# Patient Record
Sex: FEMALE | Race: WHITE | NOT HISPANIC OR LATINO | Employment: OTHER | URBAN - METROPOLITAN AREA
[De-identification: names, ages, dates, MRNs, and addresses within clinical notes are randomized per-mention and may not be internally consistent; named-entity substitution may affect disease eponyms.]

---

## 2023-05-25 ENCOUNTER — APPOINTMENT (OUTPATIENT)
Dept: GENERAL RADIOLOGY | Facility: CLINIC | Age: 78
DRG: 246 | End: 2023-05-25
Attending: EMERGENCY MEDICINE
Payer: COMMERCIAL

## 2023-05-25 ENCOUNTER — HOSPITAL ENCOUNTER (INPATIENT)
Facility: CLINIC | Age: 78
LOS: 7 days | Discharge: NURSING, REHAB OR LONG-TERM CARE | DRG: 246 | End: 2023-06-01
Attending: EMERGENCY MEDICINE | Admitting: STUDENT IN AN ORGANIZED HEALTH CARE EDUCATION/TRAINING PROGRAM
Payer: COMMERCIAL

## 2023-05-25 DIAGNOSIS — Z95.5 STATUS POST INSERTION OF DRUG ELUTING CORONARY ARTERY STENT: ICD-10-CM

## 2023-05-25 DIAGNOSIS — I48.91 ATRIAL FIBRILLATION, UNSPECIFIED TYPE (H): ICD-10-CM

## 2023-05-25 DIAGNOSIS — I10 PRIMARY HYPERTENSION: ICD-10-CM

## 2023-05-25 DIAGNOSIS — R79.89 ELEVATED TROPONIN: ICD-10-CM

## 2023-05-25 DIAGNOSIS — I50.9 ACUTE CONGESTIVE HEART FAILURE, UNSPECIFIED HEART FAILURE TYPE (H): ICD-10-CM

## 2023-05-25 DIAGNOSIS — D64.9 ANEMIA, UNSPECIFIED TYPE: ICD-10-CM

## 2023-05-25 DIAGNOSIS — M25.561 ACUTE PAIN OF BOTH KNEES: ICD-10-CM

## 2023-05-25 DIAGNOSIS — I50.21 ACUTE SYSTOLIC HEART FAILURE (H): Primary | ICD-10-CM

## 2023-05-25 DIAGNOSIS — M25.562 ACUTE PAIN OF BOTH KNEES: ICD-10-CM

## 2023-05-25 DIAGNOSIS — I50.23 ACUTE ON CHRONIC SYSTOLIC HEART FAILURE (H): ICD-10-CM

## 2023-05-25 DIAGNOSIS — I48.0 PAROXYSMAL ATRIAL FIBRILLATION (H): ICD-10-CM

## 2023-05-25 DIAGNOSIS — R10.13 DYSPEPSIA: ICD-10-CM

## 2023-05-25 LAB
ALBUMIN SERPL BCG-MCNC: 4.1 G/DL (ref 3.5–5.2)
ALP SERPL-CCNC: 83 U/L (ref 35–104)
ALT SERPL W P-5'-P-CCNC: 26 U/L (ref 10–35)
ANION GAP SERPL CALCULATED.3IONS-SCNC: 13 MMOL/L (ref 7–15)
AST SERPL W P-5'-P-CCNC: 33 U/L (ref 10–35)
BASOPHILS # BLD AUTO: 0.1 10E3/UL (ref 0–0.2)
BASOPHILS NFR BLD AUTO: 0 %
BILIRUB DIRECT SERPL-MCNC: 0.35 MG/DL (ref 0–0.3)
BILIRUB SERPL-MCNC: 1 MG/DL
BUN SERPL-MCNC: 21.1 MG/DL (ref 8–23)
CALCIUM SERPL-MCNC: 9.3 MG/DL (ref 8.8–10.2)
CHLORIDE SERPL-SCNC: 95 MMOL/L (ref 98–107)
CREAT SERPL-MCNC: 0.79 MG/DL (ref 0.51–0.95)
DEPRECATED HCO3 PLAS-SCNC: 21 MMOL/L (ref 22–29)
EOSINOPHIL # BLD AUTO: 0.1 10E3/UL (ref 0–0.7)
EOSINOPHIL NFR BLD AUTO: 0 %
ERYTHROCYTE [DISTWIDTH] IN BLOOD BY AUTOMATED COUNT: 16.5 % (ref 10–15)
GFR SERPL CREATININE-BSD FRML MDRD: 76 ML/MIN/1.73M2
GLUCOSE SERPL-MCNC: 138 MG/DL (ref 70–99)
HCT VFR BLD AUTO: 37.6 % (ref 35–47)
HGB BLD-MCNC: 11.6 G/DL (ref 11.7–15.7)
HOLD SPECIMEN: NORMAL
HOLD SPECIMEN: NORMAL
IMM GRANULOCYTES # BLD: 0.1 10E3/UL
IMM GRANULOCYTES NFR BLD: 1 %
LYMPHOCYTES # BLD AUTO: 1.2 10E3/UL (ref 0.8–5.3)
LYMPHOCYTES NFR BLD AUTO: 8 %
MAGNESIUM SERPL-MCNC: 2 MG/DL (ref 1.7–2.3)
MCH RBC QN AUTO: 27 PG (ref 26.5–33)
MCHC RBC AUTO-ENTMCNC: 30.9 G/DL (ref 31.5–36.5)
MCV RBC AUTO: 87 FL (ref 78–100)
MONOCYTES # BLD AUTO: 0.9 10E3/UL (ref 0–1.3)
MONOCYTES NFR BLD AUTO: 6 %
NEUTROPHILS # BLD AUTO: 12.7 10E3/UL (ref 1.6–8.3)
NEUTROPHILS NFR BLD AUTO: 85 %
NRBC # BLD AUTO: 0 10E3/UL
NRBC BLD AUTO-RTO: 0 /100
NT-PROBNP SERPL-MCNC: 5524 PG/ML (ref 0–1800)
PLATELET # BLD AUTO: 352 10E3/UL (ref 150–450)
POTASSIUM SERPL-SCNC: 4.7 MMOL/L (ref 3.4–5.3)
PROT SERPL-MCNC: 6.9 G/DL (ref 6.4–8.3)
RBC # BLD AUTO: 4.3 10E6/UL (ref 3.8–5.2)
SODIUM SERPL-SCNC: 129 MMOL/L (ref 136–145)
T4 FREE SERPL-MCNC: 1.24 NG/DL (ref 0.9–1.7)
TROPONIN T SERPL HS-MCNC: 21 NG/L
TSH SERPL DL<=0.005 MIU/L-ACNC: 7.02 UIU/ML (ref 0.3–4.2)
WBC # BLD AUTO: 15 10E3/UL (ref 4–11)

## 2023-05-25 PROCEDURE — 85018 HEMOGLOBIN: CPT | Performed by: EMERGENCY MEDICINE

## 2023-05-25 PROCEDURE — 84484 ASSAY OF TROPONIN QUANT: CPT | Performed by: EMERGENCY MEDICINE

## 2023-05-25 PROCEDURE — 82248 BILIRUBIN DIRECT: CPT | Performed by: EMERGENCY MEDICINE

## 2023-05-25 PROCEDURE — 250N000013 HC RX MED GY IP 250 OP 250 PS 637: Performed by: PHYSICIAN ASSISTANT

## 2023-05-25 PROCEDURE — 83880 ASSAY OF NATRIURETIC PEPTIDE: CPT | Performed by: EMERGENCY MEDICINE

## 2023-05-25 PROCEDURE — 93005 ELECTROCARDIOGRAM TRACING: CPT

## 2023-05-25 PROCEDURE — 99223 1ST HOSP IP/OBS HIGH 75: CPT | Mod: AI | Performed by: PHYSICIAN ASSISTANT

## 2023-05-25 PROCEDURE — 83735 ASSAY OF MAGNESIUM: CPT | Performed by: PHYSICIAN ASSISTANT

## 2023-05-25 PROCEDURE — 84439 ASSAY OF FREE THYROXINE: CPT | Performed by: PHYSICIAN ASSISTANT

## 2023-05-25 PROCEDURE — 99285 EMERGENCY DEPT VISIT HI MDM: CPT | Mod: 25

## 2023-05-25 PROCEDURE — 71046 X-RAY EXAM CHEST 2 VIEWS: CPT

## 2023-05-25 PROCEDURE — 84443 ASSAY THYROID STIM HORMONE: CPT | Performed by: PHYSICIAN ASSISTANT

## 2023-05-25 PROCEDURE — 250N000011 HC RX IP 250 OP 636: Performed by: EMERGENCY MEDICINE

## 2023-05-25 PROCEDURE — 120N000001 HC R&B MED SURG/OB

## 2023-05-25 PROCEDURE — 250N000013 HC RX MED GY IP 250 OP 250 PS 637: Performed by: EMERGENCY MEDICINE

## 2023-05-25 PROCEDURE — 36415 COLL VENOUS BLD VENIPUNCTURE: CPT | Performed by: EMERGENCY MEDICINE

## 2023-05-25 RX ORDER — ACETAMINOPHEN 325 MG/1
650 TABLET ORAL EVERY 6 HOURS PRN
Status: DISCONTINUED | OUTPATIENT
Start: 2023-05-25 | End: 2023-05-30

## 2023-05-25 RX ORDER — FUROSEMIDE 10 MG/ML
40 INJECTION INTRAMUSCULAR; INTRAVENOUS EVERY 12 HOURS
Status: DISCONTINUED | OUTPATIENT
Start: 2023-05-26 | End: 2023-05-27

## 2023-05-25 RX ORDER — LOSARTAN POTASSIUM 25 MG/1
25 TABLET ORAL 2 TIMES DAILY
Status: DISCONTINUED | OUTPATIENT
Start: 2023-05-25 | End: 2023-05-30

## 2023-05-25 RX ORDER — AMOXICILLIN 250 MG
1 CAPSULE ORAL 2 TIMES DAILY PRN
Status: DISCONTINUED | OUTPATIENT
Start: 2023-05-25 | End: 2023-06-01 | Stop reason: HOSPADM

## 2023-05-25 RX ORDER — METOPROLOL TARTRATE 25 MG/1
25 TABLET, FILM COATED ORAL 2 TIMES DAILY
Status: DISCONTINUED | OUTPATIENT
Start: 2023-05-26 | End: 2023-05-28

## 2023-05-25 RX ORDER — ACETAMINOPHEN 650 MG/1
650 SUPPOSITORY RECTAL EVERY 6 HOURS PRN
Status: DISCONTINUED | OUTPATIENT
Start: 2023-05-25 | End: 2023-06-01 | Stop reason: HOSPADM

## 2023-05-25 RX ORDER — LEVOTHYROXINE SODIUM 112 UG/1
112 TABLET ORAL
COMMUNITY
Start: 2022-09-06

## 2023-05-25 RX ORDER — LEVOTHYROXINE SODIUM 112 UG/1
112 TABLET ORAL
Status: DISCONTINUED | OUTPATIENT
Start: 2023-05-26 | End: 2023-06-01 | Stop reason: HOSPADM

## 2023-05-25 RX ORDER — POLYETHYLENE GLYCOL 3350 17 G/17G
17 POWDER, FOR SOLUTION ORAL DAILY PRN
Status: DISCONTINUED | OUTPATIENT
Start: 2023-05-25 | End: 2023-06-01 | Stop reason: HOSPADM

## 2023-05-25 RX ORDER — METOPROLOL TARTRATE 1 MG/ML
5 INJECTION, SOLUTION INTRAVENOUS
Status: ACTIVE | OUTPATIENT
Start: 2023-05-25 | End: 2023-05-25

## 2023-05-25 RX ORDER — FUROSEMIDE 10 MG/ML
40 INJECTION INTRAMUSCULAR; INTRAVENOUS ONCE
Status: COMPLETED | OUTPATIENT
Start: 2023-05-25 | End: 2023-05-25

## 2023-05-25 RX ORDER — ENOXAPARIN SODIUM 150 MG/ML
1 INJECTION SUBCUTANEOUS EVERY 12 HOURS
Status: DISCONTINUED | OUTPATIENT
Start: 2023-05-25 | End: 2023-05-26

## 2023-05-25 RX ORDER — LIDOCAINE 40 MG/G
CREAM TOPICAL
Status: DISCONTINUED | OUTPATIENT
Start: 2023-05-25 | End: 2023-06-01 | Stop reason: HOSPADM

## 2023-05-25 RX ORDER — ENOXAPARIN SODIUM 150 MG/ML
0.75 INJECTION SUBCUTANEOUS ONCE
Status: DISCONTINUED | OUTPATIENT
Start: 2023-05-25 | End: 2023-05-25

## 2023-05-25 RX ORDER — AMOXICILLIN 250 MG
2 CAPSULE ORAL 2 TIMES DAILY PRN
Status: DISCONTINUED | OUTPATIENT
Start: 2023-05-25 | End: 2023-06-01 | Stop reason: HOSPADM

## 2023-05-25 RX ORDER — ONDANSETRON 2 MG/ML
4 INJECTION INTRAMUSCULAR; INTRAVENOUS EVERY 6 HOURS PRN
Status: DISCONTINUED | OUTPATIENT
Start: 2023-05-25 | End: 2023-06-01 | Stop reason: HOSPADM

## 2023-05-25 RX ORDER — METOPROLOL TARTRATE 25 MG/1
25 TABLET, FILM COATED ORAL ONCE
Status: COMPLETED | OUTPATIENT
Start: 2023-05-25 | End: 2023-05-25

## 2023-05-25 RX ORDER — ONDANSETRON 4 MG/1
4 TABLET, ORALLY DISINTEGRATING ORAL EVERY 6 HOURS PRN
Status: DISCONTINUED | OUTPATIENT
Start: 2023-05-25 | End: 2023-06-01 | Stop reason: HOSPADM

## 2023-05-25 RX ORDER — LOSARTAN POTASSIUM 25 MG/1
25 TABLET ORAL 2 TIMES DAILY
Status: ON HOLD | COMMUNITY
Start: 2022-09-06 | End: 2023-06-01

## 2023-05-25 RX ADMIN — FUROSEMIDE 40 MG: 10 INJECTION, SOLUTION INTRAVENOUS at 19:15

## 2023-05-25 RX ADMIN — LOSARTAN POTASSIUM 25 MG: 25 TABLET, FILM COATED ORAL at 23:51

## 2023-05-25 RX ADMIN — METOPROLOL TARTRATE 25 MG: 25 TABLET, FILM COATED ORAL at 19:16

## 2023-05-25 ASSESSMENT — ACTIVITIES OF DAILY LIVING (ADL)
WALKING_OR_CLIMBING_STAIRS_DIFFICULTY: YES
DIFFICULTY_EATING/SWALLOWING: NO
DRESS: 0-->INDEPENDENT
FALL_HISTORY_WITHIN_LAST_SIX_MONTHS: NO
VISION_MANAGEMENT: GLASSES
ADLS_ACUITY_SCORE: 35
HEARING_DIFFICULTY_OR_DEAF: NO
DRESS: 0-->INDEPENDENT
TOILETING_ISSUES: NO
TRANSFERRING: 0-->INDEPENDENT
DRESSING/BATHING: DRESSING DIFFICULTY, DEPENDENT
BATHING: 0-->INDEPENDENT
WALKING_OR_CLIMBING_STAIRS: STAIR CLIMBING DIFFICULTY, REQUIRES EQUIPMENT
ADLS_ACUITY_SCORE: 29
DIFFICULTY_COMMUNICATING: NO
ADLS_ACUITY_SCORE: 35
WEAR_GLASSES_OR_BLIND: YES
CHANGE_IN_FUNCTIONAL_STATUS_SINCE_ONSET_OF_CURRENT_ILLNESS/INJURY: NO
TRANSFERRING: 0-->INDEPENDENT
DOING_ERRANDS_INDEPENDENTLY_DIFFICULTY: YES
CONCENTRATING,_REMEMBERING_OR_MAKING_DECISIONS_DIFFICULTY: NO
DRESSING/BATHING_DIFFICULTY: YES
ADLS_ACUITY_SCORE: 31
EQUIPMENT_CURRENTLY_USED_AT_HOME: WALKER, STANDARD

## 2023-05-25 NOTE — Clinical Note
The first balloon was inserted into the circumflex.Max pressure = 6 ray. Total duration = 15 seconds.

## 2023-05-25 NOTE — Clinical Note
The first balloon was inserted into the left anterior descending and left anterior descending diagonal.Max pressure = 8 ray. Total duration = 15 seconds.

## 2023-05-25 NOTE — ED PROVIDER NOTES
History     Chief Complaint:  Fall and Fatigue     HPI   Fanny Myers is a 78 year old female with history of hypertension and hypothyroidism who presents via EMS for evaluation of fatigue and fall. The patient reports that she was in her chair today when she slid out of her chair onto the ground. She did not hit her head and does not have pain anywhere, did not sustain any injuries, but due to diffuse weakness she was unable to get up. She lives in an independent living facility and she could not reach her phone so she was on the ground for a few hours. She reports over the last few months she has had increased shortness of breath, dry cough and feeling overall weaker. She has also noticed some bilateral leg swelling over the same period of time. She is not having more difficulty breathing when laying flat. She does not smoke or use alcohol. She denies any history of CHF or Afib.     Independent Historian:    EMS, who reports that the patient's blood sugar was checked during transport and was normal.    Review of External Notes:  I personally performed electronic chart review, I see that she has a history of appendectomy.  In 2022 she was admitted for E. coli bacteremia at [facility].    Medications:    levothyroxine   Losartan     Past Medical History:    Chronic pain of knees  Hypertension   Hypothyroidism     Past Surgical History:    Appendectomy   ENT surgery     T&A    Physical Exam     Patient Vitals for the past 24 hrs:   BP Temp Temp src Pulse Resp SpO2 Weight   23 (!) 145/111 98  F (36.7  C) Oral 96 18 99 % --   23 -- -- -- -- -- -- (!) 155.5 kg (342 lb 13 oz)   23 1900 (!) 159/101 -- -- 108 -- 96 % --   23 1816 (!) 145/102 -- -- 104 -- 95 % --   23 1755 (!) 156/117 -- -- 101 -- -- (!) 155.5 kg (342 lb 13 oz)   23 1740 (!) 166/109 -- -- 101 -- 99 % --   23 1725 (!) 151/139 -- -- 106 -- -- --   23 1710 (!) 150/134 --  -- 111 -- -- --   05/25/23 1655 (!) 163/144 -- -- 106 -- 97 % --   05/25/23 1643 (!) 135/102 -- -- 101 -- 95 % --   05/25/23 1613 (!) 153/110 -- -- 106 -- 95 % --   05/25/23 1556 (!) 168/123 -- -- 107 -- 97 % --   05/25/23 1553 (!) 168/119 97.8  F (36.6  C) -- 110 20 100 % --      Physical Exam  General: Woman sitting upright in room 12  HENT: mucous membranes moist, thyroid nonpalpable  CV: rate as above, irregular rhythm, significant symmetric lower extremity edema, no JVD, palpable symmetric radial pulses, no murmur audible  Resp: normal effort, speaks in full phrases, no stridor, no cough observed  GI: abdomen soft and nontender  MSK: no bony tenderness   Skin: appropriately warm and dry  Neuro: alert, clear speech, oriented   Psych: cooperative, pleasant    Emergency Department Course   ECG  ECG taken at 1557, ECG read at 1601  Atrial fibrillation with RVR  Nonspecific T wave abnormality    Rate 105 bpm. ND interval * ms. QRS duration 74 ms. QT/QTc 338/446 ms. P-R-T axes * 18 -30.    Imaging:  XR Chest 2 Views   Final Result   IMPRESSION: Cardiomegaly with pulmonary vascular congestion. Probable small pleural effusions. Atelectasis right lower lobe. Lungs otherwise clear.      Echocardiogram Complete    (Results Pending)     Report per radiology    Laboratory:  Labs Ordered and Resulted from Time of ED Arrival to Time of ED Departure   BASIC METABOLIC PANEL - Abnormal       Result Value    Sodium 129 (*)     Potassium 4.7      Chloride 95 (*)     Carbon Dioxide (CO2) 21 (*)     Anion Gap 13      Urea Nitrogen 21.1      Creatinine 0.79      Calcium 9.3      Glucose 138 (*)     GFR Estimate 76     CBC WITH PLATELETS AND DIFFERENTIAL - Abnormal    WBC Count 15.0 (*)     RBC Count 4.30      Hemoglobin 11.6 (*)     Hematocrit 37.6      MCV 87      MCH 27.0      MCHC 30.9 (*)     RDW 16.5 (*)     Platelet Count 352      % Neutrophils 85      % Lymphocytes 8      % Monocytes 6      % Eosinophils 0      % Basophils 0       % Immature Granulocytes 1      NRBCs per 100 WBC 0      Absolute Neutrophils 12.7 (*)     Absolute Lymphocytes 1.2      Absolute Monocytes 0.9      Absolute Eosinophils 0.1      Absolute Basophils 0.1      Absolute Immature Granulocytes 0.1      Absolute NRBCs 0.0     HEPATIC FUNCTION PANEL - Abnormal    Protein Total 6.9      Albumin 4.1      Bilirubin Total 1.0      Alkaline Phosphatase 83      AST 33      ALT 26      Bilirubin Direct 0.35 (*)    TROPONIN T, HIGH SENSITIVITY - Abnormal    Troponin T, High Sensitivity 21 (*)    NT PROBNP INPATIENT - Abnormal    N terminal Pro BNP Inpatient 5,524 (*)    ROUTINE UA WITH MICROSCOPIC      Emergency Department Course & Assessments:       Interventions:  Medications   levothyroxine (SYNTHROID/LEVOTHROID) tablet 112 mcg (has no administration in time range)   losartan (COZAAR) tablet 25 mg (has no administration in time range)   lidocaine 1 % 0.1-1 mL (has no administration in time range)   lidocaine (LMX4) cream (has no administration in time range)   sodium chloride (PF) 0.9% PF flush 3 mL (has no administration in time range)   sodium chloride (PF) 0.9% PF flush 3 mL (has no administration in time range)   melatonin tablet 1 mg (has no administration in time range)   No anticoagulants IF patient has had acute trauma/surgery or recent intracranial, GI or urinary tract bleeding.  (has no administration in time range)   Patient is already receiving anticoagulation with heparin, enoxaparin (LOVENOX), warfarin (COUMADIN)  or other anticoagulant medication (has no administration in time range)   acetaminophen (TYLENOL) tablet 650 mg (has no administration in time range)     Or   acetaminophen (TYLENOL) Suppository 650 mg (has no administration in time range)   senna-docusate (SENOKOT-S/PERICOLACE) 8.6-50 MG per tablet 1 tablet (has no administration in time range)     Or   senna-docusate (SENOKOT-S/PERICOLACE) 8.6-50 MG per tablet 2 tablet (has no administration in time  range)   polyethylene glycol (MIRALAX) Packet 17 g (has no administration in time range)   ondansetron (ZOFRAN ODT) ODT tab 4 mg (has no administration in time range)     Or   ondansetron (ZOFRAN) injection 4 mg (has no administration in time range)   metoprolol (LOPRESSOR) injection 5 mg (has no administration in time range)   metoprolol tartrate (LOPRESSOR) tablet 25 mg (has no administration in time range)   furosemide (LASIX) injection 40 mg (has no administration in time range)   Continuing ACE inhibitor/ARB/ARNI from home medication list OR ACE inhibitor/ARB/ARNI order already placed during this visit (has no administration in time range)   Continuing beta blocker from home medication list OR beta blocker order already placed during this visit (has no administration in time range)   enoxaparin ANTICOAGULANT (LOVENOX) injection 150 mg (has no administration in time range)   furosemide (LASIX) injection 40 mg (40 mg Intravenous $Given 5/25/23 1915)   metoprolol tartrate (LOPRESSOR) tablet 25 mg (25 mg Oral $Given 5/25/23 1916)      Assessments:  1606 I evaluated the patient and obtained history.     1815 I rechecked the patient and updated on findings and plan of care.     Independent Interpretation (X-rays, CTs, rhythm strip):  I personally reviewed the patient's chest XR images, she had an enlarged heart and some vascular congestion    Consultations/Discussion of Management or Tests:  1833 I spoke with Eloisa Bruno for Dr. Peralta of the Hospitalist service, regarding patient's presentation, findings, and plan of care.     Social Determinants of Health affecting care:  Healthcare Access/Compliance, Transportation and Social Connections/Isolation     Disposition:  The patient was admitted to the hospital under the care of Dr. Peralta.     Impression & Plan    Medical Decision Making:  I think her overall weakness as well as leg swelling and dyspnea can be explained by atrial fibrillation with a slightly  tachycardic response, based on symptoms this has presumably been ongoing for multiple weeks, so she was not a candidate for active efforts to cardiovert her with sedation and electricity.  Her troponin is slightly elevated though I think this is most likely rate mediated rather than representing an acute coronary syndrome.  I do not think that immediate Cath Lab activation or cardiology consultation is indicated though I did initiate rate controlling medication here in the ED as well as anticoagulation for stroke prophylactic purposes after discussion with the accepting hospitalist.  Given her age, comorbidities including obesity, history of hypertension, and fairly debilitating weakness such that she was not able to even get up from the ground, I think she requires hospitalization for close monitoring and further multidisciplinary care.  She has evidence of CHF as well supported by her history, exam findings of leg swelling, chest x-ray findings of volume overload and elevated BNP.  Diuresis also initiated here in the ED and she will require admission to a monitored bed to continue to aggressively treat the symptoms and findings.  She was notified of these findings and plan of care and agrees with them, accepted to the hospitalist service.    Diagnosis:    ICD-10-CM    1. Acute congestive heart failure, unspecified heart failure type (H)  I50.9       2. Atrial fibrillation, unspecified type (H)  I48.91       3. Elevated troponin  R77.8       4. Anemia, unspecified type  D64.9          Scribe Disclosure:  I, Noman Smith, am serving as a scribe at 6:13 PM on 5/25/2023 to document services personally performed by Christophe Dupont MD based on my observations and the provider's statements to me.  5/25/2023   Christophe Dupont MD Reitsema, Jeffrey Alan, MD  05/25/23 2861

## 2023-05-25 NOTE — ED TRIAGE NOTES
Pt arrived via ems from independent living facility where pt was on recliner, trying to get up and slid on floor. Pt was left for a couple hours on floor, as she could not get herself up off floor. Pt reported increased swelling in lower extremities (+3 edema), increased weakness in the last month. Pt also c/o SOB/ dizziness. Ems ekg showed new onset afib. [facility] 153 VSS A/ox4     Triage Assessment     Row Name 05/25/23 1554       Triage Assessment (Adult)    Airway WDL WDL       Respiratory WDL    Respiratory WDL WDL       Cardiac WDL    Cardiac WDL X  new onset afib

## 2023-05-25 NOTE — Clinical Note
Sat probe on right index finger, when hand turned palm side up Sat's diminish.  Sat probe put on ear for zoll machine.

## 2023-05-25 NOTE — Clinical Note
The first balloon was inserted into the left anterior descending and left anterior descending diagonal.Max pressure = 14 ray. Total duration = 15 seconds.     Max pressure = 14 ray. Total duration = 20 seconds.    Balloon reinflated a second time: Max pressure = 14 ray. Total duration = 20 seconds.

## 2023-05-25 NOTE — Clinical Note
The first balloon was inserted into the circumflex and marginal circumflex.Max pressure = 6 ray. Total duration = 20 seconds.     Max pressure = 6 ray. Total duration = 35 seconds.    Balloon reinflated a second time: Max pressure = 6 ray. Total duration = 35 seconds. Max pressure = 6 ray. Total duration = 20 seconds.

## 2023-05-25 NOTE — Clinical Note
Physician paged and told about issues with right lower arm hard to keep upright without a lot of pain.

## 2023-05-25 NOTE — Clinical Note
Patient ID:  Cayla Ramirez 

## 2023-05-26 ENCOUNTER — APPOINTMENT (OUTPATIENT)
Dept: CARDIOLOGY | Facility: CLINIC | Age: 78
DRG: 246 | End: 2023-05-26
Attending: PHYSICIAN ASSISTANT
Payer: COMMERCIAL

## 2023-05-26 ENCOUNTER — APPOINTMENT (OUTPATIENT)
Dept: OCCUPATIONAL THERAPY | Facility: CLINIC | Age: 78
DRG: 246 | End: 2023-05-26
Attending: PHYSICIAN ASSISTANT
Payer: COMMERCIAL

## 2023-05-26 LAB
ANION GAP SERPL CALCULATED.3IONS-SCNC: 9 MMOL/L (ref 7–15)
ATRIAL RATE - MUSE: NORMAL BPM
BUN SERPL-MCNC: 22.4 MG/DL (ref 8–23)
CALCIUM SERPL-MCNC: 9.2 MG/DL (ref 8.8–10.2)
CHLORIDE SERPL-SCNC: 97 MMOL/L (ref 98–107)
CHOLEST SERPL-MCNC: 115 MG/DL
CREAT SERPL-MCNC: 0.9 MG/DL (ref 0.51–0.95)
DEPRECATED HCO3 PLAS-SCNC: 24 MMOL/L (ref 22–29)
DIASTOLIC BLOOD PRESSURE - MUSE: NORMAL MMHG
ERYTHROCYTE [DISTWIDTH] IN BLOOD BY AUTOMATED COUNT: 16.4 % (ref 10–15)
GFR SERPL CREATININE-BSD FRML MDRD: 65 ML/MIN/1.73M2
GLUCOSE SERPL-MCNC: 104 MG/DL (ref 70–99)
HCT VFR BLD AUTO: 35.8 % (ref 35–47)
HDLC SERPL-MCNC: 61 MG/DL
HGB BLD-MCNC: 10.9 G/DL (ref 11.7–15.7)
INTERPRETATION ECG - MUSE: NORMAL
LDLC SERPL CALC-MCNC: 38 MG/DL
LVEF ECHO: NORMAL
MCH RBC QN AUTO: 27 PG (ref 26.5–33)
MCHC RBC AUTO-ENTMCNC: 30.4 G/DL (ref 31.5–36.5)
MCV RBC AUTO: 89 FL (ref 78–100)
NONHDLC SERPL-MCNC: 54 MG/DL
P AXIS - MUSE: NORMAL DEGREES
PLATELET # BLD AUTO: 335 10E3/UL (ref 150–450)
POTASSIUM SERPL-SCNC: 4.6 MMOL/L (ref 3.4–5.3)
PR INTERVAL - MUSE: NORMAL MS
QRS DURATION - MUSE: 74 MS
QT - MUSE: 338 MS
QTC - MUSE: 446 MS
R AXIS - MUSE: 18 DEGREES
RBC # BLD AUTO: 4.04 10E6/UL (ref 3.8–5.2)
SODIUM SERPL-SCNC: 130 MMOL/L (ref 136–145)
SYSTOLIC BLOOD PRESSURE - MUSE: NORMAL MMHG
T AXIS - MUSE: -30 DEGREES
TRIGL SERPL-MCNC: 82 MG/DL
VENTRICULAR RATE- MUSE: 105 BPM
WBC # BLD AUTO: 13.5 10E3/UL (ref 4–11)

## 2023-05-26 PROCEDURE — 99233 SBSQ HOSP IP/OBS HIGH 50: CPT | Performed by: INTERNAL MEDICINE

## 2023-05-26 PROCEDURE — 80048 BASIC METABOLIC PNL TOTAL CA: CPT | Performed by: PHYSICIAN ASSISTANT

## 2023-05-26 PROCEDURE — 255N000002 HC RX 255 OP 636: Performed by: STUDENT IN AN ORGANIZED HEALTH CARE EDUCATION/TRAINING PROGRAM

## 2023-05-26 PROCEDURE — 80061 LIPID PANEL: CPT | Performed by: PHYSICIAN ASSISTANT

## 2023-05-26 PROCEDURE — 97535 SELF CARE MNGMENT TRAINING: CPT | Mod: GO

## 2023-05-26 PROCEDURE — 120N000001 HC R&B MED SURG/OB

## 2023-05-26 PROCEDURE — 250N000013 HC RX MED GY IP 250 OP 250 PS 637: Performed by: PHYSICIAN ASSISTANT

## 2023-05-26 PROCEDURE — 93306 TTE W/DOPPLER COMPLETE: CPT | Mod: 26 | Performed by: INTERNAL MEDICINE

## 2023-05-26 PROCEDURE — 36415 COLL VENOUS BLD VENIPUNCTURE: CPT | Performed by: PHYSICIAN ASSISTANT

## 2023-05-26 PROCEDURE — 85027 COMPLETE CBC AUTOMATED: CPT | Performed by: PHYSICIAN ASSISTANT

## 2023-05-26 PROCEDURE — 999N000208 ECHOCARDIOGRAM COMPLETE

## 2023-05-26 PROCEDURE — 250N000013 HC RX MED GY IP 250 OP 250 PS 637: Performed by: INTERNAL MEDICINE

## 2023-05-26 PROCEDURE — 97165 OT EVAL LOW COMPLEX 30 MIN: CPT | Mod: GO

## 2023-05-26 PROCEDURE — 250N000011 HC RX IP 250 OP 636: Performed by: PHYSICIAN ASSISTANT

## 2023-05-26 RX ADMIN — ENOXAPARIN SODIUM 150 MG: 150 INJECTION SUBCUTANEOUS at 10:32

## 2023-05-26 RX ADMIN — HUMAN ALBUMIN MICROSPHERES AND PERFLUTREN 4 ML: 10; .22 INJECTION, SOLUTION INTRAVENOUS at 08:48

## 2023-05-26 RX ADMIN — METOPROLOL TARTRATE 25 MG: 25 TABLET, FILM COATED ORAL at 10:31

## 2023-05-26 RX ADMIN — APIXABAN 5 MG: 5 TABLET, FILM COATED ORAL at 20:50

## 2023-05-26 RX ADMIN — FUROSEMIDE 40 MG: 10 INJECTION, SOLUTION INTRAMUSCULAR; INTRAVENOUS at 06:16

## 2023-05-26 RX ADMIN — FUROSEMIDE 40 MG: 10 INJECTION, SOLUTION INTRAMUSCULAR; INTRAVENOUS at 18:45

## 2023-05-26 RX ADMIN — LEVOTHYROXINE SODIUM 112 MCG: 0.11 TABLET ORAL at 06:27

## 2023-05-26 RX ADMIN — LOSARTAN POTASSIUM 25 MG: 25 TABLET, FILM COATED ORAL at 20:46

## 2023-05-26 RX ADMIN — METOPROLOL TARTRATE 25 MG: 25 TABLET, FILM COATED ORAL at 20:46

## 2023-05-26 ASSESSMENT — ACTIVITIES OF DAILY LIVING (ADL)
ADLS_ACUITY_SCORE: 41
ADLS_ACUITY_SCORE: 37
ADLS_ACUITY_SCORE: 41
ADLS_ACUITY_SCORE: 31
ADLS_ACUITY_SCORE: 37
DEPENDENT_IADLS:: INDEPENDENT
ADLS_ACUITY_SCORE: 31
ADLS_ACUITY_SCORE: 41
ADLS_ACUITY_SCORE: 41

## 2023-05-26 NOTE — PROGRESS NOTES
PT: Orders received. Chart reviewed and discussed with care team.  PT not indicated due to OT recommending pt discharge to TCU as pt is significantly below baseline for mobility.  OT is following pt and working on mobility while inpatient.  Defer PT to next level of care.  Defer discharge recommendations to OT.  Will complete orders.

## 2023-05-26 NOTE — CONSULTS
[facility]    Received CORE Clinic Consult from NOE Bruno.    **?Cardiology consult pending?? Per Dr Cardoza's 5/26 note.    Reviewed Fanny's chart and note they are admitted admitted on 5/25/2023 with sob and a fib with RVR. New HFrEF  Echocardiogram on 5/26/23 shows LVEF 25-30%.  This is their first admission(s) in the past year for concerns of heart failure.    Given above information, Fanny meets criteria for CORE Clinic as patients EF is =/<40%.     Met with Fanny at bedside to discuss CORE Clinic consult request.  Patient/family confirm they plan to enroll in CORE Clinic.    Patient's primary insurance:  [employer] and Medicare    Pt reports the following HF symptoms prior to admission:  Increased swelling from toes to upper thighs into abdomen. Increased shortness of breath, weakness and fatigue    Living situation:  Ind living at [facility] set up:  Self    Scale available at home:  No, but willing to get one. Discussed importance of daily weights    Barriers to HF follow-up:  None    Remote Monitoring: Consider HRS enrollment at upcoming CORE office visit    Medication review: Patient is not on both Entresto and a SGLT2 Inhibitor (Jardiance, Farxiga, Invokana), Pharmacy Liaison Consult placed for coverage review.     CM/HF education topics we reviewed:  1. Low sodium  2. Proper hydration  3. Daily weights  4. Symptoms of HF to be reported to CORE Team.  5. Medications    Education materials provided:    1. Low sodium food and drink handout  2. Low sodium food product examples  3. Already prepared low salt meal delivery services  4. HF stoplight tool  5. Guide to HF booklet      **APPOINTMENTS PENDING on if cardiology will be consulted this admission - If NOT consulted, Fanny will need to establish care with a MD then can enroll into CORE Clinic. IF Cardiology consulted, She can enroll into CORE- Holds placed for 6/6 (Dr De La Torre at 9:30 am or  Maura Post  -Andree 6/56 for CORE)    Instructed Fanny to call [facility] CORE Team at [phone] with questions/concerns prior to this visit.  Specifically instructed them to call RN with weight gain greater than 2 lbs overnight, and/or 5 lbs in a week, and/or worsening SOB/edema/chest pain.     Future Appointments   Date Time Provider Department Center   5/27/2023  9:00 AM RH OT 1 WAITLIST RHOOT [place] [facility]       Fanny voices understanding and denies further questions or concerns at this time.      Please call with any further questions.    Belkys Cao, VINN PHN RN  CORE Clinic Care Coordinator  [facility] [place]  Phone - [phone]   Fax - [phone]

## 2023-05-26 NOTE — PLAN OF CARE
"Goal Outcome Evaluation:      Plan of Care Reviewed With: patient    Overall Patient Progress: improvingOverall Patient Progress: improving    /89 (BP Location: Right arm)   Pulse 96   Temp 98.2  F (36.8  C) (Oral)   Resp 18   Wt (!) 155.5 kg (342 lb 13 oz)   SpO2 95%       Pt AO4, very pleasant lady. LS clear Bilaterally. Pt on 3L NC. Redness underneath both right and left breast, Abdomen folds and perineal area. RN used the intra absorbent cloths btwn the folds.  Pt has Edema +3 to both feet. Pt denies CP or SOB . Will continue to monitor POC.        Problem: Plan of Care - These are the overarching goals to be used throughout the patient stay.    Goal: Plan of Care Review  Description: The Plan of Care Review/Shift note should be completed every shift.  The Outcome Evaluation is a brief statement about your assessment that the patient is improving, declining, or no change.  This information will be displayed automatically on your shift note.  Outcome: Progressing  Flowsheets (Taken 5/26/2023 0338)  Plan of Care Reviewed With: patient  Overall Patient Progress: improving  Goal: Patient-Specific Goal (Individualized)  Description: You can add care plan individualizations to a care plan. Examples of Individualization might be:  \"Parent requests to be called daily at 9am for status\", \"I have a hard time hearing out of my right ear\", or \"Do not touch me to wake me up as it startles me\".  Outcome: Progressing  Goal: Absence of Hospital-Acquired Illness or Injury  Outcome: Progressing  Intervention: Identify and Manage Fall Risk  Recent Flowsheet Documentation  Taken 5/26/2023 0300 by Nancy Oden, RN  Safety Promotion/Fall Prevention:    safety round/check completed    room near nurse's station    nonskid shoes/slippers when out of bed    mobility aid in reach    lighting adjusted    activity supervised  Taken 5/25/2023 2100 by Nancy Oden, RN  Safety Promotion/Fall Prevention:    activity supervised    " check orthostatic blood pressure    increase visualization of patient    lighting adjusted    mobility aid in reach    nonskid shoes/slippers when out of bed    room door open    safety round/check completed    supervised activity  Taken 5/25/2023 2056 by Nancy Oden RN  Safety Promotion/Fall Prevention:    activity supervised    check orthostatic blood pressure    increase visualization of patient    lighting adjusted    mobility aid in reach    nonskid shoes/slippers when out of bed    room door open    safety round/check completed    supervised activity  Intervention: Prevent Skin Injury  Recent Flowsheet Documentation  Taken 5/26/2023 0300 by Nancy Oden RN  Body Position: position changed independently  Taken 5/25/2023 2100 by Nancy Oden RN  Body Position: position changed independently  Intervention: Prevent and Manage VTE (Venous Thromboembolism) Risk  Recent Flowsheet Documentation  Taken 5/26/2023 0300 by Nancy Oden RN  VTE Prevention/Management: SCDs (sequential compression devices) off  Taken 5/25/2023 2100 by Nancy Oden RN  VTE Prevention/Management: SCDs (sequential compression devices) off  Taken 5/25/2023 2052 by Nancy Oden RN  VTE Prevention/Management: SCDs (sequential compression devices) off  Goal: Optimal Comfort and Wellbeing  Outcome: Progressing  Intervention: Monitor Pain and Promote Comfort  Recent Flowsheet Documentation  Taken 5/26/2023 0300 by Nancy Oden RN  Pain Management Interventions: medication (see MAR)  Taken 5/25/2023 2100 by Nancy Oden RN  Pain Management Interventions: medication (see MAR)  Goal: Readiness for Transition of Care  Outcome: Progressing  Intervention: Mutually Develop Transition Plan  Recent Flowsheet Documentation  Taken 5/25/2023 2058 by Nancy Oden RN  Equipment Currently Used at Home: walker, standard

## 2023-05-26 NOTE — H&P
[facility]    History and Physical - Hospitalist Service       Date of Admission:  5/25/2023    Assessment & Plan      Fanny Myers is a 78 year old female with PMHx significant for HTN, hypothyroidism and obesity, who was admitted on 5/25/2023 with sob and a fib with RVR. She notes sx started after a URI this spring that she suspects was RSV.    1. A fib with RVR  Acute CHF due to a fib  LE edema  Notes mild SOB with exertion for past couple months, started after a URI that she suspects was RSV in March or April. Denies chest pain or palpitations, SOB improves with deep breathing. LE edema comes and goes but new since this spring. Denies PND or orthopnea. Denies hx of afib, CHF or CAD. She is obese and inactive.   In the ED, mildly tachycardic, HRs 110s, VS otherwise stable. Na low at 129, BNP elevated at 5524 and trop mildly elevated at 21. WBC elevated at 15.0, Hgb 11.6. EKG shows a fib with RVR and CXR shows cardiomegaly with pulmonary vascular congestion and small pleural effusion. Lungs sounds faint, no crackles. 2+ LE pitting edema.   - admit to inpatient  - suspect she has been in a fib for several months leading to heart failure  - denies chest pain, low suspicion for ACS.  - monitor on tele, pulse oximetry   - obtain echocardiogram  - start metoprolol 25 mg BID with IV metoprolol 5 mg PRN  - Lovenox subcutaneous for now for stroke prevention, Pharmacy liaison consult for DOAC pricing   - diuresis with IV lasix 40 mg BID. Likely need lasix on discharge as well  - strict I&Os, daily weights  - add on TSH  - Last lipid panel 9/2022; tot chol 211, HDL 63, . may benefit from statin but will defer to PCP   - consider cardiology consult, referral to [facility]    2. Hypothyroidism  Resume home levothyroxine.   Add on TSH  3. HTN  Resume home losartan with parameters  4. Obesity   Complicates cares  5. Hyponatremia  Na 129, suspect due to volume overload. Diurese per above,  recheck in AM       Diet: Low Saturated Fat Na <2400 mg    DVT Prophylaxis: Enoxaparin (Lovenox) SQ  Ervin Catheter: Not present  Lines: None     Cardiac Monitoring: None  Code Status:   Full Code, ok with short trial of resuscitation. Would not want prolonged effort    Clinically Significant Risk Factors Present on Admission         # Hyponatremia: Lowest Na = 129 mmol/L in last 2 days, will monitor as appropriate          # Hypertension: Home medication list includes antihypertensive(s)               Disposition Plan      Expected Discharge Date: 05/27/2023                The patient's care was discussed with the Attending Physician, Dr. Peralta, Patient and ED provider, Dr. uDpont.    Eloisa Bruno PA-C  Hospitalist Service  [facility]  Securely message with [employer] (more info)  Text page via [facility] Paging/Directory     ______________________________________________________________________    Chief Complaint   SOB, weakness    History is obtained from the patient    History of Present Illness   Fanny Myers is a 78 year old female with PMHx significant for HTN, hypothyroidism and obesity who presents to the ED with weakness and shortness of breath.  The patient states she was getting out of her chair and slid to the ground and was too weak to get up.  She denies actually falling.  She notes having a viral upper respiratory illness this past spring which she suspects was RSV and has noted shortness of breath with exertion and lower extremity edema since that time.  She denies chest pain or palpitations.  She may have had low-grade fevers with her illness but nothing since.  She denies abdominal pain, nausea, vomiting, diarrhea or dysuria.  She notes that she is fairly sedentary at baseline.  She denies orthopnea or PND.  She does move around and get short of breath, she stops and takes several big deep breaths which seems to resolve her shortness of breath.  She denies history of  atrial fibrillation, CHF, or CAD.  She notes some family history of heart disease but does not remember specifics.  In the ED, heart rates are mildly elevated in the 110s.  Vital signs otherwise stable.  CMP is remarkable for a sodium of 129, otherwise unremarkable.  BNP is elevated at 5524, troponin slightly elevated at 21.  CBC significant for WBC of 15.0, hemoglobin 11.6, otherwise unremarkable.  EKG shows atrial fibrillation with RVR and nonspecific T wave abnormality.  Chest x-ray shows cardiomegaly with pulmonary vascular congestion and probable small pleural effusions, no obvious infiltrates.  She was given 40 mg IV Lasix, 25 mg p.o. metoprolol and therapeutic subcutaneous Lovenox.  She will be admitted to inpatient for further management of new onset atrial fibrillation and congestive heart failure.      Past Medical History    HTN  Hypothyroidism     Past Surgical History   Past Surgical History:   Procedure Laterality Date     APPENDECTOMY       ENT SURGERY       GYN SURGERY         Prior to Admission Medications   Prior to Admission Medications   Prescriptions Last Dose Informant Patient Reported? Taking?   levothyroxine (SYNTHROID/LEVOTHROID) 112 MCG tablet 5/25/2023 at am  Yes Yes   Sig: Take 112 mcg by mouth every morning (before breakfast)   losartan (COZAAR) 25 MG tablet 5/25/2023 at am  Yes Yes   Sig: Take 25 mg by mouth 2 times daily      Facility-Administered Medications: None           Physical Exam   Vital Signs: Temp: 97.8  F (36.6  C)   BP: (!) 159/101 Pulse: 108   Resp: 20 SpO2: 96 % O2 Device: None (Room air)    Weight: 0 lbs 0 oz    GENERAL:  Comfortable.  PSYCH: pleasant, oriented, No acute distress.  HEENT:  Atraumatic, normocephalic. Normal conjunctiva, normal hearing, and oropharynx is normal.  NECK:  Supple, no neck vein distention  HEART:  Irregularly irregular rhythm.  Normal S1, S2 with no murmur, no pericardial rub.  LUNGS:  Lungs sounds are distant but clear to auscultation,  normal Respiratory effort. No wheezing, rales or rhonchi.  GI:  Soft, normal bowel sounds. Non-tender, non distended.   EXTREMITIES:  2+ bilateral pitting edema, +2 pulses bilateral and equal.  SKIN:  Dry to touch, No rash, wound or ulcerations.  NEUROLOGIC:  CN 2-12 intact, BL 5/5 symmetric upper and lower extremity strength, sensation is intact with no focal deficits.      Medical Decision Making       75+ MINUTES SPENT BY ME on the date of service doing chart review, history, exam, documentation & further activities per the note.      Data     I have personally reviewed the following data over the past 24 hrs:    15.0 (H)  \   11.6 (L)   / 352     129 (L) 95 (L) 21.1 /  138 (H)   4.7 21 (L) 0.79 \       ALT: 26 AST: 33 AP: 83 TBILI: 1.0   ALB: 4.1 TOT PROTEIN: 6.9 LIPASE: N/A       Trop: 21 (H) BNP: 5,524 (H)       Imaging results reviewed over the past 24 hrs:   Recent Results (from the past 24 hour(s))   XR Chest 2 Views    Narrative    EXAM: XR CHEST 2 VIEWS  LOCATION: [facility]  DATE/TIME: 5/25/2023 4:42 PM CDT    INDICATION: several months SOB, bilateral leg edema, new dx AFib, suspect CHF; no fever  COMPARISON: 04/16/2022      Impression    IMPRESSION: Cardiomegaly with pulmonary vascular congestion. Probable small pleural effusions. Atelectasis right lower lobe. Lungs otherwise clear.

## 2023-05-26 NOTE — CONSULTS
Patient has Medicare Advantage through [facility].    Eliquis/Xarelto  --Upon receipt of RX, Discharge Pharmacy can provide 1 mo free.  --Subsequent fills will be $35/mo ($70 for 3 mo at [facility])  --lf/when total drug costs exceed $4,660, price will increase to a 25% coinsurance, equivalent to $140/mo.    Maria Luisa Eastman  Pharmacy Technician/Liaison, Discharge Pharmacy   [phone] (voice or text)  [email or web address]

## 2023-05-26 NOTE — PROGRESS NOTES
05/26/23 1200   Appointment Info   Signing Clinician's Name / Credentials (OT) Nanette Camacho, OTR/L   Living Environment   People in Home alone   Current Living Arrangements apartment   Home Accessibility no concerns   Transportation Anticipated car, drives self   Living Environment Comments tubshower with tubshower bench and grab bars; comfort height toilet and vanity next to   Self-Care   Equipment Currently Used at Home grab bar, tub/shower;walker, standard   Fall history within last six months yes   Number of times patient has fallen within last six months 1   Activity/Exercise/Self-Care Comment At baseline pt is indep with ADLs/IADLs   General Information   Onset of Illness/Injury or Date of Surgery 05/25/23   Referring Physician Eloisa Bruno PA-C   Patient/Family Therapy Goal Statement (OT) get stronger   Additional Occupational Profile Info/Pertinent History of Current Problem per chart: Fanny Myers is a 78 year old female with PMHx significant for HTN, hypothyroidism and obesity, who was admitted on 5/25/2023 with sob and a fib with RVR. She notes sx started after a URI this spring that she suspects was RSV.   Cognitive Status Examination   Cognitive Status Comments pt appears to have lack of insight; would like to go home but require Ax3 to roll side to side in bed   Pain Assessment   Patient Currently in Pain Yes, see Vital Sign flowsheet   Bed Mobility   Bed Mobility rolling left;rolling right   Rolling Left [place] (Bed Mobility) maximum assist (25% patient effort)  (x3)   Rolling Right [place] (Bed Mobility) maximum assist (25% patient effort);other (see comments)  (x 3)   Transfers   Transfer Comments recommending alyce lift at this time   Activities of Daily Living   BADL Assessment/Intervention toileting   Toileting   [place] Level (Toileting) dependent (less than 25% patient effort)   Clinical Impression   Criteria for Skilled Therapeutic Interventions Met (OT) Yes,  treatment indicated   OT Diagnosis HF and weakness   OT Problem List-Impairments impacting ADL activity tolerance impaired;balance;cognition;mobility;pain;strength   Assessment of Occupational Performance 5 or more Performance Deficits   Identified Performance Deficits dressing,toileting, showering, functional transfers and IADLs   Planned Therapy Interventions (OT) ADL retraining;IADL retraining;cognition;transfer training;progressive activity/exercise;home program guidelines;strengthening;balance training;bed mobility training   Clinical Decision Making Complexity (OT) low complexity   Risk & Benefits of therapy have been explained evaluation/treatment results reviewed;care plan/treatment goals reviewed;risks/benefits reviewed;current/potential barriers reviewed;participants voiced agreement with care plan;participants included;patient   Clinical Impression Comments Pt is significantly below baseline.   OT Total Evaluation Time   OT Eval, Low Complexity Minutes (81647) 10   OT Goals   Therapy Frequency (OT) 5 times/wk   OT Predicted Duration/Target Date for Goal Attainment 06/09/23   OT Goals Hygiene/Grooming;Upper Body Dressing;Lower Body Dressing;Toilet Transfer/Toileting;Cognition;Cardiac Phase 1   OT: Hygiene/Grooming supervision/stand-by assist   OT: Upper Body Dressing Supervision/stand-by assist   OT: Lower Body Dressing Supervision/stand-by assist   OT: Toilet Transfer/Toileting Supervision/stand-by assist   OT: Cognitive Patient/caregiver will verbalize understanding of cognitive assessment results/recommendations as needed for safe discharge planning   OT: Understanding of cardiac education to maximize quality of life, condition management, and health outcomes Patient   OT: Perform aerobic activity with stable cardiovascular response 10 minutes   OT: Functional/aerobic ambulation tolerance with stable cardiovascular response in order to return to home and community environment 200 feet   OT: Navigation  of stairs simulating home set up with stable cardiovascular response in order to return to home and community environment   (N/A)   Interventions   Interventions Quick Adds Self-Care/Home Management   Self-Care/Home Management   Self-Care/Home Mgmt/ADL, Compensatory, Meal Prep Minutes (83326) 17   Treatment Detail/Skilled Intervention Pt is dependent x3 with toileting today; pt incontient in bed and requires A x 3 to roll side to side to clean pt and change bedding underneath. Nursing brought in recliner chair however pt declines transferring into recliner.   OT Discharge Planning   OT Plan initiate UE exercises; rolling in bed; trunk exercises in chair; HF education   OT Discharge Recommendation (DC Rec) Transitional Care Facility   OT Rationale for DC Rec Pt is significantly below baseline and would benefit from skilled OT during IP stay and at TCU to maximize indep due to significant weakness.   OT Brief overview of current status max A x 3 rolling in bed; dependent with toileting   Total Session Time   Timed Code Treatment Minutes 17   Total Session Time (sum of timed and untimed services) 27

## 2023-05-26 NOTE — PROGRESS NOTES
[facility]  Hospitalist Progress Note  Jeremy Cardoza MD 05/26/2023    Reason for Stay (Diagnosis): CHF         Assessment and Plan:      Summary of Stay: Fanny Myers is a 78 year old female with PMHx significant for HTN, hypothyroidism and obesity, who was admitted on 5/25/2023 with sob and a fib with RVR. She notes sx started after a URI this spring that she suspects was RSV.  She presented for concerns about generalized weakness, SOB, and LE edema    Plans today:  - continue lasix 40 mg IV q12h  - monitor ins/outs and daily weight and adjust diuretic accordingly  - daily BMP while diuresing  - PT/OT consult  - wean oxygen as able  - stop Lovenox  - start Eliquis     1. A fib with RVR  - start DOAC for AC  - rate currently controlled on low dose metoprolol    2.  Acute systolic heart failure exacerbation  - new dx of systolic HF this admit  - TTE shows EF near 25% with global LV dysftn  - ? Etiology.  Consider rate related due to afib; possible viral CM related to recent URI; consider ischemia  - continue Lasix 40 mg IV for diuresis  - on Losartan  - on metoprolol  - anticipate cardiology consult this admit.  ? NM test vs cath to r/o ischemic cause of new CM     2. Hypothyroidism  - Resumed home levothyroxine.   - TSH slightly elevated at 7; T4 normal    3. HTN  - Resumed home losartan with parameters    4. Obesity   - Complicates cares    5. Hyponatremia  - suspect due to volume overload           Diet: Low Saturated Fat Na <2400 mg    DVT Prophylaxis: Enoxaparin (Lovenox) SQ  Ervin Catheter: Not present  Lines: None     Cardiac Monitoring: None  Code Status:   Full Code, ok with short trial of resuscitation. Would not want prolonged effort  DISPO:  Needs several days of IV diuresis given significant hypervolemia.          Interval History (Subjective):      Patient's primary concern is generalized weakness.  She also endorses some shortness of breath and leg swelling.  She has noticed  "significant urine output since admission to the hospital.  She remains on IV Lasix                  Physical Exam:      Last Vital Signs:  /55 (BP Location: Left arm, Patient Position: Semi-Navarro's, Cuff Size: Adult Large)   Pulse 92   Temp 98.2  F (36.8  C) (Oral)   Resp 18   Ht 1.676 m (5' 6\")   Wt (!) 152.5 kg (336 lb 4.8 oz)   SpO2 99%   BMI 54.28 kg/m        Intake/Output Summary (Last 24 hours) at 5/26/2023 1500  Last data filed at 5/26/2023 0623  Gross per 24 hour   Intake --   Output 850 ml   Net -850 ml       Constitutional: Awake, alert, cooperative, no apparent distress   Respiratory: Clear to auscultation bilaterally, no crackles or wheezing   Cardiovascular: Regular rate and rhythm, normal S1 and S2, and no murmur noted   Abdomen: Normal bowel sounds, soft, non-distended, non-tender   Skin: No rashes, no cyanosis, dry to touch   Neuro: Alert and oriented x3, no weakness, numbness, memory loss   Extremities: Pitting edema into B thighs, normal range of motion   Other(s):        All other systems: Negative          Medications:      All current medications were reviewed with changes reflected in problem list.         Data:      All new lab and imaging data was reviewed.   Labs:  Recent Labs   Lab 05/26/23  0553 05/25/23  1610   * 129*   POTASSIUM 4.6 4.7   CHLORIDE 97* 95*   CO2 24 21*   ANIONGAP 9 13   * 138*   BUN 22.4 21.1   CR 0.90 0.79   GFRESTIMATED 65 76   DANIE 9.2 9.3     Recent Labs   Lab 05/26/23  0553   WBC 13.5*   HGB 10.9*   HCT 35.8   MCV 89         Imaging:   Recent Results (from the past 24 hour(s))   XR Chest 2 Views    Narrative    EXAM: XR CHEST 2 VIEWS  LOCATION: [facility]  DATE/TIME: 5/25/2023 4:42 PM CDT    INDICATION: several months SOB, bilateral leg edema, new dx AFib, suspect CHF; no fever  COMPARISON: 04/16/2022      Impression    IMPRESSION: Cardiomegaly with pulmonary vascular congestion. Probable small pleural " effusions. Atelectasis right lower lobe. Lungs otherwise clear.   Echocardiogram Complete   Result Value    LVEF  25-30%    [facility]    [id number]  [id number]  [id number]  [id number]^[email or web address]^MERCEDEZ^ADAMARIS     [facility]  Echocardiography Laboratory  [address] [place] [address]     Name: WERNER VALENTIN  MRN: [id number]  : 1945  Study Date: 2023 08:19 AM  Age: 78 yrs  Gender: Female  Patient Location: [facility]  Reason For Study: Atrial Fibrillation  Ordering Physician: MERCEDEZ HOUGH  Referring Physician: Clayton Smith  Performed By: Ju Manrique     BSA: 2.5 m2  Height: 65 in  Weight: 342 lb  HR: 96  BP: 145/111 mmHg  ______________________________________________________________________________  Procedure  Complete Portable Echo Adult. Optison (NDC #[id number]) given intravenously.  ______________________________________________________________________________  Interpretation Summary     The visual ejection fraction is 25-30%.  The right ventricle is mildly dilated.  Mildly decreased right ventricular systolic function  There is mild biatrial enlargement.  There is mild (1+) mitral regurgitation.  There is mild (1+) tricuspid regurgitation.  Mild (35-45mmHg) pulmonary hypertension is present.  The rhythm was atrial fibrillation.  The study was technically difficult. Contrast was used without apparent  complications.  ______________________________________________________________________________  Left Ventricle  The left ventricle is normal in size. There is normal left ventricular wall  thickness. The visual ejection fraction is 25-30%. Diastolic function not  assessed due to atrial fibrillation. Regional wall motion abnormalities cannot  be excluded due to limited visualization. There is mod-severe global  hypokinesia of the left ventricle.     Right Ventricle  The right ventricle is mildly dilated. Mildly decreased right ventricular  systolic function.      Atria  There is mild biatrial enlargement.     Mitral Valve  The mitral valve leaflets are mildly thickened. There is mild (1+) mitral  regurgitation.     Tricuspid Valve  The tricuspid valve is not well visualized, but is grossly normal. There is  mild (1+) tricuspid regurgitation. Mild (35-45mmHg) pulmonary hypertension is  present.     Aortic Valve  The aortic valve is not well visualized.     Pulmonic Valve  The pulmonic valve is not well visualized.     Vessels  The aortic root is normal size.     Pericardium  There is no pericardial effusion.     Rhythm  The rhythm was atrial fibrillation.  ______________________________________________________________________________  MMode/2D Measurements & Calculations  Ao root diam: 2.7 cm     LA Volume (BP): 72.5 ml  LA Volume Index (BP): 29.2 ml/m2  RV Base: 4.1 cm  TAPSE: 1.4 cm     Doppler Measurements & Calculations  TR max jose a: 249.3 cm/sec  TR max P.3 mmHg  RV S Jose A: 8.4 cm/sec     ______________________________________________________________________________  Report approved by: Srinivas Valera 2023 01:30 PM

## 2023-05-26 NOTE — CONSULTS
Care Management Initial Consult    General Information  Assessment completed with: Fanny Garza  Type of CM/SW Visit: Initial Assessment    Primary Care Provider verified and updated as needed: Yes   Readmission within the last 30 days: no previous admission in last 30 days      Reason for Consult: discharge planning, care coordination/care conference  Advance Care Planning:            Communication Assessment  Patient's communication style: spoken language (English or Bilingual)    Hearing Difficulty or Deaf: no   Wear Glasses or Blind: yes    Cognitive  Cognitive/Neuro/Behavioral: WDL        Orientation: oriented x 4             Living Environment:   People in home: alone     Current living Arrangements: apartment      Able to return to prior arrangements: other (see comments) (PT recs TCU- patijessica declined)       Family/Social Support:  Care provided by: self  Provides care for: no one  Marital Status:   Children          Description of Support System: Involved, Supportive    Support Assessment: Adequate social supports, Adequate family and caregiver support    Current Resources:   Patient receiving home care services: No     Community Resources: None  Equipment currently used at home: grab bar, tub/shower, walker, rolling             Does the patient's insurance plan have a 3 day qualifying hospital stay waiver?  Yes   Will the waiver be used for post-acute placement? No    Lifestyle & Psychosocial Needs:  Social Determinants of Health     Tobacco Use: Not on file   Alcohol Use: Not on file   Financial Resource Strain: Not on file   Food Insecurity: Not on file   Transportation Needs: Not on file   Physical Activity: Not on file   Stress: Not on file   Social Connections: Not on file   Intimate Partner Violence: Not on file   Depression: Not on file   Housing Stability: Not on file       Functional Status:  Prior to admission patient needed assistance:   Dependent ADLs:: Independent  Dependent  IADLs:: Independent  Assesssment of Functional Status: Not at  functional baseline, Not at baseline with mobility                 Additional Information:  Patient admitted with sob and A fib with RVR. She was found to be in acute CHF with LE edema as well.  She was treated with a beta blocker, IV lasix and a referral to the CORE clinic. She is being followed by the Hospitalist and the CORE clinic.  Case Management met with patient at the bedside. PT recommended TCU. Patient declines TCU. States her apartment is small. She is accepting to Home Care PT. OT and RN. She had no preference of agency.  Referral sent to [facility]. Prior to admission, she was independent in both ADL's and IADL's.  She ambulates with a two wheeled walker which was her 's. She was not receiving any home Care services or other community services.  Transportation to be possibly provided by her son in law , depending on his schedule and when she is discharged she states.   Pt admitted with sob and A fib with RVR, noted to have unplanned readmission risk of 56%.  Care Coordination team is following and will monitor for home care     Sarai Gaines RN, BSN, CM  Inpatient Care Coordination  [facility]  [phone]

## 2023-05-26 NOTE — CONSULTS
CLINICAL NUTRITION SERVICES - BRIEF NOTE     Received consult for 2 gm Na+ diet education. Attempted to see patient for diet education, but patient was sleeping at time of visit. Left 2 gm Na+ education materials on bedside table. Did note CORE consult, so patient will also receive dietitian from that service. RD will follow up as able.    Keeley Wright RD, LD, [facility]  Pager - 3rd floor/ICU: [phone]  Pager - All other floors: [phone]  Pager - Weekend/holiday: [phone]  Office: [phone]

## 2023-05-26 NOTE — ED NOTES
[facility]  ED Nurse Handoff Report    ED Chief complaint: Fall and Fatigue  . ED Diagnosis:   Final diagnoses:   Acute congestive heart failure, unspecified heart failure type (H)   Atrial fibrillation, unspecified type (H)   Elevated troponin   Anemia, unspecified type       Allergies:   Allergies   Allergen Reactions     Codeine Sulfate      Penicillins        Code Status: not discussed    Activity level - Baseline/Home:  independent.  Activity Level - Current:   assist of 1.   Lift room needed: No.   Bariatric: No   Needed: No   Isolation: No.   Infection: Not Applicable.     Respiratory status: Nasal cannula, 2l below 90 when talking     Vital Signs (within 30 minutes):   Vitals:    05/25/23 1740 05/25/23 1755 05/25/23 1816 05/25/23 1900   BP: (!) 166/109 (!) 156/117 (!) 145/102 (!) 159/101   Pulse: 101 101 104 108   Resp:       Temp:       SpO2: 99%  95% 96%       Cardiac Rhythm:  ,      Pain level:    Patient confused: No.   Patient Falls Risk: nonskid shoes/slippers when out of bed.   Elimination Status:  due to void      Patient Report - Initial Complaint: fatigue weakness.   Focused Assessment: cardiac     Abnormal Results:   Labs Ordered and Resulted from Time of ED Arrival to Time of ED Departure   BASIC METABOLIC PANEL - Abnormal       Result Value    Sodium 129 (*)     Potassium 4.7      Chloride 95 (*)     Carbon Dioxide (CO2) 21 (*)     Anion Gap 13      Urea Nitrogen 21.1      Creatinine 0.79      Calcium 9.3      Glucose 138 (*)     GFR Estimate 76     CBC WITH PLATELETS AND DIFFERENTIAL - Abnormal    WBC Count 15.0 (*)     RBC Count 4.30      Hemoglobin 11.6 (*)     Hematocrit 37.6      MCV 87      MCH 27.0      MCHC 30.9 (*)     RDW 16.5 (*)     Platelet Count 352      % Neutrophils 85      % Lymphocytes 8      % Monocytes 6      % Eosinophils 0      % Basophils 0      % Immature Granulocytes 1      NRBCs per 100 WBC 0      Absolute Neutrophils 12.7 (*)      Absolute Lymphocytes 1.2      Absolute Monocytes 0.9      Absolute Eosinophils 0.1      Absolute Basophils 0.1      Absolute Immature Granulocytes 0.1      Absolute NRBCs 0.0     HEPATIC FUNCTION PANEL - Abnormal    Protein Total 6.9      Albumin 4.1      Bilirubin Total 1.0      Alkaline Phosphatase 83      AST 33      ALT 26      Bilirubin Direct 0.35 (*)    TROPONIN T, HIGH SENSITIVITY - Abnormal    Troponin T, High Sensitivity 21 (*)    NT PROBNP INPATIENT - Abnormal    N terminal Pro BNP Inpatient 5,524 (*)    ROUTINE UA WITH MICROSCOPIC        XR Chest 2 Views   Final Result   IMPRESSION: Cardiomegaly with pulmonary vascular congestion. Probable small pleural effusions. Atelectasis right lower lobe. Lungs otherwise clear.          Treatments provided: see chart  Family Comments: says she will update daughter herself   OBS brochure/video discussed/provided to patient:  No  ED Medications:   Medications   furosemide (LASIX) injection 40 mg (has no administration in time range)   metoprolol tartrate (LOPRESSOR) tablet 25 mg (has no administration in time range)   enoxaparin ANTICOAGULANT (LOVENOX) injection 1 mg/kg (Dosing Weight) (has no administration in time range)       Drips infusing:  No  For the majority of the shift this patient was Green.   Interventions performed were per care plan.    Sepsis treatment initiated: No    Cares/treatment/interventions/medications to be completed following ED care: Lovenox not verified     ED Nurse Name: Trent De León RN  7:11 PM     RECEIVING UNIT ED HANDOFF REVIEW    Above ED Nurse Handoff Report was reviewed: Yes  Reviewed by: Nancy Oden RN on May 25, 2023 at 8:02 PM

## 2023-05-26 NOTE — DISCHARGE INSTRUCTIONS
Please call [facility] (Heart Clinic) with any concerns:  Monday-Friday 8:00 AM to 4:30 PM   General Line: [phone] or  [place] Nurse Line: [phone]  After hours:  [phone]    -- Please start weighing self daily and write in wt log chart to bring into your cardiology/CORE clinic follow up appts.   -- Please bring in current medication bottles to cardiology/CORE appts for review.   -- Call RN with weight gain greater than 2 lbs overnight, and/or 5 lbs in a week, and/or worsening shortness of breath/edema.       Your labs are NOT FASTING on 6/6/23 @ 12:45 PM.  OK to eat.

## 2023-05-27 ENCOUNTER — APPOINTMENT (OUTPATIENT)
Dept: PHYSICAL THERAPY | Facility: CLINIC | Age: 78
DRG: 246 | End: 2023-05-27
Attending: INTERNAL MEDICINE
Payer: COMMERCIAL

## 2023-05-27 LAB
ANION GAP SERPL CALCULATED.3IONS-SCNC: 11 MMOL/L (ref 7–15)
BUN SERPL-MCNC: 25.5 MG/DL (ref 8–23)
CALCIUM SERPL-MCNC: 8.9 MG/DL (ref 8.8–10.2)
CHLORIDE SERPL-SCNC: 95 MMOL/L (ref 98–107)
CREAT SERPL-MCNC: 0.9 MG/DL (ref 0.51–0.95)
DEPRECATED HCO3 PLAS-SCNC: 27 MMOL/L (ref 22–29)
GFR SERPL CREATININE-BSD FRML MDRD: 65 ML/MIN/1.73M2
GLUCOSE SERPL-MCNC: 99 MG/DL (ref 70–99)
POTASSIUM SERPL-SCNC: 4 MMOL/L (ref 3.4–5.3)
SODIUM SERPL-SCNC: 133 MMOL/L (ref 136–145)

## 2023-05-27 PROCEDURE — 99223 1ST HOSP IP/OBS HIGH 75: CPT | Performed by: INTERNAL MEDICINE

## 2023-05-27 PROCEDURE — 97161 PT EVAL LOW COMPLEX 20 MIN: CPT | Mod: GP

## 2023-05-27 PROCEDURE — 80048 BASIC METABOLIC PNL TOTAL CA: CPT | Performed by: INTERNAL MEDICINE

## 2023-05-27 PROCEDURE — 250N000013 HC RX MED GY IP 250 OP 250 PS 637: Performed by: INTERNAL MEDICINE

## 2023-05-27 PROCEDURE — 250N000011 HC RX IP 250 OP 636: Performed by: PHYSICIAN ASSISTANT

## 2023-05-27 PROCEDURE — 99233 SBSQ HOSP IP/OBS HIGH 50: CPT | Performed by: INTERNAL MEDICINE

## 2023-05-27 PROCEDURE — 120N000001 HC R&B MED SURG/OB

## 2023-05-27 PROCEDURE — 250N000011 HC RX IP 250 OP 636: Performed by: INTERNAL MEDICINE

## 2023-05-27 PROCEDURE — 36415 COLL VENOUS BLD VENIPUNCTURE: CPT | Performed by: INTERNAL MEDICINE

## 2023-05-27 PROCEDURE — 97530 THERAPEUTIC ACTIVITIES: CPT | Mod: GP

## 2023-05-27 PROCEDURE — 250N000013 HC RX MED GY IP 250 OP 250 PS 637: Performed by: PHYSICIAN ASSISTANT

## 2023-05-27 RX ORDER — FUROSEMIDE 10 MG/ML
80 INJECTION INTRAMUSCULAR; INTRAVENOUS 2 TIMES DAILY
Status: DISCONTINUED | OUTPATIENT
Start: 2023-05-27 | End: 2023-05-29

## 2023-05-27 RX ORDER — POTASSIUM CHLORIDE 1500 MG/1
40 TABLET, EXTENDED RELEASE ORAL DAILY
Status: DISCONTINUED | OUTPATIENT
Start: 2023-05-27 | End: 2023-06-01 | Stop reason: HOSPADM

## 2023-05-27 RX ADMIN — LEVOTHYROXINE SODIUM 112 MCG: 0.11 TABLET ORAL at 06:38

## 2023-05-27 RX ADMIN — APIXABAN 5 MG: 5 TABLET, FILM COATED ORAL at 22:57

## 2023-05-27 RX ADMIN — MENTHOL 1 PATCH: 205.5 PATCH TOPICAL at 14:43

## 2023-05-27 RX ADMIN — LOSARTAN POTASSIUM 25 MG: 25 TABLET, FILM COATED ORAL at 07:58

## 2023-05-27 RX ADMIN — FUROSEMIDE 40 MG: 10 INJECTION, SOLUTION INTRAMUSCULAR; INTRAVENOUS at 06:38

## 2023-05-27 RX ADMIN — LOSARTAN POTASSIUM 25 MG: 25 TABLET, FILM COATED ORAL at 22:57

## 2023-05-27 RX ADMIN — METOPROLOL TARTRATE 25 MG: 25 TABLET, FILM COATED ORAL at 22:57

## 2023-05-27 RX ADMIN — POTASSIUM CHLORIDE 40 MEQ: 1500 TABLET, EXTENDED RELEASE ORAL at 17:15

## 2023-05-27 RX ADMIN — METOPROLOL TARTRATE 25 MG: 25 TABLET, FILM COATED ORAL at 07:58

## 2023-05-27 RX ADMIN — FUROSEMIDE 80 MG: 10 INJECTION, SOLUTION INTRAVENOUS at 17:15

## 2023-05-27 RX ADMIN — APIXABAN 5 MG: 5 TABLET, FILM COATED ORAL at 07:57

## 2023-05-27 ASSESSMENT — ACTIVITIES OF DAILY LIVING (ADL)
ADLS_ACUITY_SCORE: 37

## 2023-05-27 NOTE — PLAN OF CARE
Goal Outcome Evaluation:      Plan of Care Reviewed With: patient    Overall Patient Progress: improvingOverall Patient Progress: improving         VSS, weaned oxygen to 1 L, sats low 90's.  Denies SOB. Lungs diminished.  C/o of discomfort in knee, icy hot patch applied.  Pt has both bladder and bowel incontinence, purewick in place with good output.  Pt up to chair with assist of two and walker.  PT/OT following, cardiology consulted. Redness to abdominal folds, interdry in place.  Edema to lower extremities, +3-4, pitting. Tele: A. Fib with CVR.

## 2023-05-27 NOTE — PROGRESS NOTES
Care Management Follow Up    Length of Stay (days): 2    Expected Discharge Date: 05/28/2023     Concerns to be Addressed: discharge planning, care coordination/care conferences     Patient plan of care discussed at interdisciplinary rounds: Yes    Anticipated Discharge Disposition: Home Care vs TCU      Anticipated Discharge Services: HC vs TCU   Anticipated Discharge DME: None    Patient/Family in Agreement with the Plan: yes    Referrals Placed by CM/SW: Home care & TCU   Private pay costs discussed: WC ride costs were discussed.     Additional Information:  PT recommends TCU.   JOSÉ MIGUEL met with pt and her dtr Sherrell and her SAFIA Chapito. Pt is not realistic about her mobility or lackthereof.   Pt is reluctantly agreeable to TCU. If pt were to go home, Sherrell and Chapito would assist. Sherrell is a former  OT that used to work in TCUs. Sherrell has some physical restrictions as she is in a wheelchair. Chapito works and is not available around the clock.     [facility] [place]   [facility]  [facility]  3. TCUs in [place].    Chapito or Malissa or Negar will provide transportation at discharge.     JOSÉ MIGUEL sent TCU referrals.    RIRI Leach, LGSW  [facility]      [phone]

## 2023-05-27 NOTE — CONSULTS
Inpatient Cardiology Consultation.   [facility]  Date of Admission: 5/25/2023  Date of Consult: 5/27/2023.    REASON FOR CONSULT:  1.  New diagnosis of left ventricular systolic cardiomyopathy.  2.  New diagnosis of atrial fibrillation.    HISTORY OF PRESENT ILLNESS:  Fanny Myers is a 78 year old female with history of morbid obesity (weighs 342 pounds, BMI 53), hypertension and hypothyroidism, who presented via EMS to the ER on 5/25/2023 for evaluation of fatigue and fall.  She was in her chair and slid out of her chair onto the ground, no loss of consciousness, had diffuse weakness and was unable to get up.  She lives in an independent living facility, could not reach her phone, was on the ground for a few hours.    She reports that over the last few months, she has had progressive shortness of breath, generalized weakness, bilateral lower extremity edema, orthopnea, weight gain.  Home meds include losartan 25 mg 2 times daily and levothyroxine.    On presentation, she was markedly hypertensive at 168/120 mmHg, not hypoxemic, and ECG showed atrial fibrillation with rapid ventricular rates of 110 bpm which is a new diagnosis for her.  No acute ischemic changes on ECG.  Chest x-ray showed cardiomegaly with pulmonary edema, no pneumonia, no significant pleural effusions.    Labs pertinent for hyponatremia of 129, normal serum potassium of 4.7, normal renal function with a creatinine of 0.79, estimated GFR 76, mild leukocytosis of 15, chronic mild anemia of 11.6, normal platelets, normal liver enzymes, markedly elevated NT proBNP of 5500, minimally elevated high-sensitivity troponin T of 21 in the context of A-fib RVR.  TSH mildly elevated at 7.0 with normal free T4 (on replacement therapy).    Her echocardiogram is abnormal with biventricular systolic cardiomyopathy.  I personally reviewed images.  Technically difficult images due to body habitus.  Grossly normal atrial size.  With  "contrast opacification, the left ventricle appears mildly dilated with severely reduced systolic function and global hypokinesis, visually estimated LVEF 25-30 %, diastolic function indeterminate due to underlying atrial fibrillation, the right ventricle is normal in size with moderately decreased systolic function, inferior vena cava is dilated, mild mitral and tricuspid valve regurgitation, no significant aortic valve disease based on Doppler data.    She has been appropriately started on metoprolol tartrate 25 Mg twice daily, Eliquis 5 mg twice daily and furosemide IV 40 mg twice daily.  She has had a robust diuresis, weight is down by 15 pounds (weighs 327 pounds today), symptoms have improved, A-fib rate control has improved (heart rates are in the 80s\", BP is 124/87, not tachypneic, sats 100% on 3 L oxygen.    DIAGNOSES:   1.  New diagnosis of acute decompensated heart failure with severely reduced ejection fraction, LVEF 25-30% with global hypokinesis, with pulmonary edema.  2.  New diagnosis of right heart failure with moderately decreased systolic function.  3.  New diagnosis of atrial fibrillation with rapid ventricular rates.  4.  Mild mitral valve regurgitation.  5.  Mild tricuspid valve regurgitation.  6.  Benign essential hypertension.  7.  Morbid obesity, BMI 53.  8.  Hypothyroidism.    ASSESSMENT:  Patient is a 78-year-old lady with a new diagnosis of atrial fibrillation, severe left ventricular systolic cardiomyopathy with global hypokinesis (LVEF 25%), right heart failure with moderately decreased systolic function with 2 months of symptoms of heart failure culminating acutely over the last 2 weeks.  She has several pounds of fluid overload with gross pitting edema up to her abdomen. Very high clinical suspicion of sleep apnea given body habitus.    Plan will be for diuresis with IV loop diuretics and when euvolemic, proceed to coronary angiogram as part of work-up for new heart failure.  For the " atrial fibrillation, anticoagulation, rate control.  Poor candidate for cardioversion as a high risk of recurrence given other comorbidities.  Additionally, since she is asymptomatic, she may have been in atrial fibrillation for a prolonged period of time.    PLAN:  1.  Increase IV furosemide to 80 mg twice daily.  Give her an additional dose of 40 Mg IV now.  2.  Transition from metoprolol to tartrate to metoprolol XL 25 mg daily and uptitrate as needed for goal heart rate of 60-80 bpm.  3.  She is already on losartan 25 Mg twice daily.  Please continue as part of GDMT for heart failure.  If we need blood pressure room for diuresis/rate control, decrease to 25 mg once daily.  4.  Agree with apixaban 5 mg twice daily.  5.  I have ordered 2 g sodium restriction, 2000 mL fluid restriction.  6.  Cardiology will follow.  Thank you for consulting us.  7.  Plan of care discussed with patient's bedside RN, patient's daughter (who is an occupational therapist) and son-in-law were present in the room.  8.  Outpatient sleep clinic evaluation.    HIGH COMPLEXITY MEDICAL DECISION MAKING.    Faizan Alejo MD, MD [facility]  Cardiology        CODE STATUS:  Full Code      DIAGNOSTIC DATA:  I have personally reviewed and independently interpreted labs, ECG tracings, echocardiogram images, chest x-ray images, as documented above.    REVIEW OF SYSTEMS:  A comprehensive 10 point review of systems was completed and the pertinent positives are documented in history of present illness.    FAMILY HISTORY:  No family history on file.    MEDICATIONS:  Prior to Admission Medications   Prescriptions Last Dose Informant Patient Reported? Taking?   levothyroxine (SYNTHROID/LEVOTHROID) 112 MCG tablet 5/25/2023 at am  Yes Yes   Sig: Take 112 mcg by mouth every morning (before breakfast)   losartan (COZAAR) 25 MG tablet 5/25/2023 at am  Yes Yes   Sig: Take 25 mg by mouth 2 times daily      Facility-Administered Medications: None       HOME  MEDICATIONS:  Prior to Admission Medications   Prescriptions Last Dose Informant Patient Reported? Taking?   levothyroxine (SYNTHROID/LEVOTHROID) 112 MCG tablet 5/25/2023 at am  Yes Yes   Sig: Take 112 mcg by mouth every morning (before breakfast)   losartan (COZAAR) 25 MG tablet 5/25/2023 at am  Yes Yes   Sig: Take 25 mg by mouth 2 times daily      Facility-Administered Medications: None       ALLERGIES:  Allergies   Allergen Reactions     Codeine Sulfate      Penicillins        PAST MEDICAL HISTORY:  No past medical history on file.    PAST SURGICAL HISTORY:  Past Surgical History:   Procedure Laterality Date     APPENDECTOMY       ENT SURGERY       GYN SURGERY         SOCIAL HISTORY:   Fanny Myers  reports that she has never smoked. She does not have any smokeless tobacco history on file. She reports that she does not drink alcohol and does not use drugs.      PHYSICAL EXAMINATION:  Temp: 98.1  F (36.7  C) Temp src: Tympanic BP: 126/86 Pulse: 88   Resp: 20 SpO2: 95 % O2 Device: Nasal cannula Oxygen Delivery: 1 LPM  05/22 1500 - 05/27 1459  In: 480 [P.O.:480]  Out: 5350 [Urine:5350]  Net: -4870  Vitals:    05/25/23 1755 05/25/23 1958 05/26/23 0621 05/27/23 0647   Weight: (!) 155.5 kg (342 lb 13 oz) (!) 155.5 kg (342 lb 13 oz) (!) 152.5 kg (336 lb 4.8 oz) 148.6 kg (327 lb 11.2 oz)       Constitutional: On supplemental oxygen.  Has conversational dyspnea.  Cooperative, alert, well developed, well nourished. Elevated BMI.   Eyes: Pupils reactive, no pallor or icterus.  ENT: No cyanosis or pallor.  Moist mucous membranes.  Cardiovascular: Irregularly irregular heart sounds.  Detailed cardiopulmonary examination challenging due to body habitus.  Respiratory: Bibasilar rales.  GI: Large abdominal pannus.  Unable to do a meaningful examination.  Skin: No erythema or ecchymosis. No pertinent skin findings.  Musculoskeletal: Generally reduced strength due to physical deconditioning.  Neuropsychiatric: Oriented to  "time place and person.  Affect normal.  No gross motor deficits.  Extremities: Gross 3+ pitting edema up to abdomen from lower extremities.    Clinically Significant Risk Factors                   # Acute heart failure with reduced ejection fraction: last echo with EF <40% and receiving IV diuretics       # Severe Obesity: Estimated body mass index is 52.89 kg/m  as calculated from the following:    Height as of this encounter: 1.676 m (5' 6\").    Weight as of this encounter: 148.6 kg (327 lb 11.2 oz)., PRESENT ON ADMISSION           Faizan Alejo MD, MD    "

## 2023-05-27 NOTE — PLAN OF CARE
Goal Outcome Evaluation:      Plan of Care Reviewed With: patient    Overall Patient Progress: improvingOverall Patient Progress: improving    Outcome Evaluation: . stable    Vitals VSS  Neuro A&Ox4  Respiratory 100% on 3 LPM NC. PENDLETON  Cardiac/Tele Afib CVR  GI/ Purewick in place  Skin BLE edema   LDAs PIV SL  Labs K+ protocol, recheck in am  Diet 2 gm Na  Activity lift device   Plan TBD. Continue to monitor.

## 2023-05-27 NOTE — PLAN OF CARE
Goal Outcome Evaluation:       Admitting Diagnosis:  CHF  Pertinent History:HTN, obesity.  For vitals and assessment please see flow sheet.   Living Situation: Home  Pain plan: denies pain  Mobility:  lift team assistance, assistance, 3 or more people  Baseline activity: with assistive equipment.  Alarms/Safety:BA.   LDA's:  PIV.   Pertinent test results: K+ 4.6, Mg+ 2.0.   Consults:  OT following.   Abnormals/Pending:none  Other Cares/Comments: A & O, VSS,Tele Afib/CVR, LS clear, O2 96% 2L NC. Pure wick in place, IV lasix given.   Discharge Disposition: TBD.   Discharge Time:  TBD.

## 2023-05-27 NOTE — PROGRESS NOTES
[facility]  Hospitalist Progress Note  Jeremy Cardoza MD 05/27/2023    Reason for Stay (Diagnosis): CHF, weakness         Assessment and Plan:      Summary of Stay: Fanny Myers is a 78 year old female with PMHx significant for HTN, hypothyroidism and obesity, who was admitted on 5/25/2023 with sob and a fib with RVR. She notes sx started after a URI this spring that she suspects was RSV.  She presented for concerns about generalized weakness, SOB, and LE edema    Work up included TTE showing a new dx of CM with EF near 25%.  She is being treated with IV Lasix for diuresis with good results; weight 155 -> 148     Plans today:  - continue lasix 40 mg IV q12h  - monitor ins/outs and daily weight  - daily BMP while diuresing  - wean oxygen as able  - cards consult in the setting of new CM  - pt encouraged to get up OOB, mobilize     1. A fib with RVR  - started DOAC for AC  - rate currently controlled on low dose metoprolol     2.  Acute systolic heart failure exacerbation  - new dx of systolic HF this admit  - TTE shows EF near 25% with global LV dysftn  - ? Etiology.  Consider rate related due to afib; possible viral CM related to recent URI; consider ischemia  - continue Lasix 40 mg IV bid for diuresis  - on Losartan  - on metoprolol  - cardiology consult.       2. Hypothyroidism  - Resumed home levothyroxine.   - TSH slightly elevated at 7; T4 normal     3. HTN  - Resumed home losartan with parameters     4. Obesity   - Complicates cares     5. Hyponatremia  - suspect due to volume overload           Diet: Low Saturated Fat Na <2400 mg    DVT Prophylaxis: Enoxaparin (Lovenox) SQ  Ervin Catheter: Not present  Lines: None     Cardiac Monitoring: None  Code Status:   Full Code, ok with short trial of resuscitation. Would not want prolonged effort  DISPO:  TCU currently recommended.  Needs several more days of IV diuresis given significant hypervolemia prior to discharge.          Interval  "History (Subjective):      No SOB.  Feels tired.  Nursing reports not getting up much/at all.                    Physical Exam:      Last Vital Signs:  /87 (BP Location: Left arm)   Pulse 87   Temp 97.8  F (36.6  C) (Oral)   Resp 18   Ht 1.676 m (5' 6\")   Wt 148.6 kg (327 lb 11.2 oz)   SpO2 100%   BMI 52.89 kg/m        Intake/Output Summary (Last 24 hours) at 5/27/2023 1240  Last data filed at 5/27/2023 0955  Gross per 24 hour   Intake 480 ml   Output 3700 ml   Net -3220 ml       Constitutional: Awake, alert, cooperative, no apparent distress   Respiratory: Clear to auscultation bilaterally, no crackles or wheezing   Cardiovascular: Regular rate and rhythm, normal S1 and S2, and no murmur noted   Abdomen: Normal bowel sounds, soft, non-distended, non-tender   Skin: No rashes, no cyanosis, dry to touch   Neuro: Alert and oriented x3, no weakness, numbness, memory loss   Extremities: pitting edema into thighs (though improved), normal range of motion   Other(s):        All other systems: Negative          Medications:      All current medications were reviewed with changes reflected in problem list.         Data:      All new lab and imaging data was reviewed.   Labs:  Recent Labs   Lab 05/27/23  0659 05/26/23  0553 05/25/23  1610   * 130* 129*   POTASSIUM 4.0 4.6 4.7   CHLORIDE 95* 97* 95*   CO2 27 24 21*   ANIONGAP 11 9 13   GLC 99 104* 138*   BUN 25.5* 22.4 21.1   CR 0.90 0.90 0.79   GFRESTIMATED 65 65 76   DANIE 8.9 9.2 9.3     Recent Labs   Lab 05/26/23  0553   WBC 13.5*   HGB 10.9*   HCT 35.8   MCV 89         Imaging:   No results found for this or any previous visit (from the past 24 hour(s)).   "

## 2023-05-27 NOTE — PROGRESS NOTES
05/27/23 1155   Appointment Info   Signing Clinician's Name / Credentials (PT) Isabela Colon DPT   Living Environment   People in Home alone   Current Living Arrangements apartment   Home Accessibility no concerns   Transportation Anticipated family or friend will provide;agency   Living Environment Comments tubshower with tubshower bench and grab bars; comfort height toilet   Self-Care   Usual Activity Tolerance moderate   Current Activity Tolerance poor   Equipment Currently Used at Home grab bar, tub/shower;walker, rolling   Fall history within last six months yes   Number of times patient has fallen within last six months 1   Activity/Exercise/Self-Care Comment At baseline, patient reports that she is independent with use of 2WW   General Information   Onset of Illness/Injury or Date of Surgery 05/25/23   Referring Physician Jeremy Cardoza MD   Patient/Family Therapy Goals Statement (PT) Patient would like to return to home   Pertinent History of Current Problem (include personal factors and/or comorbidities that impact the POC) Per medical chart: Fanny Myers is a 78 year old female with PMHx significant for HTN, hypothyroidism and obesity, who was admitted on 5/25/2023 with sob and a fib with RVR. She notes sx started after a URI this spring that she suspects was RSV.  She presented for concerns about generalized weakness, SOB, and LE edema.  Work up included TTE showing a new dx of CM with EF near 25%.  She is being treated with IV Lasix for diuresis with good results   Existing Precautions/Restrictions fall   Cognition   Orientation Status (Cognition) oriented to;person;place;situation   Safety Deficit (Cognition) awareness of need for assistance;insight into deficits/self-awareness;judgment   Integumentary/Edema   Integumentary/Edema Comments Patient with +3 bilateral foot/LE edema   Posture    Posture Forward head position;Protracted shoulders   Range of Motion (ROM)   Range of Motion  ROM deficits secondary to swelling   ROM Comment Patient with decreased ankle DF secondary to edema   Strength (Manual Muscle Testing)   Strength (Manual Muscle Testing) Deficits observed during functional mobility   Bed Mobility   Comment, (Bed Mobility) Unable to complete independently.  Required mod A to complete   Transfers   Transfers sit-stand transfer   Sit-Stand Transfer   Sit-Stand Curry (Transfers) contact guard   Assistive Device (Sit-Stand Transfers) walker, front-wheeled   Gait/Stairs (Locomotion)   Comment, (Gait/Stairs) Patient performed pivot transfer only at bedside   Balance   Balance Comments Patient with history of fall, increased risk of falls secondary to decreased ankle DF, decreased strength, poor insight into deficits   Clinical Impression   Criteria for Skilled Therapeutic Intervention Yes, treatment indicated   PT Diagnosis (PT) Impaired functional mobility   Influenced by the following impairments decreased strength, decreased activity tolerance, decreased balance   Functional limitations due to impairments difficulties with transfers, gait, ADLs   Clinical Presentation (PT Evaluation Complexity) Stable/Uncomplicated   Clinical Presentation Rationale clinical judgement   Clinical Decision Making (Complexity) low complexity   Planned Therapy Interventions (PT) balance training;bed mobility training;gait training;patient/family education;strengthening;transfer training;progressive activity/exercise   Anticipated Equipment Needs at Discharge (PT) commode chair;shower chair;wheelchair   Risk & Benefits of therapy have been explained evaluation/treatment results reviewed;care plan/treatment goals reviewed;risks/benefits reviewed;current/potential barriers reviewed;participants voiced agreement with care plan;participants included;patient   PT Total Evaluation Time   PT Eval, Low Complexity Minutes (45388) 10   Physical Therapy Goals   PT Frequency Daily   PT Predicted Duration/Target  Date for Goal Attainment 06/02/23   PT Goals Bed Mobility;Transfers;Gait;Aerobic Activity   PT: Bed Mobility Supervision/stand-by assist;Supine to/from sit   PT: Transfers Supervision/stand-by assist;Sit to/from stand;Bed to/from chair;Assistive device   PT: Gait Supervision/stand-by assist;Assistive device;150 feet   PT: Perform aerobic activity with stable cardiovascular response continuous activity;10 minutes;ambulation   Interventions   Interventions Quick Adds Therapeutic Activity   Therapeutic Activity   Therapeutic Activities: dynamic activities to improve functional performance Minutes (16262) 40   Symptoms Noted During/After Treatment Fatigue   Treatment Detail/Skilled Intervention Patient supine upon arrival, on 3L NC throughout session.  Patient agreeable to mobility in order to demonstrate that she was able to discharge to apartment.  Patient attempted bed mobility, however was unable to independently transfer to sitting even with heavy use of bed rails (patient reports she can hold onto side table next to bed at home).  Patient required mod A to transfer to sitting at EOB.  Patient unable to don socks prior to mobility, required max A.  Facilitated transfer between sitting and standing with 2WW and CGA.  Patient incontinent of bowel and bladder.  In standing, therapist provided CGA for stability and nursing providing max A for malgorzata care.  Patient unable to tolerate standing greater than 2 min, requested return to sitting.  Again transferred to standing with 2WW and CGA.  Nursing attempted to complete malgorzata cares dependently, however patient again became fatigued and returned to sitting.  Patient required mod A at LEs to return to supine in order for nursing to complete malgorzata cares.  Purewick and new brief donned.  Patient again unable to transfer from supine to sitting without mod A.  Again transferred between sitting and standing with 2WW and CGA.  Patient performed pivot transfer to bedside chair with  2WW, CGA and Ax1 for management of lines/tubes.  Patient up in bedside chair at end of session.  Education provided regarding need for increased level of assist at discharge, need for TCU.  Patient declining TCU, reporting that when she was ready to leave the hospital, she would be ready to discharge to home.   PT Discharge Planning   PT Plan Increased amb distance, bed mobility, toilet transfers   PT Discharge Recommendation (DC Rec) Transitional Care Facility   PT Rationale for DC Rec Patient presents below baseline for functional mobility.  Patient currently requires A of 1-2 for mobility.  Patient incontinent, dependent for brief changes and malgorzata cares.  Patient has been unable to demonstrate household mobility.  Recommend discharge to TCU in order to increase strength, activity tolerance and independence with mobility.  If patient declines TCU, would require 24/7 assist with mobility and ADLs, use of wheelchair for mobility greater than 10 feet, use of bedside commode for toileting, Home RN/PT/OT/HHA.   PT Brief overview of current status Patient requires assist for bed mobility, LE dressing, CGA for transfers to standing.   Fatigues quickly   Total Session Time   Timed Code Treatment Minutes 40   Total Session Time (sum of timed and untimed services) 50

## 2023-05-27 NOTE — PLAN OF CARE
"Goal Outcome Evaluation: Cared for from 0700 - 1500      Plan of Care Reviewed With: patient      Pt A&O x 4. VSS. Assist x 2 with a lift with transfer. Assist x 3 with cares. O2 Sat 99% on 3L of oxygen via NC. LS clear. PENDLETON. BS+. BLE edema. Incontinent of B&B. Pure wick in place.Has blanchable redness on abdominal folds. Underneath breast, perineum and coccyx. Denies pain or nausea. PIV SL.  On Lasix 40 mg and Lovenox. On K+ protocol. K+ 4.6. Redraw in the morning    /84   Pulse 96   Temp 98.2  F (36.8  C) (Oral)   Resp 18   Ht 1.676 m (5' 6\")   Wt (!) 152.5 kg (336 lb 4.8 oz)   SpO2 95%   BMI 54.28 kg/m               "

## 2023-05-28 LAB
ANION GAP SERPL CALCULATED.3IONS-SCNC: 9 MMOL/L (ref 7–15)
BUN SERPL-MCNC: 23.6 MG/DL (ref 8–23)
CALCIUM SERPL-MCNC: 8.8 MG/DL (ref 8.8–10.2)
CHLORIDE SERPL-SCNC: 96 MMOL/L (ref 98–107)
CREAT SERPL-MCNC: 0.82 MG/DL (ref 0.51–0.95)
DEPRECATED HCO3 PLAS-SCNC: 30 MMOL/L (ref 22–29)
GFR SERPL CREATININE-BSD FRML MDRD: 73 ML/MIN/1.73M2
GLUCOSE SERPL-MCNC: 108 MG/DL (ref 70–99)
POTASSIUM SERPL-SCNC: 4 MMOL/L (ref 3.4–5.3)
SODIUM SERPL-SCNC: 135 MMOL/L (ref 136–145)

## 2023-05-28 PROCEDURE — 250N000013 HC RX MED GY IP 250 OP 250 PS 637: Performed by: INTERNAL MEDICINE

## 2023-05-28 PROCEDURE — 99233 SBSQ HOSP IP/OBS HIGH 50: CPT | Performed by: INTERNAL MEDICINE

## 2023-05-28 PROCEDURE — 250N000011 HC RX IP 250 OP 636: Performed by: INTERNAL MEDICINE

## 2023-05-28 PROCEDURE — 250N000013 HC RX MED GY IP 250 OP 250 PS 637: Performed by: PHYSICIAN ASSISTANT

## 2023-05-28 PROCEDURE — 82310 ASSAY OF CALCIUM: CPT | Performed by: INTERNAL MEDICINE

## 2023-05-28 PROCEDURE — 36415 COLL VENOUS BLD VENIPUNCTURE: CPT | Performed by: INTERNAL MEDICINE

## 2023-05-28 PROCEDURE — 120N000001 HC R&B MED SURG/OB

## 2023-05-28 PROCEDURE — 99232 SBSQ HOSP IP/OBS MODERATE 35: CPT | Performed by: INTERNAL MEDICINE

## 2023-05-28 RX ORDER — METOPROLOL SUCCINATE 25 MG/1
25 TABLET, EXTENDED RELEASE ORAL 2 TIMES DAILY
Status: DISCONTINUED | OUTPATIENT
Start: 2023-05-28 | End: 2023-06-01 | Stop reason: HOSPADM

## 2023-05-28 RX ADMIN — ACETAMINOPHEN 650 MG: 325 TABLET ORAL at 02:44

## 2023-05-28 RX ADMIN — METOPROLOL TARTRATE 25 MG: 25 TABLET, FILM COATED ORAL at 08:42

## 2023-05-28 RX ADMIN — LEVOTHYROXINE SODIUM 112 MCG: 0.11 TABLET ORAL at 08:42

## 2023-05-28 RX ADMIN — FUROSEMIDE 80 MG: 10 INJECTION, SOLUTION INTRAVENOUS at 08:41

## 2023-05-28 RX ADMIN — ACETAMINOPHEN 650 MG: 325 TABLET ORAL at 22:55

## 2023-05-28 RX ADMIN — FUROSEMIDE 80 MG: 10 INJECTION, SOLUTION INTRAVENOUS at 13:40

## 2023-05-28 RX ADMIN — ACETAMINOPHEN 650 MG: 325 TABLET ORAL at 08:41

## 2023-05-28 RX ADMIN — METOPROLOL SUCCINATE 25 MG: 25 TABLET, EXTENDED RELEASE ORAL at 17:41

## 2023-05-28 RX ADMIN — LOSARTAN POTASSIUM 25 MG: 25 TABLET, FILM COATED ORAL at 08:41

## 2023-05-28 RX ADMIN — POTASSIUM CHLORIDE 40 MEQ: 1500 TABLET, EXTENDED RELEASE ORAL at 08:41

## 2023-05-28 RX ADMIN — APIXABAN 5 MG: 5 TABLET, FILM COATED ORAL at 08:41

## 2023-05-28 RX ADMIN — APIXABAN 5 MG: 5 TABLET, FILM COATED ORAL at 22:55

## 2023-05-28 RX ADMIN — LOSARTAN POTASSIUM 25 MG: 25 TABLET, FILM COATED ORAL at 22:55

## 2023-05-28 ASSESSMENT — ACTIVITIES OF DAILY LIVING (ADL)
ADLS_ACUITY_SCORE: 36
ADLS_ACUITY_SCORE: 36
ADLS_ACUITY_SCORE: 37
ADLS_ACUITY_SCORE: 37
ADLS_ACUITY_SCORE: 38
ADLS_ACUITY_SCORE: 37
ADLS_ACUITY_SCORE: 38
ADLS_ACUITY_SCORE: 37
ADLS_ACUITY_SCORE: 37
ADLS_ACUITY_SCORE: 36
ADLS_ACUITY_SCORE: 38
ADLS_ACUITY_SCORE: 37

## 2023-05-28 NOTE — PLAN OF CARE
Goal Outcome Evaluation:       Assumed cares 1500-1900PM:     A&OX4. Up with assist of 2, GB and walker. VSS, On 1L O2 via NC.   Denies any pain and SOB. Lungs diminished. Tele: Afib w/CVR. Pt. Is on IV lasix 80mg BID. Cardiology following. 2gm na diet with 2000mls FR.  Redness to abdominal folds, interdry in place.  Edema to lower extremities, +3-4, pitting. PT/OT following. Will continue to monitor.

## 2023-05-28 NOTE — PROGRESS NOTES
[facility] [place]  Hospitalist Progress Note  Jermey Cardoza MD 05/28/2023    Reason for Stay (Diagnosis): CHF, afib         Assessment and Plan:      Summary of Stay: Fanny Myers is a 78 year old female with PMHx significant for HTN, hypothyroidism and obesity, who was admitted on 5/25/2023 with sob and a fib with RVR. She notes sx started after a URI this spring that she suspects was RSV.  She presented for concerns about generalized weakness, SOB, and LE edema     Work up included TTE showing a new dx of CM with EF near 25%.  She is being treated with IV Lasix for diuresis with good results; weight 155 -> 148     Plans today:  - continue lasix 80 mg IV q12h  - monitor ins/outs and daily weight  - daily BMP while diuresing  - OOB, mobilize, rehab therapy  - cards planning angiogram after diuresis to further eval her new CM     1. A fib with RVR  - started DOAC for AC  - rate currently controlled on low dose metoprolol     2.  Acute systolic heart failure exacerbation  - new dx of systolic HF this admit  - TTE shows EF near 25% with global LV dysftn  - ? Etiology.  Consider rate related due to afib; possible viral CM related to recent URI; consider ischemia  - continue Lasix 80 mg IV bid for diuresis  - on Losartan  - on metoprolol  - cardiology consult appreciated; angiogram being planned after further diuresis.       2. Hypothyroidism  - Resumed home levothyroxine.   - TSH slightly elevated at 7; T4 normal     3. HTN  - Resumed home losartan with parameters     4. Obesity   - Complicates cares     5. Hyponatremia  - suspect due to volume overload           Diet: Low Saturated Fat Na <2400 mg    DVT Prophylaxis: Enoxaparin (Lovenox) SQ  Ervin Catheter: Not present  Lines: None     Cardiac Monitoring: None  Code Status:   Full Code, ok with short trial of resuscitation. Would not want prolonged effort  DISPO:  TCU currently recommended.  Needs several more days of IV diuresis given significant  "hypervolemia prior to discharge.          Interval History (Subjective):      \"I do not feel bad\".  No shortness of breath.  Diuresing well with Lasix.  Has been weaned off supplemental oxygen.                  Physical Exam:      Last Vital Signs:  /82 (BP Location: Left arm)   Pulse 105   Temp 98.3  F (36.8  C) (Oral)   Resp 20   Ht 1.676 m (5' 6\")   Wt 149.7 kg (330 lb 0.5 oz)   SpO2 (!) 88%   BMI 53.27 kg/m        Intake/Output Summary (Last 24 hours) at 5/28/2023 1136  Last data filed at 5/28/2023 0808  Gross per 24 hour   Intake 240 ml   Output 1950 ml   Net -1710 ml       Constitutional: Awake, alert, cooperative, no apparent distress   Respiratory: Clear to auscultation bilaterally, no crackles or wheezing   Cardiovascular: Regular rate and rhythm, normal S1 and S2, and no murmur noted   Abdomen: Normal bowel sounds, soft, non-distended, non-tender   Skin: No rashes, no cyanosis, dry to touch   Neuro: Alert and oriented x3, no weakness, numbness, memory loss   Extremities: 2+ edema into thighs, normal range of motion   Other(s):        All other systems: Negative          Medications:      All current medications were reviewed with changes reflected in problem list.         Data:      All new lab and imaging data was reviewed.   Labs:  Recent Labs   Lab 05/28/23  0559 05/27/23  0659 05/26/23  0553   * 133* 130*   POTASSIUM 4.0 4.0 4.6   CHLORIDE 96* 95* 97*   CO2 30* 27 24   ANIONGAP 9 11 9   * 99 104*   BUN 23.6* 25.5* 22.4   CR 0.82 0.90 0.90   GFRESTIMATED 73 65 65   DANIE 8.8 8.9 9.2     Recent Labs   Lab 05/26/23  0553   WBC 13.5*   HGB 10.9*   HCT 35.8   MCV 89         Imaging:   No results found for this or any previous visit (from the past 24 hour(s)).   "

## 2023-05-28 NOTE — PLAN OF CARE
Goal Outcome Evaluation:  vss A&OX4. Alarms on. Calls aprop. Up to chair with A2 belt and walker. Incont. Large urine incont (purwick was not working). On iv lasix. Ls dim. sats stable on RA. Denies sob. Tele afib CVR. Icy hot patch to right knee. Tylenol given for knee pain. About 400 intake today

## 2023-05-28 NOTE — PROGRESS NOTES
[facility].  Inpatient Cardiology Progress Note.  Date of Service: 5/28/2023.     INTERVAL HISTORY:  IV furosemide increased yesterday, diuresing well.  Off supplemental oxygen.  Down by 12 pounds.  Atrial fibrillation is rate controlled in the 80s on metoprolol titrate 25 Mg twice daily.    On exam - still has significant bilateral lower extremity edema.  Due to morbid obese body habitus, cardiopulmonary examination challenging.  /82.    Labs - Creatinine 0.8, potassium 4.0, hyponatremia has improved from 129 on admission to 135.    DIAGNOSES:   1.  New diagnosis of acute decompensated heart failure with severely reduced ejection fraction, LVEF 25-30% with global hypokinesis, with pulmonary edema.  Likely tachycardia mediated versus nonischemic cardiomyopathy.  Will need a coronary angiogram when euvolemic.  Continue diuresis and up titration of GDMT.  2.  New diagnosis of right heart failure with moderately decreased systolic function.  Needs diuresis and GDMT.  3.  New diagnosis of atrial fibrillation with rapid ventricular rates.  Rate control being achieved with metoprolol.  Apixaban started on this hospitalization.  4.  Mild mitral valve regurgitation.  5.  Mild tricuspid valve regurgitation.  6.  Benign essential hypertension.  7.  Morbid obesity, BMI 53.  8.  Hypothyroidism.    ASSESSMENT/PLAN:  1.  Continue IV furosemide 80 mg twice daily as she is diuresing well.  2.  Switch from metoprolol to tartrate 25 mg twice daily to metoprolol XL 25 Mg twice daily for both heart failure GDMT and better A-fib rate control.  3.  Continue Eliquis 5 mg twice daily.  4.  Continue with potassium chloride 40 mg daily due to ongoing diuresis and risk of hypokalemia.  5.  Continue established home medication of losartan 25 mg twice daily.  At outpatient follow-up, will switch to Entresto as heart failure GDMT.  6.  Once euvolemic, diagnostic coronary angiogram to be scheduled.   7.  Plan of care  discussed with the patient's bedside RN.  Cardiology will follow.    Total time today: 40 minutes.    Faizan Alejo MD, MD [facility]  Cardiology.        VITAL SIGNS:  Temp: 98.3  F (36.8  C) Temp src: Oral BP: 125/82 Pulse: 105   Resp: 20 SpO2: (!) 88 % O2 Device: None (Room air) Oxygen Delivery: 1 LPM  05/23 0700 - 05/28 0659  In: 600 [P.O.:600]  Out: 7300 [Urine:7300]  Net: -6700  Vitals:    05/25/23 1755 05/25/23 1958 05/26/23 0621 05/27/23 0647   Weight: (!) 155.5 kg (342 lb 13 oz) (!) 155.5 kg (342 lb 13 oz) (!) 152.5 kg (336 lb 4.8 oz) 148.6 kg (327 lb 11.2 oz)    05/28/23 0608   Weight: 149.7 kg (330 lb 0.5 oz)

## 2023-05-29 ENCOUNTER — APPOINTMENT (OUTPATIENT)
Dept: PHYSICAL THERAPY | Facility: CLINIC | Age: 78
DRG: 246 | End: 2023-05-29
Payer: COMMERCIAL

## 2023-05-29 LAB
ANION GAP SERPL CALCULATED.3IONS-SCNC: 10 MMOL/L (ref 7–15)
APTT PPP: 59 SECONDS (ref 22–38)
BUN SERPL-MCNC: 23.4 MG/DL (ref 8–23)
CALCIUM SERPL-MCNC: 9 MG/DL (ref 8.8–10.2)
CHLORIDE SERPL-SCNC: 93 MMOL/L (ref 98–107)
CREAT SERPL-MCNC: 0.86 MG/DL (ref 0.51–0.95)
DEPRECATED HCO3 PLAS-SCNC: 33 MMOL/L (ref 22–29)
ERYTHROCYTE [DISTWIDTH] IN BLOOD BY AUTOMATED COUNT: 16.5 % (ref 10–15)
GFR SERPL CREATININE-BSD FRML MDRD: 69 ML/MIN/1.73M2
GLUCOSE SERPL-MCNC: 105 MG/DL (ref 70–99)
HCT VFR BLD AUTO: 38.9 % (ref 35–47)
HGB BLD-MCNC: 12 G/DL (ref 11.7–15.7)
MCH RBC QN AUTO: 26.6 PG (ref 26.5–33)
MCHC RBC AUTO-ENTMCNC: 30.8 G/DL (ref 31.5–36.5)
MCV RBC AUTO: 86 FL (ref 78–100)
PLATELET # BLD AUTO: 371 10E3/UL (ref 150–450)
POTASSIUM SERPL-SCNC: 3.8 MMOL/L (ref 3.4–5.3)
RBC # BLD AUTO: 4.51 10E6/UL (ref 3.8–5.2)
SODIUM SERPL-SCNC: 136 MMOL/L (ref 136–145)
WBC # BLD AUTO: 14.4 10E3/UL (ref 4–11)

## 2023-05-29 PROCEDURE — 97116 GAIT TRAINING THERAPY: CPT | Mod: GP | Performed by: PHYSICAL THERAPIST

## 2023-05-29 PROCEDURE — 36415 COLL VENOUS BLD VENIPUNCTURE: CPT | Performed by: INTERNAL MEDICINE

## 2023-05-29 PROCEDURE — 99233 SBSQ HOSP IP/OBS HIGH 50: CPT | Performed by: INTERNAL MEDICINE

## 2023-05-29 PROCEDURE — 250N000011 HC RX IP 250 OP 636: Performed by: INTERNAL MEDICINE

## 2023-05-29 PROCEDURE — 250N000013 HC RX MED GY IP 250 OP 250 PS 637: Performed by: INTERNAL MEDICINE

## 2023-05-29 PROCEDURE — 120N000001 HC R&B MED SURG/OB

## 2023-05-29 PROCEDURE — 85027 COMPLETE CBC AUTOMATED: CPT | Performed by: INTERNAL MEDICINE

## 2023-05-29 PROCEDURE — 80048 BASIC METABOLIC PNL TOTAL CA: CPT | Performed by: INTERNAL MEDICINE

## 2023-05-29 PROCEDURE — 97530 THERAPEUTIC ACTIVITIES: CPT | Mod: GP | Performed by: PHYSICAL THERAPIST

## 2023-05-29 PROCEDURE — 250N000013 HC RX MED GY IP 250 OP 250 PS 637: Performed by: PHYSICIAN ASSISTANT

## 2023-05-29 PROCEDURE — 85730 THROMBOPLASTIN TIME PARTIAL: CPT | Performed by: INTERNAL MEDICINE

## 2023-05-29 RX ORDER — HEPARIN SODIUM 10000 [USP'U]/100ML
0-5000 INJECTION, SOLUTION INTRAVENOUS CONTINUOUS
Status: DISCONTINUED | OUTPATIENT
Start: 2023-05-29 | End: 2023-05-30

## 2023-05-29 RX ORDER — CALCIUM CARBONATE 500 MG/1
500 TABLET, CHEWABLE ORAL DAILY PRN
Status: DISCONTINUED | OUTPATIENT
Start: 2023-05-29 | End: 2023-05-31

## 2023-05-29 RX ORDER — HEPARIN SODIUM 10000 [USP'U]/100ML
0-5000 INJECTION, SOLUTION INTRAVENOUS CONTINUOUS
Status: DISCONTINUED | OUTPATIENT
Start: 2023-05-29 | End: 2023-05-29

## 2023-05-29 RX ORDER — FUROSEMIDE 10 MG/ML
80 INJECTION INTRAMUSCULAR; INTRAVENOUS EVERY MORNING
Status: DISCONTINUED | OUTPATIENT
Start: 2023-05-30 | End: 2023-05-31

## 2023-05-29 RX ADMIN — METOPROLOL SUCCINATE 25 MG: 25 TABLET, EXTENDED RELEASE ORAL at 18:00

## 2023-05-29 RX ADMIN — HEPARIN SODIUM AND DEXTROSE 1200 UNITS/HR: 10000; 5 INJECTION INTRAVENOUS at 13:57

## 2023-05-29 RX ADMIN — METOPROLOL SUCCINATE 25 MG: 25 TABLET, EXTENDED RELEASE ORAL at 08:12

## 2023-05-29 RX ADMIN — POTASSIUM CHLORIDE 40 MEQ: 1500 TABLET, EXTENDED RELEASE ORAL at 08:12

## 2023-05-29 RX ADMIN — FUROSEMIDE 80 MG: 10 INJECTION, SOLUTION INTRAVENOUS at 08:09

## 2023-05-29 RX ADMIN — CALCIUM CARBONATE (ANTACID) CHEW TAB 500 MG 500 MG: 500 CHEW TAB at 23:11

## 2023-05-29 RX ADMIN — FUROSEMIDE 80 MG: 10 INJECTION, SOLUTION INTRAVENOUS at 12:21

## 2023-05-29 RX ADMIN — APIXABAN 5 MG: 5 TABLET, FILM COATED ORAL at 08:12

## 2023-05-29 RX ADMIN — ACETAMINOPHEN 650 MG: 325 TABLET ORAL at 13:23

## 2023-05-29 RX ADMIN — LOSARTAN POTASSIUM 25 MG: 25 TABLET, FILM COATED ORAL at 21:00

## 2023-05-29 RX ADMIN — ACETAMINOPHEN 650 MG: 325 TABLET ORAL at 05:00

## 2023-05-29 RX ADMIN — LEVOTHYROXINE SODIUM 112 MCG: 0.11 TABLET ORAL at 06:47

## 2023-05-29 ASSESSMENT — ACTIVITIES OF DAILY LIVING (ADL)
ADLS_ACUITY_SCORE: 38
ADLS_ACUITY_SCORE: 36
ADLS_ACUITY_SCORE: 38
ADLS_ACUITY_SCORE: 36

## 2023-05-29 NOTE — PLAN OF CARE
VSS.  AOX4.  Breathing is unlabored and pt denies SOB.  2000ml fluid restriction observed.  2 gm Na diet with good appetite.  Assist of 1-2 with gait belt and walker.      Pt on lasix; copious amount of incontinent urine that went unmeasured.  Pt also had watery loose stool this shift.  Purewick in place and now collecting urine.    K+ 4.0    Tele: Afib CVR

## 2023-05-29 NOTE — PLAN OF CARE
"Goal Outcome Evaluation:      Plan of Care Reviewed With: patient    Overall Patient Progress: improvingOverall Patient Progress: improving     /88 (BP Location: Left arm)   Pulse 94   Temp 98  F (36.7  C) (Oral)   Resp 18   Ht 1.676 m (5' 6\")   Wt 149.7 kg (330 lb 0.5 oz)   SpO2 96%   BMI 53.27 kg/m      VSS, A&Ox4. Denies SOB & chest pain. Assist x2. PRN tylenol x1 for knee pain. LS clear, 96% on RA. Tele Afib CVR, 2.1 sec pause at 0515. Incontinent of watery diarrhea. Purewick in use, no redness or breakdown. Output 800 through purewick, soaked brief. Bilateral knee pain & edema. Blanchable redness in abdominal folds. Plan: few more days of diuresis, possible TCU    Heart Failure Care Map  GOALS TO BE MET BEFORE DISCHARGE:    1. Decrease congestion and/or edema with diuretic therapy to achieve near optimal volume status.     Dyspnea improved: Yes, satisfactory for discharge.   Edema improved: No, further care required to meet this goal. Please explain +3-+4 edema in LE        Last 24 hour I/O:   Intake/Output Summary (Last 24 hours) at 5/29/2023 0540  Last data filed at 5/29/2023 0524  Gross per 24 hour   Intake 1000 ml   Output 1600 ml   Net -600 ml           Net I/O and Weights since admission:   04/29 0700 - 05/29 0659  In: 1600 [P.O.:1600]  Out: 8900 [Urine:8900]  Net: -7300     Vitals:    05/25/23 1755 05/25/23 1958 05/26/23 0621 05/27/23 0647   Weight: (!) 155.5 kg (342 lb 13 oz) (!) 155.5 kg (342 lb 13 oz) (!) 152.5 kg (336 lb 4.8 oz) 148.6 kg (327 lb 11.2 oz)    05/28/23 0608 05/29/23 0523   Weight: 149.7 kg (330 lb 0.5 oz) 141.5 kg (311 lb 15.2 oz)       2.  O2 sats > 90% on room air, or at prior home O2 therapy level.      Able to wean O2 this shift to keep sats above 90%?: Yes, satisfactory for discharge.   Does patient use Home O2? No          Current oxygenation status:   SpO2: 96 %     O2 Device: None (Room air),      3.  Tolerates ambulation and mobility near baseline.     Ambulation: " No, further care required to meet this goal. Please explain did not ambulate this shift   Times patient ambulated with staff this shift: 0    Please review the Heart Failure Care Map for additional HF goal outcomes.    Tess Lorenzo RN  5/29/2023

## 2023-05-29 NOTE — PROGRESS NOTES
[facility].  Inpatient Cardiology Progress Note.  Date of Service: 10/29/2023.    INTERVAL HISTORY:  Excellent diuresis with 80 mg IV twice daily furosemide.  Off oxygen, respiratory status improved, bilateral lower extremity edema markedly improved, weight is down from 342 pounds on admission to 311 pounds, i.e. 31 pounds.  Hyponatremia has resolved.  Sats 97%.  A-fib rate controlled improved after transitioning to metoprolol XL yesterday.    On exam - rate controlled atrial fibrillation at 72 bpm, slightly hypotensive at 96 4/73 (asymptomatic), sats 97% on room air, weight 311 pounds.  Still has significant bilateral lower extremity edema and orthopnea.    Labs - sodium is 136, serum potassium 3.8, BUN 23, creatinine remains normal at 0.86.    DIAGNOSES:   1.  New diagnosis of acute decompensated heart failure with severely reduced ejection fraction, LVEF 25-30% with global hypokinesis, with pulmonary edema.  Likely tachycardia mediated versus nonischemic cardiomyopathy.  Will need a coronary angiogram when euvolemic.   2.  New diagnosis of right heart failure with moderately decreased systolic function.  Diuresis and GDMT.  3.  New diagnosis of atrial fibrillation with rapid ventricular rates.    Rate controlled on metoprolol XL 25 mg twice daily. Apixaban started on this hospitalization.  4.  Mild mitral valve regurgitation.  5.  Mild tricuspid valve regurgitation.  6.  Benign essential hypertension.  7.  Morbid obesity, BMI 53.  8.  Hypothyroidism.     ASSESSMENT/PLAN:  1.    Weight is down by 31 pounds.  Decrease furosemide from 80 mg twice daily to IV once daily.  Once euvolemic, switch to oral torsemide 20 mg daily.  2.   Continue metoprolol XL 25 mg twice daily.  3.   Hold Eliquis, start IV heparin in anticipation of coronary angiogram in the next 24-48 hours (once she is able to lay flat).  4.  Continue with potassium chloride 40 mg daily due to ongoing diuresis and risk of  hypokalemia.  5.  Continue established home medication of losartan 25 mg twice daily.  At outpatient follow-up, will switch to Entresto as heart failure GDMT.  6.  Once euvolemic, diagnostic coronary angiogram to be scheduled.  I have ordered n.p.o. from midnight.  7.  Plan of care discussed with the patient's bedside RN and hospital pharmacist for anticoagulation artery.  Cardiology will follow.     Total time today: 50 minutes.    Faizan Alejo MD, MD [facility]  Cardiology.        VITAL SIGNS:  Temp: 97.7  F (36.5  C) Temp src: Oral BP: 96/73 Pulse: 72   Resp: 18 SpO2: 97 % O2 Device: None (Room air)    05/24 0700 - 05/29 0659  In: 1600 [P.O.:1600]  Out: 8900 [Urine:8900]  Net: -7300  Vitals:    05/25/23 1755 05/25/23 1958 05/26/23 0621 05/27/23 0647   Weight: (!) 155.5 kg (342 lb 13 oz) (!) 155.5 kg (342 lb 13 oz) (!) 152.5 kg (336 lb 4.8 oz) 148.6 kg (327 lb 11.2 oz)    05/28/23 0608 05/29/23 0523   Weight: 149.7 kg (330 lb 0.5 oz) 141.5 kg (311 lb 15.2 oz)

## 2023-05-29 NOTE — PROGRESS NOTES
[facility]  Hospitalist Progress Note  Jeremy Cardoza MD 05/29/2023    Reason for Stay (Diagnosis): CHF exac         Assessment and Plan:      Summary of Stay: Fanny Myers is a 78 year old female with PMHx significant for HTN, hypothyroidism and obesity, who was admitted on 5/25/2023 with sob and a fib with RVR. She notes sx started after a URI this spring that she suspects was RSV.  She presented for concerns about generalized weakness, SOB, and LE edema     Work up included TTE showing a new dx of CM with EF near 25%.  She is being treated with IV Lasix for diuresis with good results; weight (kg) 155 -> 141      Plans today:  - continue lasix 80 mg IV q12h  - monitor ins/outs and daily weight  - daily BMP while diuresing  - OOB, mobilize, rehab therapy  - cards planning angiogram after diuresis to further eval her new CM  - rehab recommending TCU on discharge     1. A fib with RVR  - started DOAC for AC  - rate currently controlled on low dose metoprolol     2.  Acute systolic heart failure exacerbation with new dx of CM this admit  - new dx of systolic HF this admit  - TTE shows EF near 25% with global LV dysftn  - ? Etiology.  Consider rate related due to afib; possible viral CM related to recent URI; consider ischemia  - continue Lasix 80 mg IV bid for diuresis  - on Losartan  - on metoprolol  - cardiology consult appreciated; angiogram being planned after further diuresis.       2. Hypothyroidism  - Resumed home levothyroxine.   - TSH slightly elevated at 7; T4 normal     3. HTN  - Resumed home losartan with parameters     4. Obesity   - Complicates cares     5. Hyponatremia  - suspect due to volume overload           Diet: Low Saturated Fat Na <2400 mg    DVT Prophylaxis: Enoxaparin (Lovenox) SQ  Ervin Catheter: Not present  Lines: None     Cardiac Monitoring: None  Code Status:   Full Code, ok with short trial of resuscitation. Would not want prolonged effort  DISPO:  TCU  "currently recommended.  Needs further diuresis and completion of cardiology work up prior to discharge        Interval History (Subjective):      No concerns.  No sob.  No fever.  Not requiring supplemental oxygen.  \"I really don't feel bad\".                    Physical Exam:      Last Vital Signs:  BP 96/73 (BP Location: Left arm)   Pulse 72   Temp 97.7  F (36.5  C) (Oral)   Resp 18   Ht 1.676 m (5' 6\")   Wt 141.5 kg (311 lb 15.2 oz)   SpO2 97%   BMI 50.35 kg/m        Intake/Output Summary (Last 24 hours) at 5/29/2023 1134  Last data filed at 5/29/2023 0915  Gross per 24 hour   Intake 880 ml   Output 2700 ml   Net -1820 ml       Constitutional: Awake, alert, cooperative, no apparent distress   Respiratory: Clear to auscultation bilaterally, no crackles or wheezing   Cardiovascular: Regular rate and rhythm, normal S1 and S2, and no murmur noted   Abdomen: Normal bowel sounds, soft, non-distended, non-tender   Skin: No rashes, no cyanosis, dry to touch   Neuro: Alert and oriented x3, no weakness, numbness, memory loss   Extremities: Pitting edema BLE into thighs (improving), normal range of motion   Other(s):        All other systems: Negative          Medications:      All current medications were reviewed with changes reflected in problem list.         Data:      All new lab and imaging data was reviewed.   Labs:  Recent Labs   Lab 05/29/23  0626 05/28/23  0559 05/27/23  0659    135* 133*   POTASSIUM 3.8 4.0 4.0   CHLORIDE 93* 96* 95*   CO2 33* 30* 27   ANIONGAP 10 9 11   * 108* 99   BUN 23.4* 23.6* 25.5*   CR 0.86 0.82 0.90   GFRESTIMATED 69 73 65   DANIE 9.0 8.8 8.9     Recent Labs   Lab 05/26/23  0553 05/25/23  1610   WBC 13.5* 15.0*   HGB 10.9* 11.6*   HCT 35.8 37.6   MCV 89 87    352      Imaging:   No results found for this or any previous visit (from the past 24 hour(s)).   "

## 2023-05-30 ENCOUNTER — APPOINTMENT (OUTPATIENT)
Dept: PHYSICAL THERAPY | Facility: CLINIC | Age: 78
DRG: 246 | End: 2023-05-30
Payer: COMMERCIAL

## 2023-05-30 LAB
ACT BLD: 139 SECONDS (ref 74–150)
ACT BLD: 148 SECONDS (ref 74–150)
ACT BLD: 148 SECONDS (ref 74–150)
ACT BLD: 152 SECONDS (ref 74–150)
ACT BLD: 203 SECONDS (ref 74–150)
ACT BLD: 245 SECONDS (ref 74–150)
ANION GAP SERPL CALCULATED.3IONS-SCNC: 9 MMOL/L (ref 7–15)
APTT PPP: 33 SECONDS (ref 22–38)
APTT PPP: 64 SECONDS (ref 22–38)
BUN SERPL-MCNC: 20.9 MG/DL (ref 8–23)
CALCIUM SERPL-MCNC: 9.2 MG/DL (ref 8.8–10.2)
CHLORIDE SERPL-SCNC: 93 MMOL/L (ref 98–107)
CHOLEST SERPL-MCNC: 135 MG/DL
CREAT SERPL-MCNC: 0.8 MG/DL (ref 0.51–0.95)
DEPRECATED HCO3 PLAS-SCNC: 33 MMOL/L (ref 22–29)
ERYTHROCYTE [DISTWIDTH] IN BLOOD BY AUTOMATED COUNT: 16.5 % (ref 10–15)
GFR SERPL CREATININE-BSD FRML MDRD: 75 ML/MIN/1.73M2
GLUCOSE SERPL-MCNC: 110 MG/DL (ref 70–99)
HCT VFR BLD AUTO: 36.3 % (ref 35–47)
HDLC SERPL-MCNC: 70 MG/DL
HGB BLD-MCNC: 11.4 G/DL (ref 11.7–15.7)
LDLC SERPL CALC-MCNC: 47 MG/DL
MCH RBC QN AUTO: 26.9 PG (ref 26.5–33)
MCHC RBC AUTO-ENTMCNC: 31.4 G/DL (ref 31.5–36.5)
MCV RBC AUTO: 86 FL (ref 78–100)
NONHDLC SERPL-MCNC: 65 MG/DL
PLATELET # BLD AUTO: 310 10E3/UL (ref 150–450)
POTASSIUM SERPL-SCNC: 4.1 MMOL/L (ref 3.4–5.3)
RBC # BLD AUTO: 4.24 10E6/UL (ref 3.8–5.2)
SODIUM SERPL-SCNC: 135 MMOL/L (ref 136–145)
TRIGL SERPL-MCNC: 91 MG/DL
TROPONIN T SERPL HS-MCNC: 20 NG/L
WBC # BLD AUTO: 14.6 10E3/UL (ref 4–11)

## 2023-05-30 PROCEDURE — 250N000013 HC RX MED GY IP 250 OP 250 PS 637: Performed by: PHYSICIAN ASSISTANT

## 2023-05-30 PROCEDURE — 80048 BASIC METABOLIC PNL TOTAL CA: CPT | Performed by: INTERNAL MEDICINE

## 2023-05-30 PROCEDURE — 250N000011 HC RX IP 250 OP 636: Performed by: INTERNAL MEDICINE

## 2023-05-30 PROCEDURE — C1874 STENT, COATED/COV W/DEL SYS: HCPCS | Performed by: INTERNAL MEDICINE

## 2023-05-30 PROCEDURE — 027135Z DILATION OF CORONARY ARTERY, TWO ARTERIES WITH TWO DRUG-ELUTING INTRALUMINAL DEVICES, PERCUTANEOUS APPROACH: ICD-10-PCS | Performed by: INTERNAL MEDICINE

## 2023-05-30 PROCEDURE — 85730 THROMBOPLASTIN TIME PARTIAL: CPT | Performed by: INTERNAL MEDICINE

## 2023-05-30 PROCEDURE — 85027 COMPLETE CBC AUTOMATED: CPT | Performed by: INTERNAL MEDICINE

## 2023-05-30 PROCEDURE — 999N000127 HC STATISTIC PERIPHERAL IV START W US GUIDANCE

## 2023-05-30 PROCEDURE — C9600 PERC DRUG-EL COR STENT SING: HCPCS | Performed by: INTERNAL MEDICINE

## 2023-05-30 PROCEDURE — 92928 PRQ TCAT PLMT NTRAC ST 1 LES: CPT | Mod: 76 | Performed by: INTERNAL MEDICINE

## 2023-05-30 PROCEDURE — 85347 COAGULATION TIME ACTIVATED: CPT

## 2023-05-30 PROCEDURE — 36415 COLL VENOUS BLD VENIPUNCTURE: CPT | Performed by: INTERNAL MEDICINE

## 2023-05-30 PROCEDURE — 272N000001 HC OR GENERAL SUPPLY STERILE: Performed by: INTERNAL MEDICINE

## 2023-05-30 PROCEDURE — 97530 THERAPEUTIC ACTIVITIES: CPT | Mod: GP | Performed by: PHYSICAL THERAPIST

## 2023-05-30 PROCEDURE — 250N000013 HC RX MED GY IP 250 OP 250 PS 637: Performed by: INTERNAL MEDICINE

## 2023-05-30 PROCEDURE — 85730 THROMBOPLASTIN TIME PARTIAL: CPT | Performed by: STUDENT IN AN ORGANIZED HEALTH CARE EDUCATION/TRAINING PROGRAM

## 2023-05-30 PROCEDURE — 99152 MOD SED SAME PHYS/QHP 5/>YRS: CPT | Performed by: INTERNAL MEDICINE

## 2023-05-30 PROCEDURE — 93454 CORONARY ARTERY ANGIO S&I: CPT | Performed by: INTERNAL MEDICINE

## 2023-05-30 PROCEDURE — 36416 COLLJ CAPILLARY BLOOD SPEC: CPT | Performed by: INTERNAL MEDICINE

## 2023-05-30 PROCEDURE — B211YZZ FLUOROSCOPY OF MULTIPLE CORONARY ARTERIES USING OTHER CONTRAST: ICD-10-PCS | Performed by: INTERNAL MEDICINE

## 2023-05-30 PROCEDURE — 120N000001 HC R&B MED SURG/OB

## 2023-05-30 PROCEDURE — 93005 ELECTROCARDIOGRAM TRACING: CPT

## 2023-05-30 PROCEDURE — C9601 PERC DRUG-EL COR STENT BRAN: HCPCS | Performed by: INTERNAL MEDICINE

## 2023-05-30 PROCEDURE — 99233 SBSQ HOSP IP/OBS HIGH 50: CPT | Performed by: INTERNAL MEDICINE

## 2023-05-30 PROCEDURE — 99232 SBSQ HOSP IP/OBS MODERATE 35: CPT | Mod: 25 | Performed by: NURSE PRACTITIONER

## 2023-05-30 PROCEDURE — 99153 MOD SED SAME PHYS/QHP EA: CPT | Performed by: INTERNAL MEDICINE

## 2023-05-30 PROCEDURE — 36415 COLL VENOUS BLD VENIPUNCTURE: CPT | Performed by: STUDENT IN AN ORGANIZED HEALTH CARE EDUCATION/TRAINING PROGRAM

## 2023-05-30 PROCEDURE — 93454 CORONARY ARTERY ANGIO S&I: CPT | Mod: 26 | Performed by: INTERNAL MEDICINE

## 2023-05-30 PROCEDURE — C1894 INTRO/SHEATH, NON-LASER: HCPCS | Performed by: INTERNAL MEDICINE

## 2023-05-30 PROCEDURE — 97116 GAIT TRAINING THERAPY: CPT | Mod: GP | Performed by: PHYSICAL THERAPIST

## 2023-05-30 PROCEDURE — 97110 THERAPEUTIC EXERCISES: CPT | Mod: GP | Performed by: PHYSICAL THERAPIST

## 2023-05-30 PROCEDURE — 80061 LIPID PANEL: CPT | Performed by: INTERNAL MEDICINE

## 2023-05-30 PROCEDURE — C1725 CATH, TRANSLUMIN NON-LASER: HCPCS | Performed by: INTERNAL MEDICINE

## 2023-05-30 PROCEDURE — 258N000003 HC RX IP 258 OP 636: Performed by: INTERNAL MEDICINE

## 2023-05-30 PROCEDURE — 250N000009 HC RX 250: Performed by: INTERNAL MEDICINE

## 2023-05-30 PROCEDURE — 84484 ASSAY OF TROPONIN QUANT: CPT | Performed by: NURSE PRACTITIONER

## 2023-05-30 PROCEDURE — C1887 CATHETER, GUIDING: HCPCS | Performed by: INTERNAL MEDICINE

## 2023-05-30 PROCEDURE — C1769 GUIDE WIRE: HCPCS | Performed by: INTERNAL MEDICINE

## 2023-05-30 DEVICE — STENT CORONARY DES SYNERGY XD MR US 2.50X20MM H7493941820250: Type: IMPLANTABLE DEVICE | Status: FUNCTIONAL

## 2023-05-30 DEVICE — STENT CORONARY DES SYNERGY XD MR US 3.00X20MM H7493941820300: Type: IMPLANTABLE DEVICE | Status: FUNCTIONAL

## 2023-05-30 RX ORDER — NALOXONE HYDROCHLORIDE 0.4 MG/ML
0.2 INJECTION, SOLUTION INTRAMUSCULAR; INTRAVENOUS; SUBCUTANEOUS
Status: ACTIVE | OUTPATIENT
Start: 2023-05-30 | End: 2023-05-30

## 2023-05-30 RX ORDER — FENTANYL CITRATE 50 UG/ML
INJECTION, SOLUTION INTRAMUSCULAR; INTRAVENOUS
Status: DISCONTINUED
Start: 2023-05-30 | End: 2023-05-30 | Stop reason: HOSPADM

## 2023-05-30 RX ORDER — OXYCODONE HYDROCHLORIDE 10 MG/1
10 TABLET ORAL EVERY 4 HOURS PRN
Status: DISCONTINUED | OUTPATIENT
Start: 2023-05-30 | End: 2023-06-01

## 2023-05-30 RX ORDER — ARGATROBAN 1 MG/ML
150 INJECTION, SOLUTION INTRAVENOUS
Status: DISCONTINUED | OUTPATIENT
Start: 2023-05-30 | End: 2023-05-30

## 2023-05-30 RX ORDER — LOSARTAN POTASSIUM 25 MG/1
25 TABLET ORAL ONCE
Status: COMPLETED | OUTPATIENT
Start: 2023-05-30 | End: 2023-05-30

## 2023-05-30 RX ORDER — HEPARIN SODIUM 1000 [USP'U]/ML
INJECTION, SOLUTION INTRAVENOUS; SUBCUTANEOUS
Status: DISCONTINUED
Start: 2023-05-30 | End: 2023-05-30 | Stop reason: HOSPADM

## 2023-05-30 RX ORDER — DOBUTAMINE HYDROCHLORIDE 200 MG/100ML
2-20 INJECTION INTRAVENOUS CONTINUOUS PRN
Status: DISCONTINUED | OUTPATIENT
Start: 2023-05-30 | End: 2023-05-30

## 2023-05-30 RX ORDER — ASPIRIN 325 MG
325 TABLET ORAL ONCE
Status: COMPLETED | OUTPATIENT
Start: 2023-05-30 | End: 2023-05-30

## 2023-05-30 RX ORDER — NITROGLYCERIN 5 MG/ML
VIAL (ML) INTRAVENOUS
Status: DISCONTINUED
Start: 2023-05-30 | End: 2023-05-30 | Stop reason: HOSPADM

## 2023-05-30 RX ORDER — ATROPINE SULFATE 0.1 MG/ML
0.5 INJECTION INTRAVENOUS
Status: ACTIVE | OUTPATIENT
Start: 2023-05-30 | End: 2023-05-30

## 2023-05-30 RX ORDER — ONDANSETRON 2 MG/ML
4 INJECTION INTRAMUSCULAR; INTRAVENOUS EVERY 6 HOURS PRN
Status: DISCONTINUED | OUTPATIENT
Start: 2023-05-30 | End: 2023-05-30

## 2023-05-30 RX ORDER — NITROGLYCERIN 0.4 MG/1
0.4 TABLET SUBLINGUAL EVERY 5 MIN PRN
Status: DISCONTINUED | OUTPATIENT
Start: 2023-05-30 | End: 2023-06-01 | Stop reason: HOSPADM

## 2023-05-30 RX ORDER — CLOPIDOGREL BISULFATE 75 MG/1
TABLET ORAL
Status: DISCONTINUED | OUTPATIENT
Start: 2023-05-30 | End: 2023-05-30

## 2023-05-30 RX ORDER — VERAPAMIL HYDROCHLORIDE 2.5 MG/ML
INJECTION, SOLUTION INTRAVENOUS
Status: DISCONTINUED | OUTPATIENT
Start: 2023-05-30 | End: 2023-05-30

## 2023-05-30 RX ORDER — LOSARTAN POTASSIUM 50 MG/1
50 TABLET ORAL DAILY
Status: DISCONTINUED | OUTPATIENT
Start: 2023-05-31 | End: 2023-06-01

## 2023-05-30 RX ORDER — LIDOCAINE 40 MG/G
CREAM TOPICAL
Status: DISCONTINUED | OUTPATIENT
Start: 2023-05-30 | End: 2023-05-30

## 2023-05-30 RX ORDER — ACETAMINOPHEN 325 MG/1
650 TABLET ORAL EVERY 4 HOURS PRN
Status: DISCONTINUED | OUTPATIENT
Start: 2023-05-30 | End: 2023-06-01 | Stop reason: HOSPADM

## 2023-05-30 RX ORDER — SODIUM CHLORIDE 9 MG/ML
INJECTION, SOLUTION INTRAVENOUS CONTINUOUS
Status: ACTIVE | OUTPATIENT
Start: 2023-05-30 | End: 2023-05-30

## 2023-05-30 RX ORDER — ONDANSETRON 4 MG/1
4 TABLET, ORALLY DISINTEGRATING ORAL EVERY 6 HOURS PRN
Status: DISCONTINUED | OUTPATIENT
Start: 2023-05-30 | End: 2023-05-30

## 2023-05-30 RX ORDER — HEPARIN SODIUM 10000 [USP'U]/100ML
100-1000 INJECTION, SOLUTION INTRAVENOUS CONTINUOUS PRN
Status: DISCONTINUED | OUTPATIENT
Start: 2023-05-30 | End: 2023-05-30

## 2023-05-30 RX ORDER — POTASSIUM CHLORIDE 1500 MG/1
20 TABLET, EXTENDED RELEASE ORAL
Status: DISCONTINUED | OUTPATIENT
Start: 2023-05-30 | End: 2023-05-30

## 2023-05-30 RX ORDER — VERAPAMIL HYDROCHLORIDE 2.5 MG/ML
INJECTION, SOLUTION INTRAVENOUS
Status: DISCONTINUED
Start: 2023-05-30 | End: 2023-05-30 | Stop reason: HOSPADM

## 2023-05-30 RX ORDER — ATORVASTATIN CALCIUM 40 MG/1
40 TABLET, FILM COATED ORAL DAILY
Status: DISCONTINUED | OUTPATIENT
Start: 2023-05-30 | End: 2023-06-01 | Stop reason: HOSPADM

## 2023-05-30 RX ORDER — SODIUM CHLORIDE 9 MG/ML
INJECTION, SOLUTION INTRAVENOUS CONTINUOUS
Status: DISCONTINUED | OUTPATIENT
Start: 2023-05-30 | End: 2023-05-30

## 2023-05-30 RX ORDER — CLOPIDOGREL 300 MG/1
TABLET, FILM COATED ORAL
Status: DISCONTINUED
Start: 2023-05-30 | End: 2023-05-30 | Stop reason: HOSPADM

## 2023-05-30 RX ORDER — EPTIFIBATIDE 2 MG/ML
15 INJECTION, SOLUTION INTRAVENOUS CONTINUOUS PRN
Status: DISCONTINUED | OUTPATIENT
Start: 2023-05-30 | End: 2023-05-30

## 2023-05-30 RX ORDER — FLUMAZENIL 0.1 MG/ML
0.2 INJECTION, SOLUTION INTRAVENOUS
Status: ACTIVE | OUTPATIENT
Start: 2023-05-30 | End: 2023-05-30

## 2023-05-30 RX ORDER — NITROGLYCERIN 5 MG/ML
VIAL (ML) INTRAVENOUS
Status: DISCONTINUED | OUTPATIENT
Start: 2023-05-30 | End: 2023-05-30

## 2023-05-30 RX ORDER — FENTANYL CITRATE 50 UG/ML
25 INJECTION, SOLUTION INTRAMUSCULAR; INTRAVENOUS
Status: DISCONTINUED | OUTPATIENT
Start: 2023-05-30 | End: 2023-06-01 | Stop reason: HOSPADM

## 2023-05-30 RX ORDER — ASPIRIN 81 MG/1
81 TABLET, CHEWABLE ORAL ONCE
Status: COMPLETED | OUTPATIENT
Start: 2023-05-30 | End: 2023-05-30

## 2023-05-30 RX ORDER — EPTIFIBATIDE 2 MG/ML
180 INJECTION, SOLUTION INTRAVENOUS EVERY 10 MIN PRN
Status: DISCONTINUED | OUTPATIENT
Start: 2023-05-30 | End: 2023-05-30

## 2023-05-30 RX ORDER — OXYCODONE HYDROCHLORIDE 5 MG/1
5 TABLET ORAL EVERY 4 HOURS PRN
Status: DISCONTINUED | OUTPATIENT
Start: 2023-05-30 | End: 2023-06-01

## 2023-05-30 RX ORDER — NALOXONE HYDROCHLORIDE 0.4 MG/ML
0.4 INJECTION, SOLUTION INTRAMUSCULAR; INTRAVENOUS; SUBCUTANEOUS
Status: ACTIVE | OUTPATIENT
Start: 2023-05-30 | End: 2023-05-30

## 2023-05-30 RX ORDER — FENTANYL CITRATE 50 UG/ML
INJECTION, SOLUTION INTRAMUSCULAR; INTRAVENOUS
Status: DISCONTINUED | OUTPATIENT
Start: 2023-05-30 | End: 2023-05-30

## 2023-05-30 RX ORDER — ASPIRIN 81 MG/1
81 TABLET ORAL DAILY
Qty: 30 TABLET | Refills: 3 | Status: SHIPPED | OUTPATIENT
Start: 2023-05-31 | End: 2023-06-01

## 2023-05-30 RX ORDER — METOPROLOL TARTRATE 1 MG/ML
5 INJECTION, SOLUTION INTRAVENOUS
Status: DISCONTINUED | OUTPATIENT
Start: 2023-05-30 | End: 2023-06-01 | Stop reason: HOSPADM

## 2023-05-30 RX ORDER — CLOPIDOGREL BISULFATE 75 MG/1
75 TABLET ORAL DAILY
Status: DISCONTINUED | OUTPATIENT
Start: 2023-05-31 | End: 2023-06-01 | Stop reason: HOSPADM

## 2023-05-30 RX ORDER — LORAZEPAM 0.5 MG/1
0.5 TABLET ORAL
Status: DISCONTINUED | OUTPATIENT
Start: 2023-05-30 | End: 2023-05-30

## 2023-05-30 RX ORDER — IOPAMIDOL 755 MG/ML
INJECTION, SOLUTION INTRAVASCULAR
Status: DISCONTINUED | OUTPATIENT
Start: 2023-05-30 | End: 2023-05-30

## 2023-05-30 RX ORDER — ATORVASTATIN CALCIUM 40 MG/1
40 TABLET, FILM COATED ORAL DAILY
Qty: 90 TABLET | Refills: 3 | Status: SHIPPED | OUTPATIENT
Start: 2023-05-30 | End: 2023-11-07

## 2023-05-30 RX ORDER — ASPIRIN 81 MG/1
81 TABLET ORAL DAILY
Status: DISCONTINUED | OUTPATIENT
Start: 2023-05-31 | End: 2023-06-01 | Stop reason: HOSPADM

## 2023-05-30 RX ORDER — LORAZEPAM 2 MG/ML
0.5 INJECTION INTRAMUSCULAR
Status: DISCONTINUED | OUTPATIENT
Start: 2023-05-30 | End: 2023-05-30

## 2023-05-30 RX ORDER — HEPARIN SODIUM 1000 [USP'U]/ML
INJECTION, SOLUTION INTRAVENOUS; SUBCUTANEOUS
Status: DISCONTINUED | OUTPATIENT
Start: 2023-05-30 | End: 2023-05-30

## 2023-05-30 RX ORDER — ARGATROBAN 1 MG/ML
350 INJECTION, SOLUTION INTRAVENOUS
Status: DISCONTINUED | OUTPATIENT
Start: 2023-05-30 | End: 2023-05-30

## 2023-05-30 RX ORDER — HYDRALAZINE HYDROCHLORIDE 20 MG/ML
10 INJECTION INTRAMUSCULAR; INTRAVENOUS EVERY 4 HOURS PRN
Status: DISCONTINUED | OUTPATIENT
Start: 2023-05-30 | End: 2023-06-01 | Stop reason: HOSPADM

## 2023-05-30 RX ORDER — ASPIRIN 81 MG/1
243 TABLET, CHEWABLE ORAL ONCE
Status: COMPLETED | OUTPATIENT
Start: 2023-05-30 | End: 2023-05-30

## 2023-05-30 RX ORDER — DOPAMINE HYDROCHLORIDE 160 MG/100ML
2-20 INJECTION, SOLUTION INTRAVENOUS CONTINUOUS PRN
Status: DISCONTINUED | OUTPATIENT
Start: 2023-05-30 | End: 2023-05-30

## 2023-05-30 RX ADMIN — LOSARTAN POTASSIUM 25 MG: 25 TABLET, FILM COATED ORAL at 08:21

## 2023-05-30 RX ADMIN — HEPARIN SODIUM AND DEXTROSE 1200 UNITS/HR: 10000; 5 INJECTION INTRAVENOUS at 08:25

## 2023-05-30 RX ADMIN — POTASSIUM CHLORIDE 40 MEQ: 1500 TABLET, EXTENDED RELEASE ORAL at 08:22

## 2023-05-30 RX ADMIN — ASPIRIN 325 MG: 325 TABLET ORAL at 09:50

## 2023-05-30 RX ADMIN — LEVOTHYROXINE SODIUM 112 MCG: 0.11 TABLET ORAL at 06:41

## 2023-05-30 RX ADMIN — FUROSEMIDE 80 MG: 10 INJECTION, SOLUTION INTRAVENOUS at 11:02

## 2023-05-30 RX ADMIN — METOPROLOL SUCCINATE 25 MG: 25 TABLET, EXTENDED RELEASE ORAL at 17:30

## 2023-05-30 RX ADMIN — APIXABAN 5 MG: 5 TABLET, FILM COATED ORAL at 20:28

## 2023-05-30 RX ADMIN — LOSARTAN POTASSIUM 25 MG: 25 TABLET, FILM COATED ORAL at 17:30

## 2023-05-30 RX ADMIN — ATORVASTATIN CALCIUM 40 MG: 40 TABLET, FILM COATED ORAL at 17:30

## 2023-05-30 RX ADMIN — METOPROLOL SUCCINATE 25 MG: 25 TABLET, EXTENDED RELEASE ORAL at 08:21

## 2023-05-30 ASSESSMENT — ACTIVITIES OF DAILY LIVING (ADL)
ADLS_ACUITY_SCORE: 36

## 2023-05-30 NOTE — PLAN OF CARE
10:05 Provider notification: Old IV site (right fore arm) was infiltrated, doctor notified.     Right arm placed on pillow and cool pack applied. Area of infiltration outlined.    10.33 RN flyer placed new IV using ultrasound.      1130 New IV site sore and looking puffy. Heparin & NS paused. Cath lab notified.  Sent request to vascular access to help place another new piv.    11:46 Cath lab staff here to  pt for angio. Cath lab staff updated about sore IV site.    1413: Shift Summary:  Patient admitted for new CHF. Alert and oriented. Ambulates 1-2 with gait belt and walker. Dysnea on exertion, no cough, 95% RA.  Tele; Afib, CVR. NPO for procedure today. Incontinent of bladder. Pure wick at night. Edema to bilateral lower extremities. PT/OT following. Continue IV lasix. Angiogram today. TCU when ready to discharge    Heart Failure Care Map  GOALS TO BE MET BEFORE DISCHARGE:    1. Decrease congestion and/or edema with diuretic therapy to achieve near optimal volume status.     Dyspnea improved: Yes, satisfactory for discharge.   Edema improved: No, further care required to meet this goal. Please explain 2-3 + EDEMA LEGS AND ARMS        Last 24 hour I/O:   Intake/Output Summary (Last 24 hours) at 5/30/2023 1544  Last data filed at 5/30/2023 0643  Gross per 24 hour   Intake 360 ml   Output 2200 ml   Net -1840 ml           Net I/O and Weights since admission:   04/30 2300 - 05/30 2259  In: 1960 [P.O.:1960]  Out: 67596 [Urine:52154]  Net: -26262     Vitals:    05/25/23 1755 05/25/23 1958 05/26/23 0621 05/27/23 0647   Weight: (!) 155.5 kg (342 lb 13 oz) (!) 155.5 kg (342 lb 13 oz) (!) 152.5 kg (336 lb 4.8 oz) 148.6 kg (327 lb 11.2 oz)    05/28/23 0608 05/29/23 0523 05/30/23 0534   Weight: 149.7 kg (330 lb 0.5 oz) 141.5 kg (311 lb 15.2 oz) 135.7 kg (299 lb 2.6 oz)       2.  O2 sats > 90% on room air, or at prior home O2 therapy level.      Able to wean O2 this shift to keep sats above 90%?: Yes, satisfactory for  discharge.   Does patient use Home O2? No          Current oxygenation status:   SpO2: 100 %     O2 Device: Nasal cannula, Oxygen Delivery: 2 LPM    3.  Tolerates ambulation and mobility near baseline.     Ambulation: No, further care required to meet this goal. Please explain Requires assistance 1 -2 to transfer and ambulate.   Times patient ambulated with staff this shift: 3. To bathroom, 1 time in hallway with PT    Please review the Heart Failure Care Map for additional HF goal outcomes.    Odell Campos RN  5/30/2023       1430: Patient Transfer Information    Back from Angio.     Patient connected to monitoring equipment on arrival:Yes     Patient connected to wall oxygen on arrival: N/A 92%RA    Safety check: yes

## 2023-05-30 NOTE — PROGRESS NOTES
Cardiology Progress Note  Laurie ELDER, CNP          Assessment and Plan:     Admit (5/25/23) via EMS after suffering a fall.  Few month hx of progressive weakness, SOB, cough, SAL   Evidence of NEW rapid A-fib, significant hypertension, HFpEF exacerbation    PMH:  Hypertension, hypothyroidism, obesity    New A-fib with RVR, asymptomatic  -persistent since admit, likely has been in A-fib for some time   -rate control planned: HRs now 90s on Metoprolol XR 25 mg BID  -long term AC recommended  (CHADS2 Vasc score: 5)  -currently on Heparin, Eliquis on hold for cath    Acute Systolic HF exacerbation  -BNP 5500 / pulmonary vascular congestion, small pleural effusions  -LVEF 25-30% (global), mild RV dysfunction, 1+ MR, RVSP 40 mmhg  -improved symptoms with IV Lasix 80 mg BID  -weight down 43 lbs  ( -12,000 total OP/ -5000 OP overnight)  -etiology of decline unclear: ischemia to be ruled out with cath today  -Metoprolol XR added to her usual Losartan  Consider increasing Metoprolol  -CORE outpatient to consider adding Spironolactone and changing Losartan to Entresto    Troponin Elevation:  -21, will trend  -etiology likely demand ischemia  -no hx of CAD, but multiple CVRF  -Plan for coronary angiography today  Discussed risks and benefits to include but not limited to:  Bleeding, reaction to sedation, IV-dye nephropathy, arterial injury, dysrhythmias, stroke, MI, emergent CABG, death  Pt in agreement and willing to proceed.   -consider statin  (LDL 38)    Hypertension:  -BP controlled    Suspected Sleep Apnea:  -outpatient sleep evaluation recommended    Hypothyroidism:  -TSH 7.02               Interval History:     No orthopnea  SOB improved  Denies palpitations or chest pain                Medications:       [Held by provider] apixaban ANTICOAGULANT  5 mg Oral BID     furosemide  80 mg Intravenous QAM     levothyroxine  112 mcg Oral QAM AC     losartan  25 mg Oral BID     menthol   Transdermal Q8H     metoprolol  "succinate ER  25 mg Oral BID     potassium chloride  40 mEq Oral Daily     sodium chloride (PF)  3 mL Intracatheter Q8H            Physical Exam:   Blood pressure 129/75, pulse 77, temperature 97.9  F (36.6  C), temperature source Oral, resp. rate 16, height 1.676 m (5' 6\"), weight 135.7 kg (299 lb 2.6 oz), SpO2 94 %, not currently breastfeeding.  Wt Readings from Last 3 Encounters:   05/30/23 135.7 kg (299 lb 2.6 oz)     I/O last 3 completed shifts:  In: 360 [P.O.:360]  Out: 5400 [Urine:5400]    CONST:  Alert and oriented  NAD  LUNGS:  Crackles in bases bilaterally  CARDIO:  Irreg, Irreg  No murmurs  ABD:  Obese, soft  EXT:  2+ shin edema bilaterally           Data:   TELE:  A-fib  HRs 90s    CBC  Recent Labs   Lab 05/30/23  0809 05/29/23  1254   WBC 14.6* 14.4*   HGB 11.4* 12.0    371       BMP  Recent Labs   Lab 05/30/23  0723 05/29/23  0626 05/28/23  0559 05/27/23  0659   * 136 135* 133*   POTASSIUM 4.1 3.8 4.0 4.0   CHLORIDE 93* 93* 96* 95*   DANIE 9.2 9.0 8.8 8.9   CO2 33* 33* 30* 27   BUN 20.9 23.4* 23.6* 25.5*   CR 0.80 0.86 0.82 0.90   * 105* 108* 99     Recent Labs   Lab Test 05/26/23  0553   CHOL 115   HDL 61   LDL 38   TRIG 82       TROP  Lab Results   Component Value Date    TROPI <0.012 04/27/2013       BNP  Recent Labs   Lab 05/25/23  1610   NTBNPI 5,524*           "

## 2023-05-30 NOTE — PROGRESS NOTES
[facility]  Hospitalist Progress Note  Jeremy Cardoza MD 05/30/2023    Reason for Stay (Diagnosis): CHF, new dx of CM         Assessment and Plan:      Summary of Stay: Fanny Myers is a  78 year old female with PMHx significant for HTN, hypothyroidism and obesity, who was admitted on 5/25/2023 with sob and a fib with RVR. She notes sx started after a URI this spring that she suspects was RSV.  She presented for concerns about generalized weakness, SOB, and LE edema     Work up included TTE showing a new dx of CM with EF near 25%.  She is being treated with IV Lasix for diuresis with significant diuresis this admission; weight (kg) 155 -> 135     Plans today:  - Lasix 80 mg IV BID changed to 80 mg IV daily by cards today  - continue to monitor ins/outs and daily weight  - daily BMP while diuresing  - OOB, mobilize, rehab therapy  - cards planning angiogram today to r/o ischemic cause of her new CM  - DOAC on hold and receiving IV heparin gtt for AC given plans for cath today  - rehab recommending TCU on discharge     1. A fib with RVR  - started DOAC for AC (currently on hold for cath)  - rate currently controlled on low dose metoprolol     2.  Acute systolic heart failure exacerbation with new dx of CM this admit  - new dx of systolic HF this admit  - TTE shows EF near 25% with global LV dysftn  - ? Etiology.  Consider rate related due to afib; possible viral CM related to recent URI; consider ischemia  - continue Lasix for diuresis  - on Losartan  - on metoprolol  - cardiology consult appreciated; angiogram being planned after further diuresis.       2. Hypothyroidism  - Resumed home levothyroxine.   - TSH slightly elevated at 7; T4 normal     3. HTN  - Resumed home losartan with parameters     4. Obesity   - Complicates cares     5. Hyponatremia  - suspect due to volume overload           Diet: Low Saturated Fat Na <2400 mg    DVT Prophylaxis: Enoxaparin (Lovenox) SQ  Ervin  "Catheter: Not present  Lines: None     Cardiac Monitoring: None  Code Status:   Full Code, ok with short trial of resuscitation. Would not want prolonged effort  DISPO:  TCU currently recommended.  can discharge after cardiac work up and when IV diuresis completed.  Anticipate 2-3 days        Interval History (Subjective):      No SOB.  Overall feels well.  Heart cath being arranged by cards today                  Physical Exam:      Last Vital Signs:  /75 (BP Location: Right arm)   Pulse 77   Temp 97.9  F (36.6  C) (Oral)   Resp 16   Ht 1.676 m (5' 6\")   Wt 135.7 kg (299 lb 2.6 oz)   SpO2 94%   BMI 48.29 kg/m        Intake/Output Summary (Last 24 hours) at 5/30/2023 1053  Last data filed at 5/30/2023 0643  Gross per 24 hour   Intake 360 ml   Output 4300 ml   Net -3940 ml       Constitutional: Awake, alert, cooperative, no apparent distress   Respiratory: Clear to auscultation bilaterally, no crackles or wheezing   Cardiovascular: Regular rate and rhythm, normal S1 and S2, and no murmur noted   Abdomen: Normal bowel sounds, soft, non-distended, non-tender   Skin: No rashes, no cyanosis, dry to touch   Neuro: Alert and oriented x3, no weakness, numbness, memory loss   Extremities: Trace pitting edema BLE, normal range of motion   Other(s):        All other systems: Negative          Medications:      All current medications were reviewed with changes reflected in problem list.         Data:      All new lab and imaging data was reviewed.   Labs:  Recent Labs   Lab 05/30/23  0723   *   POTASSIUM 4.1   CHLORIDE 93*   CO2 33*   ANIONGAP 9   *   BUN 20.9   CR 0.80   GFRESTIMATED 75   DANIE 9.2     Recent Labs   Lab 05/30/23  0809   WBC 14.6*   HGB 11.4*   HCT 36.3   MCV 86         Imaging:   No results found for this or any previous visit (from the past 24 hour(s)).   "

## 2023-05-30 NOTE — PLAN OF CARE
"Goal Outcome Evaluation:      Plan of Care Reviewed With: patient    Overall Patient Progress: improvingOverall Patient Progress: improving     Vitals: /80 (BP Location: Left arm)   Pulse 83   Temp 97.6  F (36.4  C) (Oral)   Resp 16   Ht 1.676 m (5' 6\")   Wt 141.5 kg (311 lb 15.2 oz)   SpO2 93%   BMI 50.35 kg/m      Primary Diagnosis: Atrial fibrillation  Pain: Denies  Orientation: A&Ox4  GI/: Purewick intact and draining  LDA: RFA infusing heparin @ 1200 units/hr  Skin: BLE edema, erythema to sacrum/coccyx  Bowel sounds: Active  Respiratory: Clear/diminished  Diet: 2 gm Na, 2L, NPO starting @ midnight  Activity: 2A GB/walker  Followed by: PT/OT/cardiology  Plan: A-fib now controlled. Holding eliquis, pt. Started on heparin gtt. Plan for TCU @ discharge after further diuresis and cardiac workup.        "

## 2023-05-30 NOTE — PROGRESS NOTES
[facility]    Called Fanny via hospital bedside phone and discussed with her need for 1 week cardiology CORE follow up appt.  She believes 6/6/23 will work, but needs to confirm with her transportation.  Informed her appt info details will be on her hospital discharge AVS printouts.  She will call back if needs to reschedule.  She voices understanding and denies further questions or concerns at this time.     Future Appointments   Date Time Provider Department Center   5/31/2023 10:30 AM RH OT 1 WAITLIST [facility] [place] [facility]   5/31/2023 11:15 AM Blanca Cornell, PT [facility] [place] [facility]   6/6/2023 12:45 PM RU LAB [facility] [place] [facility]   6/6/2023  1:30 [facility]   7/10/2023  2:45 PM 2, [facility] Cardiac Rehab [facility] [place] [facility]       Nadia Victoria RN BSN   [facility] [place] [place]  [facility] Care Coordinator  05/30/23, 4:55 PM         Patient notified of MD message, trans to recpt to schedule cpx

## 2023-05-30 NOTE — PROGRESS NOTES
RN giving report: Amirah FLOYD RN    RN receiving report:       S:  Procedure performed: Cor Angio-   PCI CX-LONG x1  Diag     -LONG X1    RCA to be fixed at a later date      B:  Why procedure needed:   Admitted with New Afib- Edema and weakness  A:  Assessment of area:   L Rad arterial Access   TR Band on 1410 - 10ml  Total Heparin [id number]     R:  Recommendations:   Cont Plavix        Family updated: DTR per MD

## 2023-05-30 NOTE — PLAN OF CARE
"  BP (!) 145/78 (BP Location: Left arm)   Pulse 86   Temp 97.4  F (36.3  C) (Oral)   Resp 18   Ht 1.676 m (5' 6\")   Wt 141.5 kg (311 lb 15.2 oz)   SpO2 99%   BMI 50.35 kg/m      Pt is alert and oriented, A x 1 with a walker and GB, on Heparin drip, PTT at 0230, NPO at midnight, Angiogram tomorrow.Pt c/o heartburn, Admitting page to order Tums as pt stated that she takes it at home, has a sada wick in place, voiding w/o difficulty. Continue to cluster care to promote healing and reduce risk for delirium. Will continue current care and treatment plan this shift until hand off at 2300 to oncoming RN. No critical events of note 05/29/23     Ivana Talbot RN 05/29/23 10:28 PM      "

## 2023-05-31 ENCOUNTER — APPOINTMENT (OUTPATIENT)
Dept: PHYSICAL THERAPY | Facility: CLINIC | Age: 78
DRG: 246 | End: 2023-05-31
Attending: INTERNAL MEDICINE
Payer: COMMERCIAL

## 2023-05-31 ENCOUNTER — APPOINTMENT (OUTPATIENT)
Dept: OCCUPATIONAL THERAPY | Facility: CLINIC | Age: 78
DRG: 246 | End: 2023-05-31
Payer: COMMERCIAL

## 2023-05-31 LAB
ANION GAP SERPL CALCULATED.3IONS-SCNC: 15 MMOL/L (ref 7–15)
ATRIAL RATE - MUSE: 104 BPM
ATRIAL RATE - MUSE: 68 BPM
BUN SERPL-MCNC: 20 MG/DL (ref 8–23)
CALCIUM SERPL-MCNC: 9.3 MG/DL (ref 8.8–10.2)
CHLORIDE SERPL-SCNC: 93 MMOL/L (ref 98–107)
CREAT SERPL-MCNC: 0.69 MG/DL (ref 0.51–0.95)
DEPRECATED HCO3 PLAS-SCNC: 23 MMOL/L (ref 22–29)
DIASTOLIC BLOOD PRESSURE - MUSE: NORMAL MMHG
DIASTOLIC BLOOD PRESSURE - MUSE: NORMAL MMHG
ERYTHROCYTE [DISTWIDTH] IN BLOOD BY AUTOMATED COUNT: 16.8 % (ref 10–15)
GFR SERPL CREATININE-BSD FRML MDRD: 88 ML/MIN/1.73M2
GLUCOSE SERPL-MCNC: 102 MG/DL (ref 70–99)
HCT VFR BLD AUTO: 38.8 % (ref 35–47)
HGB BLD-MCNC: 11.7 G/DL (ref 11.7–15.7)
HOLD SPECIMEN: NORMAL
INTERPRETATION ECG - MUSE: NORMAL
INTERPRETATION ECG - MUSE: NORMAL
MCH RBC QN AUTO: 26.6 PG (ref 26.5–33)
MCHC RBC AUTO-ENTMCNC: 30.2 G/DL (ref 31.5–36.5)
MCV RBC AUTO: 88 FL (ref 78–100)
P AXIS - MUSE: NORMAL DEGREES
P AXIS - MUSE: NORMAL DEGREES
PLATELET # BLD AUTO: 318 10E3/UL (ref 150–450)
POTASSIUM SERPL-SCNC: 5.6 MMOL/L (ref 3.4–5.3)
PR INTERVAL - MUSE: NORMAL MS
PR INTERVAL - MUSE: NORMAL MS
QRS DURATION - MUSE: 88 MS
QRS DURATION - MUSE: 90 MS
QT - MUSE: 320 MS
QT - MUSE: 382 MS
QTC - MUSE: 395 MS
QTC - MUSE: 446 MS
R AXIS - MUSE: 21 DEGREES
R AXIS - MUSE: 6 DEGREES
RBC # BLD AUTO: 4.4 10E6/UL (ref 3.8–5.2)
SODIUM SERPL-SCNC: 131 MMOL/L (ref 136–145)
SYSTOLIC BLOOD PRESSURE - MUSE: NORMAL MMHG
SYSTOLIC BLOOD PRESSURE - MUSE: NORMAL MMHG
T AXIS - MUSE: -45 DEGREES
T AXIS - MUSE: 71 DEGREES
VENTRICULAR RATE- MUSE: 82 BPM
VENTRICULAR RATE- MUSE: 92 BPM
WBC # BLD AUTO: 12.5 10E3/UL (ref 4–11)

## 2023-05-31 PROCEDURE — 250N000013 HC RX MED GY IP 250 OP 250 PS 637: Performed by: INTERNAL MEDICINE

## 2023-05-31 PROCEDURE — 36415 COLL VENOUS BLD VENIPUNCTURE: CPT | Performed by: INTERNAL MEDICINE

## 2023-05-31 PROCEDURE — 99232 SBSQ HOSP IP/OBS MODERATE 35: CPT | Performed by: INTERNAL MEDICINE

## 2023-05-31 PROCEDURE — 85027 COMPLETE CBC AUTOMATED: CPT | Performed by: INTERNAL MEDICINE

## 2023-05-31 PROCEDURE — 99232 SBSQ HOSP IP/OBS MODERATE 35: CPT | Mod: FS | Performed by: PHYSICIAN ASSISTANT

## 2023-05-31 PROCEDURE — 97530 THERAPEUTIC ACTIVITIES: CPT | Mod: GP

## 2023-05-31 PROCEDURE — 120N000001 HC R&B MED SURG/OB

## 2023-05-31 PROCEDURE — 250N000013 HC RX MED GY IP 250 OP 250 PS 637: Performed by: PHYSICIAN ASSISTANT

## 2023-05-31 PROCEDURE — 250N000011 HC RX IP 250 OP 636: Performed by: INTERNAL MEDICINE

## 2023-05-31 PROCEDURE — 93010 ELECTROCARDIOGRAM REPORT: CPT | Performed by: INTERNAL MEDICINE

## 2023-05-31 PROCEDURE — 80048 BASIC METABOLIC PNL TOTAL CA: CPT | Performed by: INTERNAL MEDICINE

## 2023-05-31 RX ORDER — CALCIUM CARBONATE 500 MG/1
500-1000 TABLET, CHEWABLE ORAL 3 TIMES DAILY PRN
Status: DISCONTINUED | OUTPATIENT
Start: 2023-05-31 | End: 2023-06-01 | Stop reason: HOSPADM

## 2023-05-31 RX ORDER — TORSEMIDE 20 MG/1
20 TABLET ORAL DAILY
Status: DISCONTINUED | OUTPATIENT
Start: 2023-05-31 | End: 2023-06-01

## 2023-05-31 RX ORDER — PANTOPRAZOLE SODIUM 40 MG/1
40 TABLET, DELAYED RELEASE ORAL
Status: DISCONTINUED | OUTPATIENT
Start: 2023-05-31 | End: 2023-06-01 | Stop reason: HOSPADM

## 2023-05-31 RX ADMIN — APIXABAN 5 MG: 5 TABLET, FILM COATED ORAL at 20:31

## 2023-05-31 RX ADMIN — APIXABAN 5 MG: 5 TABLET, FILM COATED ORAL at 08:40

## 2023-05-31 RX ADMIN — LOSARTAN POTASSIUM 50 MG: 50 TABLET, FILM COATED ORAL at 08:40

## 2023-05-31 RX ADMIN — LEVOTHYROXINE SODIUM 112 MCG: 0.11 TABLET ORAL at 06:36

## 2023-05-31 RX ADMIN — CALCIUM CARBONATE (ANTACID) CHEW TAB 500 MG 1000 MG: 500 CHEW TAB at 15:01

## 2023-05-31 RX ADMIN — METOPROLOL SUCCINATE 25 MG: 25 TABLET, EXTENDED RELEASE ORAL at 16:51

## 2023-05-31 RX ADMIN — POTASSIUM CHLORIDE 40 MEQ: 1500 TABLET, EXTENDED RELEASE ORAL at 08:39

## 2023-05-31 RX ADMIN — ATORVASTATIN CALCIUM 40 MG: 40 TABLET, FILM COATED ORAL at 08:40

## 2023-05-31 RX ADMIN — CLOPIDOGREL BISULFATE 75 MG: 75 TABLET ORAL at 08:40

## 2023-05-31 RX ADMIN — TORSEMIDE 20 MG: 20 TABLET ORAL at 09:55

## 2023-05-31 RX ADMIN — OXYCODONE HYDROCHLORIDE 5 MG: 5 TABLET ORAL at 21:17

## 2023-05-31 RX ADMIN — ASPIRIN 81 MG: 81 TABLET, COATED ORAL at 08:39

## 2023-05-31 RX ADMIN — PANTOPRAZOLE SODIUM 40 MG: 40 TABLET, DELAYED RELEASE ORAL at 15:01

## 2023-05-31 RX ADMIN — METOPROLOL SUCCINATE 25 MG: 25 TABLET, EXTENDED RELEASE ORAL at 08:39

## 2023-05-31 ASSESSMENT — ACTIVITIES OF DAILY LIVING (ADL)
ADLS_ACUITY_SCORE: 42
ADLS_ACUITY_SCORE: 36
ADLS_ACUITY_SCORE: 40
ADLS_ACUITY_SCORE: 40
ADLS_ACUITY_SCORE: 36
ADLS_ACUITY_SCORE: 36
ADLS_ACUITY_SCORE: 42
ADLS_ACUITY_SCORE: 38
ADLS_ACUITY_SCORE: 40
ADLS_ACUITY_SCORE: 40
ADLS_ACUITY_SCORE: 42
ADLS_ACUITY_SCORE: 40

## 2023-05-31 NOTE — CONSULTS
Patient has Medicare Advantage through [facility].    Farxiga is not covered.    Jardiance  --Upon receipt of RX, Discharge Pharmacy can provide 14 days free.  --Subsequent fills will be $35/mo ($70 for 3 mo at [facility])  --lf/when total drug costs exceed $4,660, price will increase to a 25% coinsurance, equivalent to $141/mo.    Entresto  --Upon receipt of RX, Discharge Pharmacy can provide 1 mo free.  --Subsequent fills will be $35/mo ($70 for 3 mo at [facility])  --lf/when total drug costs exceed $4,660, price will increase to a 25% coinsurance, equivalent to $159/mo.    Maria Luisa Eastman  Pharmacy Technician/Liaison, Discharge Pharmacy   [phone] (voice or text)  [email or web address]

## 2023-05-31 NOTE — PROGRESS NOTES
Care Management Follow Up    Length of Stay (days): 6    Expected Discharge Date: 06/01/2023     Concerns to be Addressed: discharge planning, care coordination/care conferences       Anticipated Discharge Disposition: TCU    Patient/Family in Agreement with the Plan: yes    Additional Information:  SW completed pass for patient to discharge to [facility] on 6/1/23. PAS #: [id number]    RIRI Lyons, [facility]  Inpatient Care Coordination  [facility]  [phone]

## 2023-05-31 NOTE — PROGRESS NOTES
[facility]  Hospitalist Progress Note  Balaji Bojorquez MD 05/31/2023    Reason for Stay (Diagnosis): CHF, new dx of CM         Assessment and Plan:      Summary of Stay: Fanny Myers is a  78 year old female with PMHx significant for HTN, hypothyroidism and obesity, who was admitted on 5/25/2023 with sob and a fib with RVR. She notes sx started after a URI this spring that she suspects was RSV.  She presented for concerns about generalized weakness, SOB, and LE edema     Work up included TTE showing a new dx of CM with EF near 25%.  She is being treated with IV Lasix for diuresis with significant diuresis this admission; weight (kg) 155 -> 135    Cardiac cath completed on 5/30 showed diffuse advanced disease with multiple areas of high grade stenosis.  She underwent stenting of 1st diag and distal circ lesions. There was also a very distal RCA lesion (right posterior atrioventricular artery) that remains at 75% and still be intervened on later as an outpatient.       Plans today:  - continue to monitor ins/outs and daily weight.  Lost IV so will switch to Torsemide 20 mg daily  - daily BMP while diuresing  - OOB, mobilize, rehab therapy  - DOAC on hold and was on IV heparin gtt for AC given plans for cath.  OK to start apixaban per Cards.   - DAPT following stent  - rehab recommending TCU on discharge.  Patient has consented to entering transitional care tomorrow or the next day.     1. A fib with RVR  - started DOAC for AC (currently on hold for cath)  - rate currently controlled on low dose metoprolol     2.  Acute systolic heart failure exacerbation with new dx of CM this admit  - new dx of systolic HF this admit  - TTE shows EF near 25% with global LV dysftn  - possibly related to a fib superimposed on significant ischemic disease  - due to lost IV access, will switch to oral Torsemide.  Monitor output.  - on Losartan  - on metoprolol  - cardiology consult appreciated     2.  "Hypothyroidism  - Resumed home levothyroxine.   - TSH slightly elevated at 7; T4 normal     3. HTN  - Resumed home losartan with parameters     4. Obesity   - Complicates cares     5. Hyponatremia  - suspect due to volume overload    6.  Hyperkalemia probably due to hemolysis.           Diet: Low Saturated Fat Na <2400 mg    DVT Prophylaxis: Enoxaparin (Lovenox) SQ  Ervin Catheter: Not present  Lines: None     Cardiac Monitoring: None  Code Status:   Full Code, ok with short trial of resuscitation. Would not want prolonged effort  DISPO:  TCU currently recommended.  can discharge after cardiac work up and when IV diuresis completed.  Anticipate 1-2 more days        Interval History (Subjective):      Chart reviewed, pt interviewed.    Ms. Myers is very pleasant and interactive.  She indicates she is feeling substantially better.  Upset about nighttime disruptions.  Shortness of breath markedly improved at least at rest.  Still has significant lower extremity swelling.                  Physical Exam:      Last Vital Signs:  /55 (BP Location: Right arm)   Pulse 98   Temp 97.6  F (36.4  C) (Oral)   Resp 18   Ht 1.676 m (5' 6\")   Wt 129.6 kg (285 lb 11.5 oz)   SpO2 93%   BMI 46.12 kg/m        Intake/Output Summary (Last 24 hours) at 5/31/2023 1741  Last data filed at 5/31/2023 1303  Gross per 24 hour   Intake 790 ml   Output 1000 ml   Net -210 ml       Constitutional: Awake, alert, cooperative, no apparent distress   Respiratory: Clear to auscultation bilaterally, no crackles or wheezing   Cardiovascular: Regular rate and rhythm, normal S1 and S2, and no murmur noted   Abdomen: Normal bowel sounds, soft, non-distended, non-tender   Skin: No rashes, no cyanosis, dry to touch   Neuro: Alert and oriented x3, no weakness, numbness, memory loss   Extremities: Trace pitting edema BLE, normal range of motion   Other(s):        All other systems: Negative          Medications:      All current medications were " reviewed with changes reflected in problem list.         Data:      All new lab and imaging data was reviewed.   Labs:  Recent Labs   Lab 05/31/23  0755   *   POTASSIUM 5.6*   CHLORIDE 93*   CO2 23   ANIONGAP 15   *   BUN 20.0   CR 0.69   GFRESTIMATED 88   DANIE 9.3     Recent Labs   Lab 05/31/23  0755   WBC 12.5*   HGB 11.7   HCT 38.8   MCV 88         Imaging:   No results found for this or any previous visit (from the past 24 hour(s)).

## 2023-05-31 NOTE — PLAN OF CARE
"Goal Outcome Evaluation:    /77 (BP Location: Left leg, Cuff Size: Adult Large)   Pulse 103   Temp 97.8  F (36.6  C) (Oral)   Resp 18   Ht 1.676 m (5' 6\")   Wt 135.7 kg (299 lb 2.6 oz)   SpO2 95%   BMI 48.29 kg/m      TIME/DATE: 9413-7017 5/30   Primary DX: new afib, CHF with cardiomyopathy   Pain: denies pain.    Mobility: A1-2 with walker and gb.  Diet: low fat. 2L FR.  Neuro: A&Ox4. Pt is able to make her needs known.   Resp: denies SOB. Pt on RA 95%.  Cardiac/tele: on tele: A fib CVR. Denies chest pain. EF 25-30%. HTN. Scheduled metoprolol, Lipitor and Eliquis given. 1x dose losartan 25mg given.   GI/: external catheter in place with adequate output.  Skin: edematous. 3+ edema to bilat legs. Bruising to bilat forearms. abd rash to folds. Pt refused repositioning. Pt educated and continued to decline. Heels elevated.  LDAs: IV R arm saline locked.   Meds/drips: heparin gtt discontinued after angiogram.  Labs: , K 4.1, trop 20, WBC 14.6  Plan: TCU recommended. discharge after IV diuresis is complete and cardiac work up. Pt is wanting to discharge home instead of TCU.    Pertinent events this shift: angiogram today. 2 stents placed. TR band - all air removed at 2024. TR band off. Arm board in place.         Plan of Care Reviewed With: patient    Overall Patient Progress: improvingOverall Patient Progress: improving           "

## 2023-05-31 NOTE — PLAN OF CARE
1000: Right forearm IV site infiltrated, physician was notified. IV line discontinued and vascular nurse paged. Physician discontinued IV furosemide and torsemide PO ordered.    Shift Report:    1400: Patient is alert and oriented. She denies any pain. She denies any shortness of breath. Patient ate 100% of her meal. Patient incontinent of urine and ambulated 2 assist with walker and gait belt to the bathroom x 1, thereafter was transferred into her chair.

## 2023-05-31 NOTE — PLAN OF CARE
"Pt. A&O, for mobility use walker, gait belt and one assist, telemetry was discontinued today, voiding a lot, but refused to go to the bathroom, incontinent of urine, ate 100% of her meal, has Edema in all extremity +3/+4, los IV Line on right forearm, denied pain. Plan to go to TCU.    /78 (BP Location: Right arm)   Pulse 87   Temp 97.8  F (36.6  C) (Oral)   Resp 18   Ht 1.676 m (5' 6\")   Wt 129.6 kg (285 lb 11.5 oz)   SpO2 94%   BMI 46.12 kg/m     "

## 2023-05-31 NOTE — PLAN OF CARE
"Goal Outcome Evaluation:      Plan of Care Reviewed With: patient    Overall Patient Progress: improvingOverall Patient Progress: improving     Vitals: /55 (BP Location: Right arm)   Pulse 98   Temp 97.6  F (36.4  C) (Oral)   Resp 18   Ht 1.676 m (5' 6\")   Wt 135.7 kg (299 lb 2.6 oz)   SpO2 93%   BMI 48.29 kg/m      Primary Diagnosis: CHF  Pain: Denies  Orientation: A&Ox4  GI/: Purewick   LDA: RFASL  Skin: Edema  Bowel sounds: Active  Respiratory: Diminished  Diet: Low fat, 2L restriction  Activity: 1-2A GB/walker  Followed by: Cardiology  Plan: Angiogram performed today, 2 stents placed. TR band removed, arm band in place on left arm. CMS intact. Heparin gtt discontinued, pt. Restarted on Eliquis. MD recommending TCU for discharge but patient would like to go home.        "

## 2023-05-31 NOTE — PROGRESS NOTES
Care Management Discharge Note    Discharge Date: 06/01/2023       Discharge Disposition: TCU @ [facility]    Discharge Services: TCU    Discharge DME: TCU will provide    Discharge Transportation: [facility] wheelchair  Reviewed out of pocket cost for [facility] transport, $81.80 for base rate and $5.26 per mile to the destination. Pt/family expressed understanding and are agreeable to this.     Private pay costs discussed: transportation costs    Does the patient's insurance plan have a 3 day qualifying hospital stay waiver?  No    PAS Confirmation Code:    Patient/family educated on Medicare website which has current facility and service quality ratings: yes    Education Provided on the Discharge Plan:  yes  Persons Notified of Discharge Plans: patient and daughter  Patient/Family in Agreement with the Plan: yes    Handoff Referral Completed: No    Additional Information:  Met with patient to discuss disposition. Therapy is recommending TCU. Patient is feeling that maybe she isn't needing it but would like writer to speak with her daughter about it.    Spoke with daughter, Luiza. She reports that patient lives alone, daughter is not able to assist her and she needs rehab before returning home as currently she is not able to care for herself.    After discussion with patient and daughterLuiza they have decided that patient would be best serviced going to rehab at [facility].    Discussed transportation. They decided that wheelchair transport would be the safest.    Wheelchair transport set for  tomorrow morning to [facility]. Updated unit, facility, patient and family.    SW will continue to assist and monitor discharge plan.    CECILIA Liang   Inpatient Care Coordination   Supervisor  [facility]  [phone]          CECILIA Crum

## 2023-05-31 NOTE — PROGRESS NOTES
Cardiology Progress Note          Assessment and Plan:     Admit 5/25/23 via EMS after suffering a fall.  Few month hx of progressive weakness, SOB, cough, SAL. Evidence of NEW rapid A-fib, significant hypertension, HFpEF exacerbation.    PMH:  Hypertension, hypothyroidism, obesity          New A-fib with RVR, asymptomatic  -persistent since admit, likely has been in A-fib for some time   -plan is for rate control + AC       -HRs now 70-80s on Metoprolol XR 25 mg BID       -[id number] score is 5, on Eliquis 5 mg BID. Note she is also on ASA and Plavix for recent stenting, see below for recs  -Outpatient sleep evaluation      Acute Systolic HF exacerbation  Etiology likely mixed ischemic and nonischemic with contribution from afib with RVR  -BNP 5500 / pulmonary vascular congestion, small pleural effusions  -Echo 5/26/23: LVEF 25-30% (global), mild RV dysfunction, 1+ MR, RVSP 40 mmhg  -improved symptoms with IV Lasix 80 mg BID. Weight down 336 lbs to 285 lbs.  Net neg 14L.  BMP shows Na a little low at 131 today. K elevated but specimen slightly hemolyzed. No JVD, but some persistent LE edema.    Dr. Bojorquez switched to torsemide 20 mg this AM. BMP tomorrow.  -Toprol XL 25 mg BID, losartan 50 mg  -[facility] outpatient to consider adding Spironolactone and SGLT2 inhibitor, and changing Losartan to Entresto       -Ordered pharmacy price check of Entresto and SGLT2 inhibitors today       -Hold on switching today with low Na today  -Outpatient sleep evaluation      Troponin Elevation  CAD  -HS trop 20  -Cardiac cath 5/30/23:  PCI with LONG to D1, LONG to distal left circumflex artery.  Residual high-grade RPL lesion, plan for staged PCI in about 2 to 4 weeks  -No chest pain  -ASA, Plavix, Eliquis x 1 week; then Eliquis and Plavix for 1 year after stenting completed.  Then Eliquis indefinitely for afib.  -Atorvastatin 40 mg     Hypertension  -BP controlled    Suspected Sleep Apnea  -outpatient sleep evaluation  "recommended    Hypothyroidism  -TSH 7.02  -Levothyroxine                 DISPO: monitor after switch to PO torsemide today, likely home tomorrow    Follow up:  CORE enrollment 6/6/23            Interval History:     No orthopnea  SOB improved  Denies palpitations or chest pain                Medications:       apixaban ANTICOAGULANT  5 mg Oral BID     aspirin  81 mg Oral Daily     atorvastatin  40 mg Oral Daily     clopidogrel  75 mg Oral Daily     levothyroxine  112 mcg Oral QAM AC     losartan  50 mg Oral Daily     menthol   Transdermal Q8H     metoprolol succinate ER  25 mg Oral BID     potassium chloride  40 mEq Oral Daily     sodium chloride (PF)  3 mL Intracatheter Q8H     torsemide  20 mg Oral Daily            Physical Exam:   Blood pressure 124/78, pulse 87, temperature 97.8  F (36.6  C), temperature source Oral, resp. rate 18, height 1.676 m (5' 6\"), weight 129.6 kg (285 lb 11.5 oz), SpO2 94 %, not currently breastfeeding.  Wt Readings from Last 3 Encounters:   05/31/23 129.6 kg (285 lb 11.5 oz)     I/O last 3 completed shifts:  In: 240 [P.O.:240]  Out: 1950 [Urine:1950]    CONST:  Alert and oriented  NAD  LUNGS:  Bibasilar rales  CARDIO:  Irreg, Irreg  No murmurs, no JVD  ABD:  Obese, soft  EXT:  1-2+ shin edema bilaterally. RRA site: 2+ radial pulse, normal Narinder test           Data:   TELE:  A-fib  HRs 70-80s    CBC  Recent Labs   Lab 05/31/23  0755 05/30/23  0809   WBC 12.5* 14.6*   HGB 11.7 11.4*    310       BMP  Recent Labs   Lab 05/31/23  0755 05/30/23  0723 05/29/23  0626 05/28/23  0559   * 135* 136 135*   POTASSIUM 5.6* 4.1 3.8 4.0   CHLORIDE 93* 93* 93* 96*   DANIE 9.3 9.2 9.0 8.8   CO2 23 33* 33* 30*   BUN 20.0 20.9 23.4* 23.6*   CR 0.69 0.80 0.86 0.82   * 110* 105* 108*     Recent Labs   Lab Test 05/26/23  0553   CHOL 115   HDL 61   LDL 38   TRIG 82       TROP  Lab Results   Component Value Date    TROPI <0.012 04/27/2013       BNP  Recent Labs   Lab 05/25/23  1610   NTBNPI " 5,526*

## 2023-06-01 ENCOUNTER — LAB REQUISITION (OUTPATIENT)
Dept: LAB | Facility: CLINIC | Age: 78
End: 2023-06-01
Payer: COMMERCIAL

## 2023-06-01 VITALS
SYSTOLIC BLOOD PRESSURE: 97 MMHG | BODY MASS INDEX: 45.71 KG/M2 | WEIGHT: 284.4 LBS | RESPIRATION RATE: 18 BRPM | OXYGEN SATURATION: 92 % | HEART RATE: 84 BPM | TEMPERATURE: 97.5 F | HEIGHT: 66 IN | DIASTOLIC BLOOD PRESSURE: 50 MMHG

## 2023-06-01 DIAGNOSIS — Z11.1 ENCOUNTER FOR SCREENING FOR RESPIRATORY TUBERCULOSIS: ICD-10-CM

## 2023-06-01 PROBLEM — Z95.5 STATUS POST INSERTION OF DRUG ELUTING CORONARY ARTERY STENT: Status: ACTIVE | Noted: 2023-06-01

## 2023-06-01 PROBLEM — G47.33 OSA (OBSTRUCTIVE SLEEP APNEA): Status: ACTIVE | Noted: 2023-06-01

## 2023-06-01 PROBLEM — R79.89 ELEVATED TROPONIN: Status: ACTIVE | Noted: 2023-06-01

## 2023-06-01 PROBLEM — I21.A1 TYPE 2 MI (MYOCARDIAL INFARCTION) (H): Status: ACTIVE | Noted: 2023-06-01

## 2023-06-01 PROBLEM — I48.0 PAROXYSMAL ATRIAL FIBRILLATION (H): Status: ACTIVE | Noted: 2023-05-25

## 2023-06-01 PROBLEM — I10 PRIMARY HYPERTENSION: Status: ACTIVE | Noted: 2023-06-01

## 2023-06-01 PROBLEM — I50.21 ACUTE SYSTOLIC HEART FAILURE (H): Status: ACTIVE | Noted: 2023-06-01

## 2023-06-01 LAB
ANION GAP SERPL CALCULATED.3IONS-SCNC: 10 MMOL/L (ref 7–15)
BUN SERPL-MCNC: 19.4 MG/DL (ref 8–23)
CALCIUM SERPL-MCNC: 9.2 MG/DL (ref 8.8–10.2)
CHLORIDE SERPL-SCNC: 96 MMOL/L (ref 98–107)
CREAT SERPL-MCNC: 0.81 MG/DL (ref 0.51–0.95)
DEPRECATED HCO3 PLAS-SCNC: 29 MMOL/L (ref 22–29)
ERYTHROCYTE [DISTWIDTH] IN BLOOD BY AUTOMATED COUNT: 16.6 % (ref 10–15)
GFR SERPL CREATININE-BSD FRML MDRD: 74 ML/MIN/1.73M2
GLUCOSE SERPL-MCNC: 104 MG/DL (ref 70–99)
HCT VFR BLD AUTO: 35.1 % (ref 35–47)
HGB BLD-MCNC: 10.9 G/DL (ref 11.7–15.7)
MCH RBC QN AUTO: 26.5 PG (ref 26.5–33)
MCHC RBC AUTO-ENTMCNC: 31.1 G/DL (ref 31.5–36.5)
MCV RBC AUTO: 85 FL (ref 78–100)
PLATELET # BLD AUTO: 296 10E3/UL (ref 150–450)
POTASSIUM SERPL-SCNC: 4.2 MMOL/L (ref 3.4–5.3)
RBC # BLD AUTO: 4.11 10E6/UL (ref 3.8–5.2)
SODIUM SERPL-SCNC: 135 MMOL/L (ref 136–145)
WBC # BLD AUTO: 11.3 10E3/UL (ref 4–11)

## 2023-06-01 PROCEDURE — 97535 SELF CARE MNGMENT TRAINING: CPT | Mod: GO

## 2023-06-01 PROCEDURE — 250N000013 HC RX MED GY IP 250 OP 250 PS 637: Performed by: INTERNAL MEDICINE

## 2023-06-01 PROCEDURE — 85027 COMPLETE CBC AUTOMATED: CPT | Performed by: INTERNAL MEDICINE

## 2023-06-01 PROCEDURE — 80048 BASIC METABOLIC PNL TOTAL CA: CPT | Performed by: INTERNAL MEDICINE

## 2023-06-01 PROCEDURE — 250N000013 HC RX MED GY IP 250 OP 250 PS 637: Performed by: PHYSICIAN ASSISTANT

## 2023-06-01 PROCEDURE — 99232 SBSQ HOSP IP/OBS MODERATE 35: CPT | Mod: FS | Performed by: PHYSICIAN ASSISTANT

## 2023-06-01 PROCEDURE — 36415 COLL VENOUS BLD VENIPUNCTURE: CPT | Performed by: INTERNAL MEDICINE

## 2023-06-01 PROCEDURE — 99239 HOSP IP/OBS DSCHRG MGMT >30: CPT | Performed by: INTERNAL MEDICINE

## 2023-06-01 RX ORDER — METOPROLOL SUCCINATE 25 MG/1
25 TABLET, EXTENDED RELEASE ORAL 2 TIMES DAILY
DISCHARGE
Start: 2023-06-01 | End: 2023-06-20

## 2023-06-01 RX ORDER — OXYCODONE HYDROCHLORIDE 5 MG/1
5 TABLET ORAL EVERY 6 HOURS PRN
Qty: 12 TABLET | Refills: 0 | Status: SHIPPED | DISCHARGE
Start: 2023-06-01 | End: 2023-06-04

## 2023-06-01 RX ORDER — TORSEMIDE 20 MG/1
20 TABLET ORAL DAILY
DISCHARGE
Start: 2023-06-01 | End: 2023-06-01

## 2023-06-01 RX ORDER — ASPIRIN 81 MG/1
81 TABLET ORAL DAILY
Qty: 5 TABLET | Refills: 0 | DISCHARGE
Start: 2023-06-01 | End: 2023-06-06

## 2023-06-01 RX ORDER — NALOXONE HYDROCHLORIDE 0.4 MG/ML
0.2 INJECTION, SOLUTION INTRAMUSCULAR; INTRAVENOUS; SUBCUTANEOUS
Status: DISCONTINUED | OUTPATIENT
Start: 2023-06-01 | End: 2023-06-01 | Stop reason: HOSPADM

## 2023-06-01 RX ORDER — NALOXONE HYDROCHLORIDE 0.4 MG/ML
0.4 INJECTION, SOLUTION INTRAMUSCULAR; INTRAVENOUS; SUBCUTANEOUS
Status: DISCONTINUED | OUTPATIENT
Start: 2023-06-01 | End: 2023-06-01 | Stop reason: HOSPADM

## 2023-06-01 RX ORDER — TORSEMIDE 10 MG/1
10 TABLET ORAL DAILY
DISCHARGE
Start: 2023-06-02 | End: 2023-09-06

## 2023-06-01 RX ORDER — LOSARTAN POTASSIUM 25 MG/1
25 TABLET ORAL DAILY
Status: DISCONTINUED | OUTPATIENT
Start: 2023-06-01 | End: 2023-06-01

## 2023-06-01 RX ORDER — LOSARTAN POTASSIUM 50 MG/1
50 TABLET ORAL DAILY
DISCHARGE
Start: 2023-06-01 | End: 2023-06-01

## 2023-06-01 RX ORDER — CLOPIDOGREL BISULFATE 75 MG/1
75 TABLET ORAL DAILY
DISCHARGE
Start: 2023-06-01 | End: 2023-11-06

## 2023-06-01 RX ORDER — TORSEMIDE 10 MG/1
10 TABLET ORAL DAILY
Status: DISCONTINUED | OUTPATIENT
Start: 2023-06-02 | End: 2023-06-01 | Stop reason: HOSPADM

## 2023-06-01 RX ORDER — FAMOTIDINE 20 MG/1
20 TABLET, FILM COATED ORAL 2 TIMES DAILY
DISCHARGE
Start: 2023-06-01

## 2023-06-01 RX ORDER — LOSARTAN POTASSIUM 25 MG/1
12.5 TABLET ORAL EVERY EVENING
DISCHARGE
Start: 2023-06-01 | End: 2023-09-06

## 2023-06-01 RX ORDER — LOSARTAN POTASSIUM 25 MG/1
25 TABLET ORAL DAILY
DISCHARGE
Start: 2023-06-01 | End: 2023-06-01

## 2023-06-01 RX ADMIN — METOPROLOL SUCCINATE 25 MG: 25 TABLET, EXTENDED RELEASE ORAL at 08:40

## 2023-06-01 RX ADMIN — ACETAMINOPHEN 650 MG: 325 TABLET ORAL at 09:48

## 2023-06-01 RX ADMIN — POTASSIUM CHLORIDE 40 MEQ: 1500 TABLET, EXTENDED RELEASE ORAL at 08:14

## 2023-06-01 RX ADMIN — APIXABAN 5 MG: 5 TABLET, FILM COATED ORAL at 08:14

## 2023-06-01 RX ADMIN — OXYCODONE HYDROCHLORIDE 5 MG: 5 TABLET ORAL at 06:17

## 2023-06-01 RX ADMIN — LEVOTHYROXINE SODIUM 112 MCG: 0.11 TABLET ORAL at 06:17

## 2023-06-01 RX ADMIN — TORSEMIDE 20 MG: 20 TABLET ORAL at 08:12

## 2023-06-01 RX ADMIN — CLOPIDOGREL BISULFATE 75 MG: 75 TABLET ORAL at 08:14

## 2023-06-01 RX ADMIN — ASPIRIN 81 MG: 81 TABLET, COATED ORAL at 08:15

## 2023-06-01 RX ADMIN — ATORVASTATIN CALCIUM 40 MG: 40 TABLET, FILM COATED ORAL at 08:12

## 2023-06-01 ASSESSMENT — ACTIVITIES OF DAILY LIVING (ADL)
ADLS_ACUITY_SCORE: 44
ADLS_ACUITY_SCORE: 42
ADLS_ACUITY_SCORE: 44
ADLS_ACUITY_SCORE: 44

## 2023-06-01 NOTE — PROGRESS NOTES
"Vitals: BP 95/53 (BP Location: Right arm)   Pulse 87   Temp 97.8  F (36.6  C) (Oral)   Resp 18   Ht 1.676 m (5' 6\")   Wt 129 kg (284 lb 6.4 oz)   SpO2 91%   BMI 45.90 kg/m      Neuro: A&Ox 4  Resp: Clear, RA  Cardiac: WDL  GI: WDL  : Incontinent, purewick  Skin: edema +2/+3 dependent  M/S: Mild generalized weakness  Lines/ Drains: PIV SL  Activity: A1 belt/walker  Nutrition: Cardiac  Pain: 7/10 knee pain gave oxycodone 5mg x 1  Labs: Protocol Potassium, Magnesium, Phosphorus. No replacements, AM lab draw.    Expected Discharge: TBD  "

## 2023-06-01 NOTE — PLAN OF CARE
Physical Therapy Discharge Summary     Reason for therapy discharge:    Discharged to transitional care facility.     Progress towards therapy goal(s). See goals on Care Plan in [facility] electronic health record for goal details.  Goals not met.  Barriers to achieving goals:  discharge to TCU   Therapy recommendation(s):    Continued therapy is recommended.  Rationale/Recommendations:  Pt is below baseline with functional mobility and strength and would benefit from continued PT to progress skills.

## 2023-06-01 NOTE — PROGRESS NOTES
Cardiology Progress Note          Assessment and Plan:     Admit 5/25/23 via EMS after suffering a fall.  Few month hx of progressive weakness, SOB, cough, SAL. Evidence of NEW rapid A-fib, significant hypertension, HFpEF exacerbation.    PMH:  Hypertension, hypothyroidism, obesity          New A-fib with RVR, asymptomatic  -persistent since admit, likely has been in A-fib for some time   -plan is for rate control + AC       -Rate controlled on Metoprolol XR 25 mg BID       -[id number] score is 5, on Eliquis 5 mg BID. Note she is also on ASA and Plavix for recent stenting, see below for recs  -Outpatient sleep evaluation      Acute Systolic HF exacerbation  Etiology likely mixed ischemic and nonischemic with contribution from afib with RVR  -BNP 5500 / pulmonary vascular congestion, small pleural effusions  -Echo 5/26/23: LVEF 25-30% (global), mild RV dysfunction, 1+ MR, RVSP 40 mmhg  -improved symptoms with IV Lasix 80 mg BID. Weight down 336 lbs to 285 lbs.   Now on PO torsemide 20 mg. Weight 284 lbs and BMP stable. Will lower to torsemide 10 mg given her soft BPs  -Currently on Toprol XL 25 mg BID, losartan 50 mg       -BPs soft this AM.  Will lower losartan to 12.5 mg in PM with hold parameters  -CORE outpatient to consider adding Spironolactone, SGLT2 inhibitor, and changing Losartan to Entresto       -Ordered pharmacy price check of Entresto and SGLT2 inhibitors, note in chart       -Hold on switching inpatient with lower BPs this AM  -Outpatient sleep evaluation      Troponin Elevation  CAD  -HS trop 20  -Cardiac cath 5/30/23:  PCI with LONG to D1, LONG to distal left circumflex artery.  Residual high-grade RPL lesion, plan for staged PCI in about 2 to 4 weeks  -No chest pain  -ASA, Plavix, Eliquis x 1 week; then Eliquis and Plavix for 1 year after stenting completed.  Then Eliquis indefinitely for afib.       -Plan to stop ASA at 6/6/23 appt, message sent to CAMMIE  -Atorvastatin 40 mg     Hypertension  -BP  "controlled    Suspected Sleep Apnea  -outpatient sleep evaluation recommended    Hypothyroidism  -TSH 7.02  -Levothyroxine                 DISPO: Plan to discharge to TCU today    Follow up:  CORE enrollment 6/6/23            Interval History:     No orthopnea  SOB improved  Edema improved  Denies palpitations or chest pain                Medications:       apixaban ANTICOAGULANT  5 mg Oral BID     aspirin  81 mg Oral Daily     atorvastatin  40 mg Oral Daily     clopidogrel  75 mg Oral Daily     levothyroxine  112 mcg Oral QAM AC     losartan  25 mg Oral Daily     menthol   Transdermal Q8H     metoprolol succinate ER  25 mg Oral BID     pantoprazole  40 mg Oral QAM AC     potassium chloride  40 mEq Oral Daily     sodium chloride (PF)  3 mL Intracatheter Q8H     torsemide  20 mg Oral Daily            Physical Exam:   Blood pressure 97/55, pulse 89, temperature 97.5  F (36.4  C), temperature source Oral, resp. rate 16, height 1.676 m (5' 6\"), weight 129 kg (284 lb 6.4 oz), SpO2 92 %, not currently breastfeeding.  Wt Readings from Last 3 Encounters:   06/01/23 129 kg (284 lb 6.4 oz)     I/O last 3 completed shifts:  In: 550 [P.O.:550]  Out: 950 [Urine:950]    CONST:  Alert and oriented  NAD  LUNGS:  Clear anterior   CARDIO:  Irreg, Irreg  No murmurs, no JVD  ABD:  Obese, soft  EXT:  1+ shin edema bilaterally. RRA site: 2+ radial pulse, normal Narinder test           Data:   CBC  Recent Labs   Lab 06/01/23  0708 05/31/23  0755   WBC 11.3* 12.5*   HGB 10.9* 11.7    318       BMP  Recent Labs   Lab 06/01/23  0708 05/31/23  0755 05/30/23  0723 05/29/23  0626   * 131* 135* 136   POTASSIUM 4.2 5.6* 4.1 3.8   CHLORIDE 96* 93* 93* 93*   DANIE 9.2 9.3 9.2 9.0   CO2 29 23 33* 33*   BUN 19.4 20.0 20.9 23.4*   CR 0.81 0.69 0.80 0.86   * 102* 110* 105*     Recent Labs   Lab Test 05/26/23  0553   CHOL 115   HDL 61   LDL 38   TRIG 82       TROP  Lab Results   Component Value Date    TROPI <0.012 04/27/2013 "       BNP  Recent Labs   Lab 05/25/23  1026   NTBNPI 5,524*

## 2023-06-01 NOTE — PLAN OF CARE
VSS, afeb., LS are dim,clear, 97% on RA. Good appetite and good fluid intake. Pt up in chair this shift, beltran well. LE edema, 2+ in legs , 3+ in feet. Pt transfers w/ 1-2 and walker. Pt daughter, Luiza updated on POC. Plan to discharge to [facility] in AM.

## 2023-06-01 NOTE — DISCHARGE SUMMARY
[facility] Discharge Summary    Fanny Myers MRN# [id number]   Age: 78 year old YOB: 1945     Date of Admission:  5/25/2023  Date of Discharge::  6/1/2023  Admitting Physician:  Reilly Peralta DO  Discharge Physician:  Balaji Bojorquez MD     Home clinic: [facility], [place]          Admission Diagnoses:   Elevated troponin [R77.8]  Anemia, unspecified type [D64.9]  Atrial fibrillation, unspecified type (H) [I48.91]  Acute congestive heart failure, unspecified heart failure type (H) [I50.9]          Discharge Diagnosis:   Principal Problem:    Acute systolic heart failure, due to tachyarrhythmia and ischemic CMO  Active Problems:    Paroxysmal atrial fibrillation (H)    Type 2 MI (myocardial infarction) (H)    Status post insertion of drug eluting coronary artery stents (to D1 and distal L Cx)    Primary hypertension    JOANNE (obstructive sleep apnea), suspected            Procedures:   CXR  Echocardiogram  Coronary angiogram with PCI and LONG deployment x 2          Medications Prior to Admission:     Medications Prior to Admission   Medication Sig Dispense Refill Last Dose     levothyroxine (SYNTHROID/LEVOTHROID) 112 MCG tablet Take 112 mcg by mouth every morning (before breakfast)   5/25/2023 at am     losartan (COZAAR) 25 MG tablet Take 25 mg by mouth 2 times daily   5/25/2023 at am             Discharge Medications:     Discharge Medication List as of 6/1/2023 10:12 AM      START taking these medications    Details   apixaban ANTICOAGULANT (ELIQUIS) 5 MG tablet Take 1 tablet (5 mg) by mouth 2 times daily, Transitional      atorvastatin (LIPITOR) 40 MG tablet Take 1 tablet (40 mg) by mouth daily, Disp-90 tablet, R-3, Local Print      clopidogrel (PLAVIX) 75 MG tablet Take 1 tablet (75 mg) by mouth daily, Transitional      famotidine (PEPCID) 20 MG tablet Take 1 tablet (20 mg) by mouth 2 times daily, Transitional      metoprolol succinate ER (TOPROL XL) 25 MG 24 hr tablet Take 1  "tablet (25 mg) by mouth 2 times daily, Transitional      oxyCODONE (ROXICODONE) 5 MG tablet Take 1 tablet (5 mg) by mouth every 6 hours as needed for pain, Disp-12 tablet, R-0, Transitional      aspirin 81 MG EC tablet Take 1 tablet (81 mg) by mouth daily Start tomorrow., Disp-30 tablet, R-3, Local Print         CONTINUE these medications which have CHANGED    Details   losartan (COZAAR) 25 MG tablet Take 0.5 tablets (12.5 mg) by mouth every evening, Transitional      torsemide (DEMADEX) 10 MG tablet Take 1 tablet (10 mg) by mouth daily, Transitional         CONTINUE these medications which have NOT CHANGED    Details   levothyroxine (SYNTHROID/LEVOTHROID) 112 MCG tablet Take 112 mcg by mouth every morning (before breakfast), Historical                   Consultations:   Consultation during this admission received from cardiology         Hospital Course:   Fanny Myers is a  78 year old female with PMHx significant for HTN, hypothyroidism and obesity, who was admitted on 5/25/2023 with sob and a fib with RVR. She notes sx started after a URI this spring that she suspects was RSV.  She presented for concerns about generalized weakness, SOB, and LE edema     Work up included TTE showing a new dx of CM with EF near 25%.  She is being treated with IV Lasix for diuresis with significant diuresis this admission; weight (kg) 155 -> 135     Cardiac cath completed on 5/30 showed diffuse advanced disease with multiple areas of high grade stenosis.  She underwent stenting of 1st diag and distal circ lesions. There was also a very distal RCA lesion (right posterior atrioventricular artery) that remains at 75% and still be intervened on later as an outpatient.    Ms. Myers was managed under the guidance of Cardiology who recommended only a week of \"triple therapy\" (DOAC for A fib and DAPT for stenting) with a plan drop the aspirin after a week.     BP 97/55 (BP Location: Right arm)   Pulse 89   Temp 97.5  F (36.4  C) " "(Oral)   Resp 16   Ht 1.676 m (5' 6\")   Wt 129 kg (284 lb 6.4 oz)   SpO2 92%   BMI 45.90 kg/m    Ms. Myers was alert and coherent at the time of discharge.  HEENT: no facial muscular asymmetry  COR: IRRIR  Lungs: CTA with scant rales at the bases.  Abd: Soft, NTND  Extrem:  Ankle edema is mild         Discharge Instructions and Follow-Up:   Discharge diet: Cardiac   Discharge activity: Advance per cardiac rehab   Discharge follow-up: With Cardiology in the next several weeks.    With NH physician as needed.            Discharge Disposition:   Discharged to short-term care facility      Attestation:  I have reviewed today's vital signs, notes, medications, labs and imaging.    Balaji Bojorquez MD     "

## 2023-06-01 NOTE — PLAN OF CARE
"Pt. A&O, ONE assist for mobility with walker, gait belt, pt. Denied to removed pure-wick, denied to get up, steel staying in bed, ate 50%, of her meal, voiding adequately, has some pain in her BLE, got PRN Tylenol, has edema in all extremities.      BP 97/50 (BP Location: Right arm, Patient Position: Semi-Navarro's, Cuff Size: Adult Large)   Pulse 84   Temp 97.5  F (36.4  C) (Oral)   Resp 18   Ht 1.676 m (5' 6\")   Wt 129 kg (284 lb 6.4 oz)   SpO2 92%   BMI 45.90 kg/m     "

## 2023-06-01 NOTE — PHARMACY-CONSULT NOTE
Discharge medication review for this patient is complete.   Patient was not counseled or given any education materials as discharged to TCU facility.    Pharmacist assisted with medication reconciliation of discharge medications with PTA medications.    MD was contacted with any questions/concerns: Yes - Messaged MD about aspirin duration on AVS. Per cardiology recommendations, she should take triple therapy (aspirin, Plavix, Eliquis) for 1 week (stop at 6/6/23 appt), then take Eliquis & Plavix for 1 year after stenting followed by Eliquis monotherapy indefinitely.     Additional medication history information:None    Discharge Medication List     Review of your medicines      START taking      Dose / Directions   apixaban ANTICOAGULANT 5 MG tablet  Commonly known as: ELIQUIS  Indication: Atrial Fibrillation Not Caused By A Heart Valve Problem      Dose: 5 mg  Take 1 tablet (5 mg) by mouth 2 times daily  Refills: 0     aspirin 81 MG EC tablet      Dose: 81 mg  Take 1 tablet (81 mg) by mouth daily Start tomorrow.  Quantity: 30 tablet  Refills: 3     atorvastatin 40 MG tablet  Commonly known as: LIPITOR      Dose: 40 mg  Take 1 tablet (40 mg) by mouth daily  Quantity: 90 tablet  Refills: 3     clopidogrel 75 MG tablet  Commonly known as: PLAVIX  Indication: Procedure to Reestablish Blood Supply to the Heart      Dose: 75 mg  Take 1 tablet (75 mg) by mouth daily  Refills: 0     famotidine 20 MG tablet  Commonly known as: PEPCID  Indication: Gastroesophageal Reflux Disease  Used for: Dyspepsia      Dose: 20 mg  Take 1 tablet (20 mg) by mouth 2 times daily  Refills: 0     metoprolol succinate ER 25 MG 24 hr tablet  Commonly known as: TOPROL XL  Indication: Atrial Fibrillation, Cardiac Failure      Dose: 25 mg  Take 1 tablet (25 mg) by mouth 2 times daily  Refills: 0     oxyCODONE 5 MG tablet  Commonly known as: ROXICODONE  Indication: Acute Pain, knee injury  Used for: Acute pain of both knees      Dose: 5 mg  Take 1  tablet (5 mg) by mouth every 6 hours as needed for pain  Quantity: 12 tablet  Refills: 0     torsemide 10 MG tablet  Commonly known as: DEMADEX  Indication: Cardiac Failure      Dose: 10 mg  Start taking on: June 2, 2023  Take 1 tablet (10 mg) by mouth daily  Refills: 0        CONTINUE these medicines which may have CHANGED, or have new prescriptions. If we are uncertain of the size of tablets/capsules you have at home, strength may be listed as something that might have changed.      Dose / Directions   losartan 25 MG tablet  Commonly known as: COZAAR  Indication: Left Systolic Heart Failure  This may have changed:     how much to take    when to take this      Dose: 12.5 mg  Take 0.5 tablets (12.5 mg) by mouth every evening  Refills: 0        CONTINUE these medicines which have NOT CHANGED      Dose / Directions   levothyroxine 112 MCG tablet  Commonly known as: SYNTHROID/LEVOTHROID  Indication: Underactive Thyroid      Dose: 112 mcg  Take 112 mcg by mouth every morning (before breakfast)  Refills: 0           Where to get your medicines      Some of these will need a paper prescription and others can be bought over the counter. Ask your nurse if you have questions.    Bring a paper prescription for each of these medications    aspirin 81 MG EC tablet    atorvastatin 40 MG tablet     Information about where to get these medications is not yet available    Ask your nurse or doctor about these medications    oxyCODONE 5 MG tablet

## 2023-06-01 NOTE — PLAN OF CARE
Occupational Therapy Discharge Summary    Reason for therapy discharge:    Discharged to transitional care facility.    Progress towards therapy goal(s). See goals on Care Plan in [facility] electronic health record for goal details.  Goals not met.  Barriers to achieving goals:   discharge from facility.    Therapy recommendation(s):    Continued therapy is recommended.  Rationale/Recommendations:  Recommendation is to continue OT services at TCU to address ADLs, IADLs and mobility.

## 2023-06-02 ENCOUNTER — LAB REQUISITION (OUTPATIENT)
Dept: LAB | Facility: CLINIC | Age: 78
End: 2023-06-02
Payer: COMMERCIAL

## 2023-06-02 ENCOUNTER — PATIENT OUTREACH (OUTPATIENT)
Dept: CARE COORDINATION | Facility: CLINIC | Age: 78
End: 2023-06-02
Payer: COMMERCIAL

## 2023-06-02 DIAGNOSIS — I25.10 ATHEROSCLEROTIC HEART DISEASE OF NATIVE CORONARY ARTERY WITHOUT ANGINA PECTORIS: ICD-10-CM

## 2023-06-02 DIAGNOSIS — I50.23 ACUTE ON CHRONIC SYSTOLIC (CONGESTIVE) HEART FAILURE (H): ICD-10-CM

## 2023-06-02 DIAGNOSIS — I48.91 UNSPECIFIED ATRIAL FIBRILLATION (H): ICD-10-CM

## 2023-06-02 DIAGNOSIS — R00.0 TACHYCARDIA, UNSPECIFIED: ICD-10-CM

## 2023-06-02 DIAGNOSIS — I10 ESSENTIAL (PRIMARY) HYPERTENSION: ICD-10-CM

## 2023-06-02 DIAGNOSIS — I51.7 CARDIOMEGALY: ICD-10-CM

## 2023-06-02 PROCEDURE — P9604 ONE-WAY ALLOW PRORATED TRIP: HCPCS | Mod: ORL | Performed by: INTERNAL MEDICINE

## 2023-06-02 PROCEDURE — 36415 COLL VENOUS BLD VENIPUNCTURE: CPT | Mod: ORL | Performed by: INTERNAL MEDICINE

## 2023-06-02 PROCEDURE — 86481 TB AG RESPONSE T-CELL SUSP: CPT | Mod: ORL | Performed by: INTERNAL MEDICINE

## 2023-06-02 NOTE — PROGRESS NOTES
[facility] Care Resource [place]    Background: Transitional Care Management program identified per system criteria and reviewed by [facility] team for possible outreach.    Assessment: Upon chart review, CCRC Team member will not proceed with patient outreach related to this episode of Transitional Care Management program due to reason below:    Non-[facility] TCU: CCRC team member noted patient discharged to TCU/ARU/LTACH. Patient is not established with a [facility] currently supported by Primary Care-Care Coordination therefore handoff to Primary Care-Care Coordination is not appropriate at this time.    Plan: Transitional Care Management episode addressed appropriately per reason noted above.      Deanna Kelley RN  [facility], [facility]    *Connected Care Resource Team does NOT follow patient ongoing. Referrals are identified based on internal discharge reports and the outreach is to ensure patient has an understanding of their discharge instructions.

## 2023-06-04 LAB
QUANTIFERON MITOGEN: 0.84 IU/ML
QUANTIFERON NIL TUBE: 0.02 IU/ML
QUANTIFERON TB1 TUBE: 0.04 IU/ML

## 2023-06-05 LAB
ANION GAP SERPL CALCULATED.3IONS-SCNC: 11 MMOL/L (ref 7–15)
BUN SERPL-MCNC: 18.3 MG/DL (ref 8–23)
CALCIUM SERPL-MCNC: 9.3 MG/DL (ref 8.8–10.2)
CHLORIDE SERPL-SCNC: 100 MMOL/L (ref 98–107)
CREAT SERPL-MCNC: 0.94 MG/DL (ref 0.51–0.95)
DEPRECATED HCO3 PLAS-SCNC: 23 MMOL/L (ref 22–29)
GAMMA INTERFERON BACKGROUND BLD IA-ACNC: 0.02 IU/ML
GFR SERPL CREATININE-BSD FRML MDRD: 62 ML/MIN/1.73M2
GLUCOSE SERPL-MCNC: 90 MG/DL (ref 70–99)
M TB IFN-G BLD-IMP: NEGATIVE
M TB IFN-G CD4+ BCKGRND COR BLD-ACNC: 0.82 IU/ML
MITOGEN IGNF BCKGRD COR BLD-ACNC: -0.01 IU/ML
MITOGEN IGNF BCKGRD COR BLD-ACNC: 0.02 IU/ML
NT-PROBNP SERPL-MCNC: 2544 PG/ML (ref 0–1800)
POTASSIUM SERPL-SCNC: 4.1 MMOL/L (ref 3.4–5.3)
QUANTIFERON TB2 TUBE: 0.01
SODIUM SERPL-SCNC: 134 MMOL/L (ref 136–145)

## 2023-06-05 PROCEDURE — 36415 COLL VENOUS BLD VENIPUNCTURE: CPT | Mod: ORL | Performed by: INTERNAL MEDICINE

## 2023-06-05 PROCEDURE — 80048 BASIC METABOLIC PNL TOTAL CA: CPT | Mod: ORL | Performed by: INTERNAL MEDICINE

## 2023-06-05 PROCEDURE — 83880 ASSAY OF NATRIURETIC PEPTIDE: CPT | Mod: ORL | Performed by: INTERNAL MEDICINE

## 2023-06-05 PROCEDURE — P9603 ONE-WAY ALLOW PRORATED MILES: HCPCS | Mod: ORL | Performed by: INTERNAL MEDICINE

## 2023-06-06 ENCOUNTER — OFFICE VISIT (OUTPATIENT)
Dept: CARDIOLOGY | Facility: CLINIC | Age: 78
End: 2023-06-06
Payer: COMMERCIAL

## 2023-06-06 ENCOUNTER — LAB (OUTPATIENT)
Dept: LAB | Facility: CLINIC | Age: 78
End: 2023-06-06
Payer: COMMERCIAL

## 2023-06-06 VITALS
SYSTOLIC BLOOD PRESSURE: 122 MMHG | DIASTOLIC BLOOD PRESSURE: 72 MMHG | WEIGHT: 269.5 LBS | OXYGEN SATURATION: 96 % | BODY MASS INDEX: 44.9 KG/M2 | HEART RATE: 94 BPM | HEIGHT: 65 IN

## 2023-06-06 DIAGNOSIS — E78.5 HYPERLIPIDEMIA LDL GOAL <70: ICD-10-CM

## 2023-06-06 DIAGNOSIS — I50.23 ACUTE ON CHRONIC SYSTOLIC HEART FAILURE (H): ICD-10-CM

## 2023-06-06 DIAGNOSIS — I25.118 CORONARY ARTERY DISEASE OF NATIVE ARTERY OF NATIVE HEART WITH STABLE ANGINA PECTORIS (H): Primary | ICD-10-CM

## 2023-06-06 DIAGNOSIS — I42.8 OTHER CARDIOMYOPATHY (H): ICD-10-CM

## 2023-06-06 DIAGNOSIS — I10 BENIGN ESSENTIAL HYPERTENSION: ICD-10-CM

## 2023-06-06 DIAGNOSIS — G47.33 OSA (OBSTRUCTIVE SLEEP APNEA): ICD-10-CM

## 2023-06-06 LAB
ANION GAP SERPL CALCULATED.3IONS-SCNC: 11 MMOL/L (ref 7–15)
BUN SERPL-MCNC: 16.6 MG/DL (ref 8–23)
CALCIUM SERPL-MCNC: 9.6 MG/DL (ref 8.8–10.2)
CHLORIDE SERPL-SCNC: 100 MMOL/L (ref 98–107)
CREAT SERPL-MCNC: 0.95 MG/DL (ref 0.51–0.95)
DEPRECATED HCO3 PLAS-SCNC: 25 MMOL/L (ref 22–29)
GFR SERPL CREATININE-BSD FRML MDRD: 61 ML/MIN/1.73M2
GLUCOSE SERPL-MCNC: 118 MG/DL (ref 70–99)
NT-PROBNP SERPL-MCNC: 2484 PG/ML (ref 0–1800)
POTASSIUM SERPL-SCNC: 4.4 MMOL/L (ref 3.4–5.3)
SODIUM SERPL-SCNC: 136 MMOL/L (ref 136–145)

## 2023-06-06 PROCEDURE — 36415 COLL VENOUS BLD VENIPUNCTURE: CPT | Performed by: NURSE PRACTITIONER

## 2023-06-06 PROCEDURE — 83880 ASSAY OF NATRIURETIC PEPTIDE: CPT | Performed by: NURSE PRACTITIONER

## 2023-06-06 PROCEDURE — 99204 OFFICE O/P NEW MOD 45 MIN: CPT | Performed by: PHYSICIAN ASSISTANT

## 2023-06-06 PROCEDURE — 80048 BASIC METABOLIC PNL TOTAL CA: CPT | Performed by: NURSE PRACTITIONER

## 2023-06-06 NOTE — PATIENT INSTRUCTIONS
Call CORE nurse for any questions or concerns Mon-Fri 8am-4pm:                                                 #[phone]                                       For concerns after hours:                                               #[phone]      Medication changes:   -- Stop aspirin    Plan from today:   -- Schedule heart procedure to be done in the next few weeks   -- Stop Eliquis 2 days before your angiogram and take full dose aspirin 325 mg on the day of the procedure   -- Schedule heart monitor (24 hours) in the next few weeks to check your rhythm and pulse.   -- Follow up in 4-6 weeks with repeat blood work (no need to fast).     Lab results:  Component      Latest Ref Rng 6/5/2023  5:45 AM 6/6/2023  12:25 PM   Sodium      136 - 145 mmol/L 134 (L)  136    Potassium      3.4 - 5.3 mmol/L 4.1  4.4    Chloride      98 - 107 mmol/L 100  100    Carbon Dioxide (CO2)      22 - 29 mmol/L 23  25    Anion Gap      7 - 15 mmol/L 11  11    Urea Nitrogen      8.0 - 23.0 mg/dL 18.3  16.6    Creatinine      0.51 - 0.95 mg/dL 0.94  0.95    Calcium      8.8 - 10.2 mg/dL 9.3  9.6    Glucose      70 - 99 mg/dL 90  118 (H)    GFR Estimate      >60 mL/min/1.73m2 62  61    N-Terminal Pro Bnp      0 - 1,800 pg/mL 2,544 (H)  2,484 (H)       Legend:  (L) Low  (H) High

## 2023-06-06 NOTE — PROGRESS NOTES
Primary Cardiologist: Dr. Craig    Reason for Visit: CORE enrollment/Hospital follow up/Arrange staged PCI/Discuss stopping aspirin to avoid triple therapy    History of Present Illness:   Fanny is a pleasant 78 year old female with past medical history notable for:     # Acute HFrEF, LVEF 25-30%, mild RV dysfunction, mild PH. Etiology likely mixed ischemic and non-ischemic with contribution from atrial fibrillation with RVR.   # CAD, she recently underwent PCI with LONG to D1, LONG to distal left circumflex artery.  Residual high-grade RPL lesion, plan for staged PCI in about 2 to 4 weeks  # Paroxysmal atrial fibrillation with RVR, asymptomatic, new diagnosis  # HTN  # Suspected JOANNE  # Hypothyroidism      She returns to clinic today, stating she is doing well.  She tells me that she has never had significant symptoms even prior to her admission.  She had a fall and also noticed that she was having peripheral edema but that was about it.  She denies chest discomfort or shortness of breath.  She feels like her peripheral edema has improved significantly.  She denies any bleeding issues while being on triple therapy.  She has no palpitations, orthopnea, PND, or weight gain.  She is currently residing at [facility] and hoping to move to her assisted living facility in the next 1 to 2 days.    Assessment and Plan:  Fanny is a pleasant 78 year old female with past medical history notable for:     # Acute HFrEF, LVEF 25-30%, mild RV dysfunction, mild PH. Etiology likely mixed ischemic and non-ischemic with contribution from atrial fibrillation with RVR.   # CAD, she recently underwent PCI with LONG to D1, LONG to distal left circumflex artery.  Residual high-grade RPL lesion, plan for staged PCI in about 2 to 4 weeks  # Paroxysmal atrial fibrillation with RVR, asymptomatic, new diagnosis  # HTN  # Suspected JOANNE  # Hypothyroidism      We have reviewed her recent admission in great detail today.  I have informed Fanny that she  would benefit from staged PCI in the next few weeks.  We will go ahead and schedule her for this.  I once again reviewed the risk and benefits of elective coronary angiogram with her which included MI, death, bleeding complications, anesthesia related complications, contrast dye nephropathy, and contrast dye induced anaphylactic reaction.  She verbalized understanding.  She is full code.  She denies any bleeding issues with triple therapy.  At this time we will also have her stop aspirin and continue with Plavix and Eliquis.  Prior to her angiogram she will hold Eliquis for 2 full days and take full dose of aspirin on the day of the procedure.  In regard to her significant decline in LVEF we will repeat imaging down the road to see if this improves after PCI as well as better control of her atrial fibrillation.  We will also work on introducing cardiomyopathy medications but first I will check on cost coverage for SGLT2 inhibitors as well as Entresto.  I will see her back in about 4 weeks for follow-up with repeat BMP.  Her recent BMP showed normal renal function and electrolytes.  I also reviewed her weight log from her TCU which showed stable weights.  She denies any symptoms to suggest hypervolemia.  Her legs are wrapped but she tells me her peripheral edema has improved significantly.  We will also have her obtain 24-hour Holter monitor to assess her A-fib and ventricular rate burden.  Depending on the results we may need to further increase her metoprolol.    50 minutes spent on the date of the encounter with chart review, patient visit, care coordination, and documentation.      This note was completed in part using Dragon voice recognition software. Although reviewed after completion, some word and grammatical errors may occur.    Orders this Visit:  Orders Placed This Encounter   Procedures     Basic metabolic panel     Follow-Up with Cardiology CAMMIE     Adult Holter Monitor 24 hour     Case Request Cath Lab:  Coronary Angiogram     No orders of the defined types were placed in this encounter.    There are no discontinued medications.      Encounter Diagnoses   Name Primary?     Acute on chronic systolic heart failure (H)      Other cardiomyopathy (H)      Coronary artery disease of native artery of native heart with stable angina pectoris (H) Yes     Hyperlipidemia LDL goal <70      Benign essential hypertension      JOANNE (obstructive sleep apnea)        CURRENT MEDICATIONS:  Current Outpatient Medications   Medication Sig Dispense Refill     apixaban ANTICOAGULANT (ELIQUIS) 5 MG tablet Take 1 tablet (5 mg) by mouth 2 times daily       aspirin 81 MG EC tablet Take 1 tablet (81 mg) by mouth daily for 5 days Start tomorrow. 5 tablet 0     atorvastatin (LIPITOR) 40 MG tablet Take 1 tablet (40 mg) by mouth daily 90 tablet 3     clopidogrel (PLAVIX) 75 MG tablet Take 1 tablet (75 mg) by mouth daily       famotidine (PEPCID) 20 MG tablet Take 1 tablet (20 mg) by mouth 2 times daily       levothyroxine (SYNTHROID/LEVOTHROID) 112 MCG tablet Take 112 mcg by mouth every morning (before breakfast)       losartan (COZAAR) 25 MG tablet Take 0.5 tablets (12.5 mg) by mouth every evening       metoprolol succinate ER (TOPROL XL) 25 MG 24 hr tablet Take 1 tablet (25 mg) by mouth 2 times daily       torsemide (DEMADEX) 10 MG tablet Take 1 tablet (10 mg) by mouth daily         ALLERGIES     Allergies   Allergen Reactions     Codeine Sulfate      Penicillins        PAST MEDICAL HISTORY:  History reviewed. No pertinent past medical history.    PAST SURGICAL HISTORY:  Past Surgical History:   Procedure Laterality Date     APPENDECTOMY       CV CORONARY ANGIOGRAM N/A 5/30/2023    Procedure: Coronary Angiogram;  Surgeon: Bryant Simental MD;  Location: [facility] CARDIAC CATH LAB     CV PCI N/A 5/30/2023    Procedure: Percutaneous Coronary Intervention;  Surgeon: Bryant Simental MD;  Location: [facility] CARDIAC CATH LAB     ENT SURGERY       GYN  "SURGERY         FAMILY HISTORY:  Family History   Problem Relation Age of Onset     Heart Disease Mother         Per pt something heart related- unsure what     Heart Disease Father         Per pt something heart related- unsure what       SOCIAL HISTORY:  Social History     Socioeconomic History     Marital status:      Spouse name: None     Number of children: None     Years of education: None     Highest education level: None   Tobacco Use     Smoking status: Never   Substance and Sexual Activity     Alcohol use: No     Drug use: No       Review of Systems:  Skin:        Eyes:       ENT:       Respiratory:       Cardiovascular:       Gastroenterology:      Genitourinary:       Musculoskeletal:       Neurologic:       Psychiatric:       Heme/Lymph/Imm:       Endocrine:         Physical Exam:  Vitals: /72 (BP Location: Right arm, Patient Position: Sitting, Cuff Size: Adult Large)   Pulse 94   Ht 1.651 m (5' 5\")   Wt 122.2 kg (269 lb 8 oz)   SpO2 96%   BMI 44.85 kg/m       GEN:  NAD  NECK: No JVD  C/V:  Regular rate and rhythm, no murmur, rub or gallop.  RESP: Clear to auscultation bilaterally without wheezing, rales, or rhonchi.  GI: Abdomen soft, nontender, nondistended.   EXTREM: Trace pitting LE edema above the knees.   NEURO: Alert and oriented, cooperative. No obvious focal deficits.   PSYCH: Normal affect.  SKIN: Warm and dry.       Recent Lab Results:  LIPID RESULTS:  Lab Results   Component Value Date    CHOL 135 05/30/2023    HDL 70 05/30/2023    LDL 47 05/30/2023    TRIG 91 05/30/2023       LIVER ENZYME RESULTS:  Lab Results   Component Value Date    AST 33 05/25/2023    ALT 26 05/25/2023       CBC RESULTS:  Lab Results   Component Value Date    WBC 11.3 (H) 06/01/2023    WBC 14.2 (H) 04/27/2013    RBC 4.11 06/01/2023    RBC 4.46 04/27/2013    HGB 10.9 (L) 06/01/2023    HGB 14.0 04/27/2013    HCT 35.1 06/01/2023    HCT 42.1 04/27/2013    MCV 85 06/01/2023    MCV 94 04/27/2013    MCH 26.5 " 06/01/2023    MCH 31.4 04/27/2013    MCHC 31.1 (L) 06/01/2023    MCHC 33.3 04/27/2013    RDW 16.6 (H) 06/01/2023    RDW 12.7 04/27/2013     06/01/2023     04/27/2013       BMP RESULTS:  Lab Results   Component Value Date     06/06/2023     04/27/2013    POTASSIUM 4.4 06/06/2023    POTASSIUM 4.5 04/27/2013    CHLORIDE 100 06/06/2023    CHLORIDE 101 04/27/2013    CO2 25 06/06/2023    CO2 29 04/27/2013    ANIONGAP 11 06/06/2023    ANIONGAP 10.2 04/27/2013     (H) 06/06/2023     (H) 04/27/2013    BUN 16.6 06/06/2023    BUN 19 04/27/2013    CR 0.95 06/06/2023    CR 1.21 (H) 04/27/2013    GFRESTIMATED 61 06/06/2023    GFRESTIMATED 44 (L) 04/27/2013    GFRESTBLACK 54 (L) 04/27/2013    DANIE 9.6 06/06/2023    DANIE 9.5 04/27/2013        A1C RESULTS:  No results found for: A1C    INR RESULTS:  No results found for: INR        Maura Galeana PA-C, MARY   June 6, 2023

## 2023-06-06 NOTE — LETTER
6/6/2023    [facility]  [address] [address]    RE: Fanny PICKERING Louis       Dear Colleague,     I had the pleasure of seeing Fanny Myers in the [facility].  Primary Cardiologist: Dr. Craig    Reason for Visit: CORE enrollment/Hospital follow up/Arrange staged PCI/Discuss stopping aspirin to avoid triple therapy    History of Present Illness:   Fanny is a pleasant 78 year old female with past medical history notable for:     # Acute HFrEF, LVEF 25-30%, mild RV dysfunction, mild PH. Etiology likely mixed ischemic and non-ischemic with contribution from atrial fibrillation with RVR.   # CAD, she recently underwent PCI with LONG to D1, LONG to distal left circumflex artery.  Residual high-grade RPL lesion, plan for staged PCI in about 2 to 4 weeks  # Paroxysmal atrial fibrillation with RVR, asymptomatic, new diagnosis  # HTN  # Suspected JOANNE  # Hypothyroidism      She returns to clinic today, stating she is doing well.  She tells me that she has never had significant symptoms even prior to her admission.  She had a fall and also noticed that she was having peripheral edema but that was about it.  She denies chest discomfort or shortness of breath.  She feels like her peripheral edema has improved significantly.  She denies any bleeding issues while being on triple therapy.  She has no palpitations, orthopnea, PND, or weight gain.  She is currently residing at [facility] and hoping to move to her assisted living facility in the next 1 to 2 days.    Assessment and Plan:  Fanny is a pleasant 78 year old female with past medical history notable for:     # Acute HFrEF, LVEF 25-30%, mild RV dysfunction, mild PH. Etiology likely mixed ischemic and non-ischemic with contribution from atrial fibrillation with RVR.   # CAD, she recently underwent PCI with LONG to D1, LONG to distal left circumflex artery.  Residual high-grade RPL lesion, plan for staged PCI in about 2 to 4 weeks  #  Paroxysmal atrial fibrillation with RVR, asymptomatic, new diagnosis  # HTN  # Suspected JOANNE  # Hypothyroidism      We have reviewed her recent admission in great detail today.  I have informed Fanny that she would benefit from staged PCI in the next few weeks.  We will go ahead and schedule her for this.  I once again reviewed the risk and benefits of elective coronary angiogram with her which included MI, death, bleeding complications, anesthesia related complications, contrast dye nephropathy, and contrast dye induced anaphylactic reaction.  She verbalized understanding.  She is full code.  She denies any bleeding issues with triple therapy.  At this time we will also have her stop aspirin and continue with Plavix and Eliquis.  Prior to her angiogram she will hold Eliquis for 2 full days and take full dose of aspirin on the day of the procedure.  In regard to her significant decline in LVEF we will repeat imaging down the road to see if this improves after PCI as well as better control of her atrial fibrillation.  We will also work on introducing cardiomyopathy medications but first I will check on cost coverage for SGLT2 inhibitors as well as Entresto.  I will see her back in about 4 weeks for follow-up with repeat BMP.  Her recent BMP showed normal renal function and electrolytes.  I also reviewed her weight log from her TCU which showed stable weights.  She denies any symptoms to suggest hypervolemia.  Her legs are wrapped but she tells me her peripheral edema has improved significantly.  We will also have her obtain 24-hour Holter monitor to assess her A-fib and ventricular rate burden.  Depending on the results we may need to further increase her metoprolol.    50 minutes spent on the date of the encounter with chart review, patient visit, care coordination, and documentation.      This note was completed in part using Dragon voice recognition software. Although reviewed after completion, some word and  grammatical errors may occur.    Orders this Visit:  Orders Placed This Encounter   Procedures    Basic metabolic panel    Follow-Up with Cardiology CAMMIE    Adult Holter Monitor 24 hour    Case Request Cath Lab: Coronary Angiogram     No orders of the defined types were placed in this encounter.    There are no discontinued medications.      Encounter Diagnoses   Name Primary?    Acute on chronic systolic heart failure (H)     Other cardiomyopathy (H)     Coronary artery disease of native artery of native heart with stable angina pectoris (H) Yes    Hyperlipidemia LDL goal <70     Benign essential hypertension     JOANNE (obstructive sleep apnea)        CURRENT MEDICATIONS:  Current Outpatient Medications   Medication Sig Dispense Refill    apixaban ANTICOAGULANT (ELIQUIS) 5 MG tablet Take 1 tablet (5 mg) by mouth 2 times daily      aspirin 81 MG EC tablet Take 1 tablet (81 mg) by mouth daily for 5 days Start tomorrow. 5 tablet 0    atorvastatin (LIPITOR) 40 MG tablet Take 1 tablet (40 mg) by mouth daily 90 tablet 3    clopidogrel (PLAVIX) 75 MG tablet Take 1 tablet (75 mg) by mouth daily      famotidine (PEPCID) 20 MG tablet Take 1 tablet (20 mg) by mouth 2 times daily      levothyroxine (SYNTHROID/LEVOTHROID) 112 MCG tablet Take 112 mcg by mouth every morning (before breakfast)      losartan (COZAAR) 25 MG tablet Take 0.5 tablets (12.5 mg) by mouth every evening      metoprolol succinate ER (TOPROL XL) 25 MG 24 hr tablet Take 1 tablet (25 mg) by mouth 2 times daily      torsemide (DEMADEX) 10 MG tablet Take 1 tablet (10 mg) by mouth daily         ALLERGIES     Allergies   Allergen Reactions    Codeine Sulfate     Penicillins        PAST MEDICAL HISTORY:  History reviewed. No pertinent past medical history.    PAST SURGICAL HISTORY:  Past Surgical History:   Procedure Laterality Date    APPENDECTOMY      CV CORONARY ANGIOGRAM N/A 5/30/2023    Procedure: Coronary Angiogram;  Surgeon: Bryant Simental MD;  Location:   "[facility] CARDIAC CATH LAB    CV PCI N/A 5/30/2023    Procedure: Percutaneous Coronary Intervention;  Surgeon: Bryant Simental MD;  Location: [facility]    ENT SURGERY      GYN SURGERY         FAMILY HISTORY:  Family History   Problem Relation Age of Onset    Heart Disease Mother         Per pt something heart related- unsure what    Heart Disease Father         Per pt something heart related- unsure what       SOCIAL HISTORY:  Social History     Socioeconomic History    Marital status:      Spouse name: None    Number of children: None    Years of education: None    Highest education level: None   Tobacco Use    Smoking status: Never   Substance and Sexual Activity    Alcohol use: No    Drug use: No       Review of Systems:  Skin:        Eyes:       ENT:       Respiratory:       Cardiovascular:       Gastroenterology:      Genitourinary:       Musculoskeletal:       Neurologic:       Psychiatric:       Heme/Lymph/Imm:       Endocrine:         Physical Exam:  Vitals: /72 (BP Location: Right arm, Patient Position: Sitting, Cuff Size: Adult Large)   Pulse 94   Ht 1.651 m (5' 5\")   Wt 122.2 kg (269 lb 8 oz)   SpO2 96%   BMI 44.85 kg/m       GEN:  NAD  NECK: No JVD  C/V:  Regular rate and rhythm, no murmur, rub or gallop.  RESP: Clear to auscultation bilaterally without wheezing, rales, or rhonchi.  GI: Abdomen soft, nontender, nondistended.   EXTREM: Trace pitting LE edema above the knees.   NEURO: Alert and oriented, cooperative. No obvious focal deficits.   PSYCH: Normal affect.  SKIN: Warm and dry.       Recent Lab Results:  LIPID RESULTS:  Lab Results   Component Value Date    CHOL 135 05/30/2023    HDL 70 05/30/2023    LDL 47 05/30/2023    TRIG 91 05/30/2023       LIVER ENZYME RESULTS:  Lab Results   Component Value Date    AST 33 05/25/2023    ALT 26 05/25/2023       CBC RESULTS:  Lab Results   Component Value Date    WBC 11.3 (H) 06/01/2023    WBC 14.2 (H) 04/27/2013    RBC 4.11 " 06/01/2023    RBC 4.46 04/27/2013    HGB 10.9 (L) 06/01/2023    HGB 14.0 04/27/2013    HCT 35.1 06/01/2023    HCT 42.1 04/27/2013    MCV 85 06/01/2023    MCV 94 04/27/2013    MCH 26.5 06/01/2023    MCH 31.4 04/27/2013    MCHC 31.1 (L) 06/01/2023    MCHC 33.3 04/27/2013    RDW 16.6 (H) 06/01/2023    RDW 12.7 04/27/2013     06/01/2023     04/27/2013       BMP RESULTS:  Lab Results   Component Value Date     06/06/2023     04/27/2013    POTASSIUM 4.4 06/06/2023    POTASSIUM 4.5 04/27/2013    CHLORIDE 100 06/06/2023    CHLORIDE 101 04/27/2013    CO2 25 06/06/2023    CO2 29 04/27/2013    ANIONGAP 11 06/06/2023    ANIONGAP 10.2 04/27/2013     (H) 06/06/2023     (H) 04/27/2013    BUN 16.6 06/06/2023    BUN 19 04/27/2013    CR 0.95 06/06/2023    CR 1.21 (H) 04/27/2013    GFRESTIMATED 61 06/06/2023    GFRESTIMATED 44 (L) 04/27/2013    GFRESTBLACK 54 (L) 04/27/2013    DANIE 9.6 06/06/2023    DANIE 9.5 04/27/2013        A1C RESULTS:  No results found for: A1C    INR RESULTS:  No results found for: INR        Maura Galeana PA-C, MARY   June 6, 2023         Thank you for allowing me to participate in the care of your patient.      Sincerely,     Maura Galeana PA-C     [facility]  cc:   Laurie Manrique, APRN CNP  [address]  [place] [id number]

## 2023-06-16 ENCOUNTER — TELEPHONE (OUTPATIENT)
Dept: CARDIOLOGY | Facility: CLINIC | Age: 78
End: 2023-06-16
Payer: COMMERCIAL

## 2023-06-16 ENCOUNTER — HOSPITAL ENCOUNTER (OUTPATIENT)
Dept: CARDIOLOGY | Facility: CLINIC | Age: 78
Discharge: HOME | End: 2023-06-16
Attending: PHYSICIAN ASSISTANT | Admitting: PHYSICIAN ASSISTANT
Payer: COMMERCIAL

## 2023-06-16 DIAGNOSIS — E78.5 HYPERLIPIDEMIA LDL GOAL <70: ICD-10-CM

## 2023-06-16 DIAGNOSIS — I25.118 CORONARY ARTERY DISEASE OF NATIVE ARTERY OF NATIVE HEART WITH STABLE ANGINA PECTORIS (H): Primary | ICD-10-CM

## 2023-06-16 DIAGNOSIS — I25.118 CORONARY ARTERY DISEASE OF NATIVE ARTERY OF NATIVE HEART WITH STABLE ANGINA PECTORIS (H): ICD-10-CM

## 2023-06-16 DIAGNOSIS — G47.33 OSA (OBSTRUCTIVE SLEEP APNEA): ICD-10-CM

## 2023-06-16 DIAGNOSIS — I10 BENIGN ESSENTIAL HYPERTENSION: ICD-10-CM

## 2023-06-16 DIAGNOSIS — I42.8 OTHER CARDIOMYOPATHY (H): ICD-10-CM

## 2023-06-16 DIAGNOSIS — I50.23 ACUTE ON CHRONIC SYSTOLIC HEART FAILURE (H): ICD-10-CM

## 2023-06-16 PROCEDURE — 93227 XTRNL ECG REC<48 HR R&I: CPT | Performed by: INTERNAL MEDICINE

## 2023-06-16 PROCEDURE — 93226 XTRNL ECG REC<48 HR SCAN A/R: CPT

## 2023-06-16 RX ORDER — LIDOCAINE 40 MG/G
CREAM TOPICAL
Status: CANCELLED | OUTPATIENT
Start: 2023-06-16

## 2023-06-16 RX ORDER — ASPIRIN 325 MG
325 TABLET ORAL ONCE
Status: CANCELLED | OUTPATIENT
Start: 2023-06-16 | End: 2023-06-16

## 2023-06-16 RX ORDER — SODIUM CHLORIDE 9 MG/ML
INJECTION, SOLUTION INTRAVENOUS CONTINUOUS
Status: CANCELLED | OUTPATIENT
Start: 2023-06-16

## 2023-06-16 RX ORDER — ASPIRIN 81 MG/1
243 TABLET, CHEWABLE ORAL ONCE
Status: CANCELLED | OUTPATIENT
Start: 2023-06-16

## 2023-06-16 RX ORDER — POTASSIUM CHLORIDE 1500 MG/1
20 TABLET, EXTENDED RELEASE ORAL
Status: CANCELLED | OUTPATIENT
Start: 2023-06-16

## 2023-06-16 NOTE — TELEPHONE ENCOUNTER
Coronary angiogram/PCI/Right Heart Cath prep instructions.     Patient is scheduled for a Coronary Angio w/possible PCI at [facility] - [address] [place] [address] - Main Entrance of the Hospital on 6/21/2023.  Check in time is at 1000 and procedure to follow.    Patient instructed to remain NPO for solid foods 8 hours prior to arrival and may have clear liquids up to 2 hours prior to arrival.    Patient Patient does not require extra fluids prior to procedure.    Patient is not diabetic.    Patient is on eliquis (Eliquis, Pradaxa, Xarelto) and has been advised to hold for 2 days prior to procedure starting 6/19/2023.  Patient can resume after the procedure.    Patient is taking torsemide and has been instructed to hold it the morning of procedure.     Patient is not currently taking ASA and has been advised to take one 325 mg tablet the day prior and morning of the procedure.    Pt is not on a SGLT2 inhibitor.    Patient advised to take their other daily medications the morning of the procedure with small sips of water.     Verified patient does not have a contrast allergy.    Verified patient has someone available to drive them home from the hospital and can stay with them for 24 hours after the procedure.     Patient advised to notify care team with any new COVID like symptoms prior to procedure.    Patient will check their temperature the morning of procedure and call [facility] at [phone] if temp is >100.0.    Patient is aware of visitor policy.    Patient expresses understanding of above instructions and denies further questions at this time.

## 2023-06-20 ENCOUNTER — CARE COORDINATION (OUTPATIENT)
Dept: CARDIOLOGY | Facility: CLINIC | Age: 78
End: 2023-06-20
Payer: COMMERCIAL

## 2023-06-20 DIAGNOSIS — I50.21 ACUTE SYSTOLIC HEART FAILURE (H): ICD-10-CM

## 2023-06-20 DIAGNOSIS — I48.0 PAROXYSMAL ATRIAL FIBRILLATION (H): Primary | ICD-10-CM

## 2023-06-20 RX ORDER — METOPROLOL SUCCINATE 50 MG/1
50 TABLET, EXTENDED RELEASE ORAL 2 TIMES DAILY
Qty: 180 TABLET | Refills: 3 | Status: SHIPPED | OUTPATIENT
Start: 2023-06-20 | End: 2023-08-10

## 2023-06-20 NOTE — PROGRESS NOTES
Call out to pt w/recommendations per JUSTA Lorenzo. New rx for metoprolol succinate 50 mg BID sent to pt pharmacy. In the mean time she will use up her current rx for 25 mg tablet and take 2 tablets (50 mg) twice daily until gone then have pharmacy fill new rx for 50 mg tablet going forward.         Maura Galeana PA-C  [facility] Team 7  Holter monitor shows borderline ventricular rate control. Please have patient increase metoprolol to 50 mg BID.     Thanks,   Maura           Previous Messages    Adult Holter Monitor 24 hour  Order: [id number]

## 2023-06-21 ENCOUNTER — HOSPITAL ENCOUNTER (OUTPATIENT)
Facility: CLINIC | Age: 78
Discharge: HOME | End: 2023-06-21
Admitting: INTERNAL MEDICINE
Payer: COMMERCIAL

## 2023-06-21 VITALS
DIASTOLIC BLOOD PRESSURE: 83 MMHG | OXYGEN SATURATION: 95 % | RESPIRATION RATE: 18 BRPM | TEMPERATURE: 97.2 F | HEART RATE: 95 BPM | SYSTOLIC BLOOD PRESSURE: 133 MMHG

## 2023-06-21 DIAGNOSIS — I25.118 CORONARY ARTERY DISEASE OF NATIVE ARTERY OF NATIVE HEART WITH STABLE ANGINA PECTORIS (H): ICD-10-CM

## 2023-06-21 DIAGNOSIS — I50.21 ACUTE SYSTOLIC HEART FAILURE (H): ICD-10-CM

## 2023-06-21 DIAGNOSIS — I10 BENIGN ESSENTIAL HYPERTENSION: ICD-10-CM

## 2023-06-21 DIAGNOSIS — G47.33 OSA (OBSTRUCTIVE SLEEP APNEA): ICD-10-CM

## 2023-06-21 DIAGNOSIS — E78.5 HYPERLIPIDEMIA LDL GOAL <70: ICD-10-CM

## 2023-06-21 DIAGNOSIS — I50.23 ACUTE ON CHRONIC SYSTOLIC HEART FAILURE (H): ICD-10-CM

## 2023-06-21 DIAGNOSIS — I42.8 OTHER CARDIOMYOPATHY (H): ICD-10-CM

## 2023-06-21 DIAGNOSIS — I50.22 CHRONIC SYSTOLIC HEART FAILURE (H): Primary | ICD-10-CM

## 2023-06-21 LAB
ANION GAP SERPL CALCULATED.3IONS-SCNC: 15 MMOL/L (ref 7–15)
APTT PPP: 30 SECONDS (ref 22–38)
BUN SERPL-MCNC: 25.6 MG/DL (ref 8–23)
CALCIUM SERPL-MCNC: 9.6 MG/DL (ref 8.8–10.2)
CHLORIDE SERPL-SCNC: 101 MMOL/L (ref 98–107)
CREAT SERPL-MCNC: 0.91 MG/DL (ref 0.51–0.95)
DEPRECATED HCO3 PLAS-SCNC: 22 MMOL/L (ref 22–29)
ERYTHROCYTE [DISTWIDTH] IN BLOOD BY AUTOMATED COUNT: 17.2 % (ref 10–15)
GFR SERPL CREATININE-BSD FRML MDRD: 64 ML/MIN/1.73M2
GLUCOSE SERPL-MCNC: 119 MG/DL (ref 70–99)
HCT VFR BLD AUTO: 42.8 % (ref 35–47)
HGB BLD-MCNC: 12.8 G/DL (ref 11.7–15.7)
INR PPP: 1.15 (ref 0.85–1.15)
MCH RBC QN AUTO: 26.3 PG (ref 26.5–33)
MCHC RBC AUTO-ENTMCNC: 29.9 G/DL (ref 31.5–36.5)
MCV RBC AUTO: 88 FL (ref 78–100)
PLATELET # BLD AUTO: 431 10E3/UL (ref 150–450)
POTASSIUM SERPL-SCNC: 4.1 MMOL/L (ref 3.4–5.3)
RBC # BLD AUTO: 4.86 10E6/UL (ref 3.8–5.2)
SODIUM SERPL-SCNC: 138 MMOL/L (ref 136–145)
WBC # BLD AUTO: 7.4 10E3/UL (ref 4–11)

## 2023-06-21 PROCEDURE — 999N000127 HC STATISTIC PERIPHERAL IV START W US GUIDANCE

## 2023-06-21 PROCEDURE — 85610 PROTHROMBIN TIME: CPT | Performed by: INTERNAL MEDICINE

## 2023-06-21 PROCEDURE — 82310 ASSAY OF CALCIUM: CPT | Performed by: INTERNAL MEDICINE

## 2023-06-21 PROCEDURE — 85027 COMPLETE CBC AUTOMATED: CPT | Performed by: INTERNAL MEDICINE

## 2023-06-21 PROCEDURE — 258N000003 HC RX IP 258 OP 636: Performed by: INTERNAL MEDICINE

## 2023-06-21 PROCEDURE — 85730 THROMBOPLASTIN TIME PARTIAL: CPT | Performed by: INTERNAL MEDICINE

## 2023-06-21 RX ORDER — ASPIRIN 325 MG
325 TABLET ORAL ONCE
Status: COMPLETED | OUTPATIENT
Start: 2023-06-21 | End: 2023-06-21

## 2023-06-21 RX ORDER — LIDOCAINE 40 MG/G
CREAM TOPICAL
Status: DISCONTINUED | OUTPATIENT
Start: 2023-06-21 | End: 2023-06-21 | Stop reason: HOSPADM

## 2023-06-21 RX ORDER — NITROGLYCERIN 5 MG/ML
VIAL (ML) INTRAVENOUS
Status: DISCONTINUED
Start: 2023-06-21 | End: 2023-06-21 | Stop reason: HOSPADM

## 2023-06-21 RX ORDER — SODIUM CHLORIDE 9 MG/ML
INJECTION, SOLUTION INTRAVENOUS CONTINUOUS
Status: DISCONTINUED | OUTPATIENT
Start: 2023-06-21 | End: 2023-06-21 | Stop reason: HOSPADM

## 2023-06-21 RX ORDER — LIDOCAINE HYDROCHLORIDE 10 MG/ML
INJECTION, SOLUTION EPIDURAL; INFILTRATION; INTRACAUDAL; PERINEURAL
Status: DISCONTINUED
Start: 2023-06-21 | End: 2023-06-21 | Stop reason: HOSPADM

## 2023-06-21 RX ORDER — ASPIRIN 81 MG/1
243 TABLET, CHEWABLE ORAL ONCE
Status: COMPLETED | OUTPATIENT
Start: 2023-06-21 | End: 2023-06-21

## 2023-06-21 RX ORDER — FENTANYL CITRATE 50 UG/ML
INJECTION, SOLUTION INTRAMUSCULAR; INTRAVENOUS
Status: DISCONTINUED
Start: 2023-06-21 | End: 2023-06-21 | Stop reason: WASHOUT

## 2023-06-21 RX ORDER — POTASSIUM CHLORIDE 1500 MG/1
20 TABLET, EXTENDED RELEASE ORAL
Status: DISCONTINUED | OUTPATIENT
Start: 2023-06-21 | End: 2023-06-21 | Stop reason: HOSPADM

## 2023-06-21 RX ORDER — HEPARIN SODIUM 1000 [USP'U]/ML
INJECTION, SOLUTION INTRAVENOUS; SUBCUTANEOUS
Status: DISCONTINUED
Start: 2023-06-21 | End: 2023-06-21 | Stop reason: HOSPADM

## 2023-06-21 RX ORDER — VERAPAMIL HYDROCHLORIDE 2.5 MG/ML
INJECTION, SOLUTION INTRAVENOUS
Status: DISCONTINUED
Start: 2023-06-21 | End: 2023-06-21 | Stop reason: HOSPADM

## 2023-06-21 RX ADMIN — SODIUM CHLORIDE: 9 INJECTION, SOLUTION INTRAVENOUS at 10:25

## 2023-06-21 ASSESSMENT — ACTIVITIES OF DAILY LIVING (ADL)
ADLS_ACUITY_SCORE: 35
ADLS_ACUITY_SCORE: 35

## 2023-06-21 NOTE — PROGRESS NOTES
Dr Flores had discussion with patient.  It was decided to cancel staged PCI today.  Risk outweigh benefit.  Patient having no symptoms.  Patient agreeable to plan.  Heart clinic to call patient for follow up echo then follow up appointment to discuss treatment plan.  Lucretia Ruiz RN

## 2023-06-21 NOTE — PROGRESS NOTES
Didn't fall accidentally, slipped onto floor.  But also has walker and cane around house she uses as self prescribed for convenience.

## 2023-06-21 NOTE — PROGRESS NOTES
Patient discharged home, accompanied by daughter, Юлия.  Procedure was cancelled, no sedation given.  Patient very happy with outcome of today, preferring medical management than invasive treatment.  Reviewed follow up plan/management with patient.  Lucretia Ruiz RN

## 2023-06-21 NOTE — PROGRESS NOTES
Brief Cardiology Note    Patient arrived today for staged RPL branch PCI following successful PCI of the distal LCx and diagonal branch last month.  Since discharge from the hospital, she thankfully has been feeling very well with resolution of her edema and now has good rate control as well.  She clearly has no anginal symptoms at present (possible that dyspnea prior to May 2023 admission was an anginal equivalent, though this was also in the setting of new AF/RVR and decompensated HF).  Given how well she is currently doing, and current evidence from trial such as ISCHEMIA and REVIVED-BCIS2, I think that the potential benefit of PL branch PCI at this time is likely quite low, and probably outweighed by the risk (even though the risk itself is also quite low).  I discussed this with the patient, and she expressed understanding and was comfortable with canceling the procedure for now.  I did explain that this all certainly could change if she begins developing anginal symptoms in the future.  If that were to occur, then we would have a very clear potential benefit to angiography/PCI, and it would be more appropriate to proceed.  I also discussed this with Dr. Craig, who will be her primary cardiologist going forward, and he agrees with this plan.  I also updated the patient's daughter, who was also in agreement.    For now, we will discontinue the procedure and discharge Fanny home.  I will also have our nursing team arrange a repeat echocardiogram to reassess her LV function at some point over the next few weeks, and will defer further care to her primary cardiologist, Dr. Craig.        Ron Flores MD  Interventional Cardiology  6/21/2023

## 2023-06-28 LAB
ATRIAL RATE - MUSE: 115 BPM
DIASTOLIC BLOOD PRESSURE - MUSE: NORMAL MMHG
INTERPRETATION ECG - MUSE: NORMAL
P AXIS - MUSE: NORMAL DEGREES
PR INTERVAL - MUSE: NORMAL MS
QRS DURATION - MUSE: 82 MS
QT - MUSE: 370 MS
QTC - MUSE: 457 MS
R AXIS - MUSE: 11 DEGREES
SYSTOLIC BLOOD PRESSURE - MUSE: NORMAL MMHG
T AXIS - MUSE: 58 DEGREES
VENTRICULAR RATE- MUSE: 92 BPM

## 2023-07-10 ENCOUNTER — HOSPITAL ENCOUNTER (OUTPATIENT)
Dept: CARDIAC REHAB | Facility: CLINIC | Age: 78
Discharge: HOME | End: 2023-07-10
Attending: INTERNAL MEDICINE
Payer: COMMERCIAL

## 2023-07-10 DIAGNOSIS — R79.89 ELEVATED TROPONIN: ICD-10-CM

## 2023-07-10 PROCEDURE — 93798 PHYS/QHP OP CAR RHAB W/ECG: CPT | Performed by: OCCUPATIONAL THERAPIST

## 2023-07-10 PROCEDURE — 93797 PHYS/QHP OP CAR RHAB WO ECG: CPT | Mod: 59 | Performed by: OCCUPATIONAL THERAPIST

## 2023-07-14 ENCOUNTER — HOSPITAL ENCOUNTER (OUTPATIENT)
Dept: CARDIOLOGY | Facility: CLINIC | Age: 78
Discharge: HOME | End: 2023-07-14
Attending: INTERNAL MEDICINE | Admitting: INTERNAL MEDICINE
Payer: COMMERCIAL

## 2023-07-14 DIAGNOSIS — I50.22 CHRONIC SYSTOLIC HEART FAILURE (H): ICD-10-CM

## 2023-07-14 DIAGNOSIS — I42.8 OTHER CARDIOMYOPATHY (H): ICD-10-CM

## 2023-07-14 LAB — LVEF ECHO: NORMAL

## 2023-07-14 PROCEDURE — 93308 TTE F-UP OR LMTD: CPT | Mod: 26 | Performed by: INTERNAL MEDICINE

## 2023-07-14 PROCEDURE — 93325 DOPPLER ECHO COLOR FLOW MAPG: CPT

## 2023-07-14 PROCEDURE — 93321 DOPPLER ECHO F-UP/LMTD STD: CPT | Mod: 26 | Performed by: INTERNAL MEDICINE

## 2023-07-14 PROCEDURE — 93325 DOPPLER ECHO COLOR FLOW MAPG: CPT | Mod: 26 | Performed by: INTERNAL MEDICINE

## 2023-07-14 PROCEDURE — 93308 TTE F-UP OR LMTD: CPT

## 2023-07-20 ENCOUNTER — LAB (OUTPATIENT)
Dept: LAB | Facility: CLINIC | Age: 78
End: 2023-07-20
Payer: COMMERCIAL

## 2023-07-20 ENCOUNTER — DOCUMENTATION ONLY (OUTPATIENT)
Dept: CARDIOLOGY | Facility: CLINIC | Age: 78
End: 2023-07-20

## 2023-07-20 ENCOUNTER — OFFICE VISIT (OUTPATIENT)
Dept: CARDIOLOGY | Facility: CLINIC | Age: 78
End: 2023-07-20
Attending: PHYSICIAN ASSISTANT
Payer: COMMERCIAL

## 2023-07-20 VITALS
SYSTOLIC BLOOD PRESSURE: 112 MMHG | BODY MASS INDEX: 41.82 KG/M2 | DIASTOLIC BLOOD PRESSURE: 70 MMHG | HEIGHT: 65 IN | WEIGHT: 251 LBS | OXYGEN SATURATION: 96 % | HEART RATE: 72 BPM

## 2023-07-20 DIAGNOSIS — G47.33 OSA (OBSTRUCTIVE SLEEP APNEA): ICD-10-CM

## 2023-07-20 DIAGNOSIS — I50.23 ACUTE ON CHRONIC SYSTOLIC HEART FAILURE (H): ICD-10-CM

## 2023-07-20 DIAGNOSIS — I42.8 OTHER CARDIOMYOPATHY (H): ICD-10-CM

## 2023-07-20 DIAGNOSIS — I48.19 PERSISTENT ATRIAL FIBRILLATION (H): Primary | ICD-10-CM

## 2023-07-20 DIAGNOSIS — I25.118 CORONARY ARTERY DISEASE OF NATIVE ARTERY OF NATIVE HEART WITH STABLE ANGINA PECTORIS (H): ICD-10-CM

## 2023-07-20 DIAGNOSIS — E78.5 HYPERLIPIDEMIA LDL GOAL <70: ICD-10-CM

## 2023-07-20 DIAGNOSIS — I10 BENIGN ESSENTIAL HYPERTENSION: ICD-10-CM

## 2023-07-20 DIAGNOSIS — I50.23 ACUTE ON CHRONIC SYSTOLIC HEART FAILURE (H): Primary | ICD-10-CM

## 2023-07-20 LAB
ANION GAP SERPL CALCULATED.3IONS-SCNC: 11 MMOL/L (ref 7–15)
BUN SERPL-MCNC: 18.3 MG/DL (ref 8–23)
CALCIUM SERPL-MCNC: 9.5 MG/DL (ref 8.8–10.2)
CHLORIDE SERPL-SCNC: 103 MMOL/L (ref 98–107)
CREAT SERPL-MCNC: 0.88 MG/DL (ref 0.51–0.95)
DEPRECATED HCO3 PLAS-SCNC: 22 MMOL/L (ref 22–29)
GFR SERPL CREATININE-BSD FRML MDRD: 67 ML/MIN/1.73M2
GLUCOSE SERPL-MCNC: 132 MG/DL (ref 70–99)
POTASSIUM SERPL-SCNC: 4.2 MMOL/L (ref 3.4–5.3)
SODIUM SERPL-SCNC: 136 MMOL/L (ref 136–145)

## 2023-07-20 PROCEDURE — 99215 OFFICE O/P EST HI 40 MIN: CPT | Performed by: PHYSICIAN ASSISTANT

## 2023-07-20 PROCEDURE — 80048 BASIC METABOLIC PNL TOTAL CA: CPT | Performed by: PHYSICIAN ASSISTANT

## 2023-07-20 PROCEDURE — 36415 COLL VENOUS BLD VENIPUNCTURE: CPT | Performed by: PHYSICIAN ASSISTANT

## 2023-07-20 NOTE — PROGRESS NOTES
Review [employer] Patient Assistance forms with Pearl after CORE appt with Maura. Pearl did sign patient portion. She will drop of proof of income so completed application an be faxed. Prescription will need to be printed, signed and faxed with completed application.     MIRTHA Rhodes, RN 12:39 PM 07/20/23

## 2023-07-20 NOTE — PROGRESS NOTES
Reviewed [facility] patient assistance paperwork for Jardiance with Pearl after CORE appt with Maura. Pearl did sign patient portion. She will drop of proof of income so completed application an be faxed. Prescription will need to be printed, signed and faxed with completed application.     MIRTHA Rhodes, RN 12:38 PM 07/20/23

## 2023-07-20 NOTE — PATIENT INSTRUCTIONS
Today's Plan:   1) Set up follow up with rhythm specialist in 3 months   2) Schedule heart MRI in 1-2 months.   3) We will look into patient assistance program for Jardiance and Entresto. These medications will help improve your heart function.     Please, remember to continue the followin.  Weigh yourself daily. Call if your weight is up > than 2 pounds in one day, or 5 pounds in one week; if you feel more short of breath or have worsening swelling in your legs or abdomen.  2.  Eat a low sodium diet (less than 2,000mg or 2g daily). If you eat less salt, you will retain less fluid.   3.  Avoid alcohol, as this can worsen heart failure.   4.  Avoid NSAIDs as able (For example, Ibuprofen / aleve / advil / naprosen / diclofenac).    Please call CORE nurse for any questions or concerns Mon-Fri 8am-4pm:   [phone]  For concerns after hours:   [phone] option 2   Scheduling phone number:   [phone]    Thank you for visiting with us today.   Maura Galeana PA-C  ______________________________________________________________

## 2023-07-20 NOTE — PROGRESS NOTES
Primary Cardiologist: Dr. Craig    Reason for Visit: CORE follow up    History of Present Illness:   Fanny is a pleasant 78 year old female with past medical history notable for:      # Acute HFrEF  -- LVEF 25-30%, mild RV dysfunction, mild PH  -- Etiology likely mixed ischemic and non-ischemic with contribution from atrial fibrillation with RVR  - GDMT includes losartan and metoprolol    # CAD  -- She recently underwent PCI with LONG to D1, LONG to distal left circumflex artery.    -- Residual high-grade RPL lesion, was planned for staged PCI but this was cancelled as Dr. Flores and  -- Rafa did not feel it was severe enough lesion and patient was asymptomatic so medical therapy was recommended.     # Paroxysmal atrial fibrillation with RVR (resolved)  -Rate control strategy with metoprolol which was recently increased in dose due to suboptimal ventricular rates on Holter monitor in 6/2023  - Apixaban for thromboembolic prophylaxis    # HTN  # Suspected JOANNE  # Hypothyroidism      Pearl reports she is doing well.  She denies shortness of breath, orthopnea, abdominal distention, peripheral edema.  Her repeat echocardiogram shows persistently depressed LVEF.  We will continue to optimize her cardiomyopathy medications.    Assessment and Plan:  Fanny is a pleasant 78 year old female with past medical history notable for:      # Acute HFrEF  -- LVEF 25-30%, mild RV dysfunction, mild PH  -- Etiology likely mixed ischemic and non-ischemic with contribution from atrial fibrillation with RVR  - GDMT includes losartan and metoprolol    # CAD  -- She recently underwent PCI with LONG to D1, LONG to distal left circumflex artery.    -- Residual high-grade RPL lesion, was planned for staged PCI but this was cancelled as Dr. Flores and Dr. Volodymyr Craig did not feel it was severe enough lesion and patient was asymptomatic so medical therapy was recommended.     # Paroxysmal atrial fibrillation with RVR (resolved)  -Rate control  strategy with metoprolol which was recently increased in dose due to suboptimal ventricular rates on Holter monitor in 6/2023  - Apixaban for thromboembolic prophylaxis    # HTN  # Suspected JOANNE  # Hypothyroidism      We will see if we can initiate Jardiance as well as Entresto.  Patient is not sure if he can afford these medications thus we will see if she qualifies for patient assistance programs.  My CORE nurse team will work on this.  After titration of her medications we will consider cardiac MRI to rule out other causes of her cardiomyopathy.  We will also place a referral for EP in 3 to 4 months for consideration of ICD implantation.  I will also have her obtain 24-hour Holter monitor again to see if her pulse is under better control with increase in metoprolol.  We talked about importance of low-sodium diet and daily weight checks.  Her weight has been stable.  She appears to be in euvolemic state based on today's exam.  I reviewed her BMP results from today which show stable renal function normal electrolytes.    We were able to start her on Jardiance or Entresto I will like to follow-up 2 weeks after with repeat BMP for further evaluation and adjustments.    50 minutes spent on the date of the encounter with chart review, patient visit, care coordination, and documentation.      This note was completed in part using Dragon voice recognition software. Although reviewed after completion, some word and grammatical errors may occur.    Orders this Visit:  Orders Placed This Encounter   Procedures     Basic metabolic panel     Follow-Up with Cardiology CAMMIE CORE     Follow-Up with Cardiology EP     No orders of the defined types were placed in this encounter.    There are no discontinued medications.      Encounter Diagnoses   Name Primary?     Acute on chronic systolic heart failure (H)      Other cardiomyopathy (H)      Coronary artery disease of native artery of native heart with stable angina pectoris (H)       Hyperlipidemia LDL goal <70      Benign essential hypertension      JOANNE (obstructive sleep apnea)        CURRENT MEDICATIONS:  Current Outpatient Medications   Medication Sig Dispense Refill     apixaban ANTICOAGULANT (ELIQUIS) 5 MG tablet Take 1 tablet (5 mg) by mouth 2 times daily       atorvastatin (LIPITOR) 40 MG tablet Take 1 tablet (40 mg) by mouth daily 90 tablet 3     clopidogrel (PLAVIX) 75 MG tablet Take 1 tablet (75 mg) by mouth daily       famotidine (PEPCID) 20 MG tablet Take 1 tablet (20 mg) by mouth 2 times daily       levothyroxine (SYNTHROID/LEVOTHROID) 112 MCG tablet Take 112 mcg by mouth every morning (before breakfast)       losartan (COZAAR) 25 MG tablet Take 0.5 tablets (12.5 mg) by mouth every evening       metoprolol succinate ER (TOPROL XL) 50 MG 24 hr tablet Take 1 tablet (50 mg) by mouth 2 times daily 180 tablet 3     torsemide (DEMADEX) 10 MG tablet Take 1 tablet (10 mg) by mouth daily         ALLERGIES     Allergies   Allergen Reactions     Codeine Sulfate Nausea     Penicillins Other (See Comments)     Mother had severe anaphylaxis - was told as child never take PCN       PAST MEDICAL HISTORY:  No past medical history on file.    PAST SURGICAL HISTORY:  Past Surgical History:   Procedure Laterality Date     APPENDECTOMY       CV CORONARY ANGIOGRAM N/A 5/30/2023    Procedure: Coronary Angiogram;  Surgeon: Bryant Simental MD;  Location:  HEART CARDIAC CATH LAB     CV PCI N/A 5/30/2023    Procedure: Percutaneous Coronary Intervention;  Surgeon: Bryant Simental MD;  Location:  HEART CARDIAC CATH LAB     ENT SURGERY       GYN SURGERY         FAMILY HISTORY:  Family History   Problem Relation Age of Onset     Heart Disease Mother         Per pt something heart related- unsure what     Heart Disease Father         Per pt something heart related- unsure what       SOCIAL HISTORY:  Social History     Socioeconomic History     Marital status:      Spouse name: None     Number  "of children: None     Years of education: None     Highest education level: None   Tobacco Use     Smoking status: Never   Substance and Sexual Activity     Alcohol use: No     Drug use: No       Review of Systems:  Skin:        Eyes:       ENT:       Respiratory:  Negative    Cardiovascular:    Positive for;fatigue;edema  Gastroenterology:      Genitourinary:       Musculoskeletal:       Neurologic:       Psychiatric:       Heme/Lymph/Imm:       Endocrine:         Physical Exam:  Vitals: /70 (BP Location: Right arm, Patient Position: Sitting, Cuff Size: Adult Large)   Pulse 72   Ht 1.651 m (5' 5\")   Wt 113.9 kg (251 lb)   SpO2 96%   BMI 41.77 kg/m       GEN:  NAD  NECK: Unable to assess JVP due to neck anatomy.    C/V: Irregularly irregular rhythm but normal rate, no murmur, rub or gallop.  RESP: Clear to auscultation bilaterally without wheezing, rales, or rhonchi.  GI: Abdomen soft, nontender, nondistended. No HSM appreciated.   EXTREM: No pitting LE edema.   NEURO: Alert and oriented, cooperative. No obvious focal deficits.   PSYCH: Normal affect.  SKIN: Warm and dry.       Recent Lab Results:  LIPID RESULTS:  Lab Results   Component Value Date    CHOL 135 05/30/2023    HDL 70 05/30/2023    LDL 47 05/30/2023    TRIG 91 05/30/2023       LIVER ENZYME RESULTS:  Lab Results   Component Value Date    AST 33 05/25/2023    ALT 26 05/25/2023       CBC RESULTS:  Lab Results   Component Value Date    WBC 7.4 06/21/2023    WBC 14.2 (H) 04/27/2013    RBC 4.86 06/21/2023    RBC 4.46 04/27/2013    HGB 12.8 06/21/2023    HGB 14.0 04/27/2013    HCT 42.8 06/21/2023    HCT 42.1 04/27/2013    MCV 88 06/21/2023    MCV 94 04/27/2013    MCH 26.3 (L) 06/21/2023    MCH 31.4 04/27/2013    MCHC 29.9 (L) 06/21/2023    MCHC 33.3 04/27/2013    RDW 17.2 (H) 06/21/2023    RDW 12.7 04/27/2013     06/21/2023     04/27/2013       BMP RESULTS:  Lab Results   Component Value Date     07/20/2023     04/27/2013 "    POTASSIUM 4.2 07/20/2023    POTASSIUM 4.5 04/27/2013    CHLORIDE 103 07/20/2023    CHLORIDE 101 04/27/2013    CO2 22 07/20/2023    CO2 29 04/27/2013    ANIONGAP 11 07/20/2023    ANIONGAP 10.2 04/27/2013     (H) 07/20/2023     (H) 04/27/2013    BUN 18.3 07/20/2023    BUN 19 04/27/2013    CR 0.88 07/20/2023    CR 1.21 (H) 04/27/2013    GFRESTIMATED 67 07/20/2023    GFRESTIMATED 44 (L) 04/27/2013    GFRESTBLACK 54 (L) 04/27/2013    DANIE 9.5 07/20/2023    DANIE 9.5 04/27/2013        A1C RESULTS:  No results found for: A1C    INR RESULTS:  Lab Results   Component Value Date    INR 1.15 06/21/2023           Maura Galeana PA-C, MARY   July 20, 2023

## 2023-07-20 NOTE — LETTER
7/20/2023    [facility]  [address] [address]    RE: Fanny RAHEEL Myers       Dear Colleague,     I had the pleasure of seeing Fanny Myers in the [facility].  Primary Cardiologist: Dr. Craig    Reason for Visit: CORE follow up    History of Present Illness:   Fanny is a pleasant 78 year old female with past medical history notable for:      # Acute HFrEF  -- LVEF 25-30%, mild RV dysfunction, mild PH  -- Etiology likely mixed ischemic and non-ischemic with contribution from atrial fibrillation with RVR  - GDMT includes losartan and metoprolol    # CAD  -- She recently underwent PCI with LONG to D1, LONG to distal left circumflex artery.    -- Residual high-grade RPL lesion, was planned for staged PCI but this was cancelled as Dr. Flores and  -- Rafa did not feel it was severe enough lesion and patient was asymptomatic so medical therapy was recommended.     # Paroxysmal atrial fibrillation with RVR (resolved)  -Rate control strategy with metoprolol which was recently increased in dose due to suboptimal ventricular rates on Holter monitor in 6/2023  - Apixaban for thromboembolic prophylaxis    # HTN  # Suspected JOANNE  # Hypothyroidism      Pearl reports she is doing well.  She denies shortness of breath, orthopnea, abdominal distention, peripheral edema.  Her repeat echocardiogram shows persistently depressed LVEF.  We will continue to optimize her cardiomyopathy medications.    Assessment and Plan:  Fanny is a pleasant 78 year old female with past medical history notable for:      # Acute HFrEF  -- LVEF 25-30%, mild RV dysfunction, mild PH  -- Etiology likely mixed ischemic and non-ischemic with contribution from atrial fibrillation with RVR  - GDMT includes losartan and metoprolol    # CAD  -- She recently underwent PCI with LONG to D1, LONG to distal left circumflex artery.    -- Residual high-grade RPL lesion, was planned for staged PCI but this was cancelled as  Dr. Flores and  -- Rafa did not feel it was severe enough lesion and patient was asymptomatic so medical therapy was recommended.     # Paroxysmal atrial fibrillation with RVR (resolved)  -Rate control strategy with metoprolol which was recently increased in dose due to suboptimal ventricular rates on Holter monitor in 6/2023  - Apixaban for thromboembolic prophylaxis    # HTN  # Suspected JOANNE  # Hypothyroidism      We will see if we can initiate Jardiance as well as Entresto.  Patient is not sure if he can afford these medications thus we will see if she qualifies for patient assistance programs.  My CORE nurse team will work on this.  After titration of her medications we will consider cardiac MRI to rule out other causes of her cardiomyopathy.  We will also place a referral for EP in 3 to 4 months for consideration of ICD implantation.  I will also have her obtain 24-hour Holter monitor again to see if her pulse is under better control with increase in metoprolol.  We talked about importance of low-sodium diet and daily weight checks.  Her weight has been stable.  She appears to be in euvolemic state based on today's exam.  I reviewed her BMP results from today which show stable renal function normal electrolytes.    We were able to start her on Jardiance or Entresto I will like to follow-up 2 weeks after with repeat BMP for further evaluation and adjustments.    50 minutes spent on the date of the encounter with chart review, patient visit, care coordination, and documentation.      This note was completed in part using Dragon voice recognition software. Although reviewed after completion, some word and grammatical errors may occur.    Orders this Visit:  Orders Placed This Encounter   Procedures    Basic metabolic panel    Follow-Up with Cardiology CAMMIE CORE    Follow-Up with Cardiology EP     No orders of the defined types were placed in this encounter.    There are no discontinued medications.      Encounter  Diagnoses   Name Primary?    Acute on chronic systolic heart failure (H)     Other cardiomyopathy (H)     Coronary artery disease of native artery of native heart with stable angina pectoris (H)     Hyperlipidemia LDL goal <70     Benign essential hypertension     JOANNE (obstructive sleep apnea)        CURRENT MEDICATIONS:  Current Outpatient Medications   Medication Sig Dispense Refill    apixaban ANTICOAGULANT (ELIQUIS) 5 MG tablet Take 1 tablet (5 mg) by mouth 2 times daily      atorvastatin (LIPITOR) 40 MG tablet Take 1 tablet (40 mg) by mouth daily 90 tablet 3    clopidogrel (PLAVIX) 75 MG tablet Take 1 tablet (75 mg) by mouth daily      famotidine (PEPCID) 20 MG tablet Take 1 tablet (20 mg) by mouth 2 times daily      levothyroxine (SYNTHROID/LEVOTHROID) 112 MCG tablet Take 112 mcg by mouth every morning (before breakfast)      losartan (COZAAR) 25 MG tablet Take 0.5 tablets (12.5 mg) by mouth every evening      metoprolol succinate ER (TOPROL XL) 50 MG 24 hr tablet Take 1 tablet (50 mg) by mouth 2 times daily 180 tablet 3    torsemide (DEMADEX) 10 MG tablet Take 1 tablet (10 mg) by mouth daily         ALLERGIES     Allergies   Allergen Reactions    Codeine Sulfate Nausea    Penicillins Other (See Comments)     Mother had severe anaphylaxis - was told as child never take PCN       PAST MEDICAL HISTORY:  No past medical history on file.    PAST SURGICAL HISTORY:  Past Surgical History:   Procedure Laterality Date    APPENDECTOMY      CV CORONARY ANGIOGRAM N/A 5/30/2023    Procedure: Coronary Angiogram;  Surgeon: Bryant Simental MD;  Location:  HEART CARDIAC CATH LAB    CV PCI N/A 5/30/2023    Procedure: Percutaneous Coronary Intervention;  Surgeon: Bryant Simental MD;  Location:  HEART CARDIAC CATH LAB    ENT SURGERY      GYN SURGERY         FAMILY HISTORY:  Family History   Problem Relation Age of Onset    Heart Disease Mother         Per pt something heart related- unsure what    Heart Disease Father   "       Per pt something heart related- unsure what       SOCIAL HISTORY:  Social History     Socioeconomic History    Marital status:      Spouse name: None    Number of children: None    Years of education: None    Highest education level: None   Tobacco Use    Smoking status: Never   Substance and Sexual Activity    Alcohol use: No    Drug use: No       Review of Systems:  Skin:        Eyes:       ENT:       Respiratory:  Negative    Cardiovascular:    Positive for;fatigue;edema  Gastroenterology:      Genitourinary:       Musculoskeletal:       Neurologic:       Psychiatric:       Heme/Lymph/Imm:       Endocrine:         Physical Exam:  Vitals: /70 (BP Location: Right arm, Patient Position: Sitting, Cuff Size: Adult Large)   Pulse 72   Ht 1.651 m (5' 5\")   Wt 113.9 kg (251 lb)   SpO2 96%   BMI 41.77 kg/m       GEN:  NAD  NECK: Unable to assess JVP due to neck anatomy.    C/V: Irregularly irregular rhythm but normal rate, no murmur, rub or gallop.  RESP: Clear to auscultation bilaterally without wheezing, rales, or rhonchi.  GI: Abdomen soft, nontender, nondistended. No HSM appreciated.   EXTREM: No pitting LE edema.   NEURO: Alert and oriented, cooperative. No obvious focal deficits.   PSYCH: Normal affect.  SKIN: Warm and dry.       Recent Lab Results:  LIPID RESULTS:  Lab Results   Component Value Date    CHOL 135 05/30/2023    HDL 70 05/30/2023    LDL 47 05/30/2023    TRIG 91 05/30/2023       LIVER ENZYME RESULTS:  Lab Results   Component Value Date    AST 33 05/25/2023    ALT 26 05/25/2023       CBC RESULTS:  Lab Results   Component Value Date    WBC 7.4 06/21/2023    WBC 14.2 (H) 04/27/2013    RBC 4.86 06/21/2023    RBC 4.46 04/27/2013    HGB 12.8 06/21/2023    HGB 14.0 04/27/2013    HCT 42.8 06/21/2023    HCT 42.1 04/27/2013    MCV 88 06/21/2023    MCV 94 04/27/2013    MCH 26.3 (L) 06/21/2023    MCH 31.4 04/27/2013    MCHC 29.9 (L) 06/21/2023    MCHC 33.3 04/27/2013    RDW 17.2 (H) " 06/21/2023    RDW 12.7 04/27/2013     06/21/2023     04/27/2013       BMP RESULTS:  Lab Results   Component Value Date     07/20/2023     04/27/2013    POTASSIUM 4.2 07/20/2023    POTASSIUM 4.5 04/27/2013    CHLORIDE 103 07/20/2023    CHLORIDE 101 04/27/2013    CO2 22 07/20/2023    CO2 29 04/27/2013    ANIONGAP 11 07/20/2023    ANIONGAP 10.2 04/27/2013     (H) 07/20/2023     (H) 04/27/2013    BUN 18.3 07/20/2023    BUN 19 04/27/2013    CR 0.88 07/20/2023    CR 1.21 (H) 04/27/2013    GFRESTIMATED 67 07/20/2023    GFRESTIMATED 44 (L) 04/27/2013    GFRESTBLACK 54 (L) 04/27/2013    DANIE 9.5 07/20/2023    DANIE 9.5 04/27/2013        A1C RESULTS:  No results found for: A1C    INR RESULTS:  Lab Results   Component Value Date    INR 1.15 06/21/2023     Maura Galeana PA-C, MARY   July 20, 2023       Thank you for allowing me to participate in the care of your patient.      Sincerely,   Maura Galeana PA-C   [facility]  cc:   Maura Galeana PA-C  [id number] [id number]

## 2023-07-21 ENCOUNTER — TELEPHONE (OUTPATIENT)
Dept: CARDIOLOGY | Facility: CLINIC | Age: 78
End: 2023-07-21
Payer: COMMERCIAL

## 2023-07-21 NOTE — TELEPHONE ENCOUNTER
Holter appt completed      Future Appointments   Date Time Provider Department Center   8/7/2023  1:00 PM [facility] DEVICE RM [facility]   8/22/2023 11:15 AM SCIMR1 [facility]CVMR CVIMG   8/29/2023 12:15 PM RU LAB [facility]   8/29/2023  1:10 PM Shyann Abarca PA-C [facility]   11/22/2023 10:15 AM Adrian Andrea MD [facility]     MIRTHA Rhodes, RN 3:28 PM 07/21/23

## 2023-07-21 NOTE — TELEPHONE ENCOUNTER
Left vm for Fanny to let her know that Maura would like to check a 24 hr Holter to check heart rates after increasing Metoprolol       Message to scheduling           Belkys Cao, VINN, RN 3:03 PM 07/21/23

## 2023-07-25 NOTE — PROGRESS NOTES
Message left for Minerva to provide proof of income.       Belkys Cao, VINN, RN 11:05 AM 07/25/23

## 2023-07-25 NOTE — PROGRESS NOTES
Message left for Minerva to provide proof of income.         Belkys Cao, VINN, RN 11:05 AM 07/25/23

## 2023-08-02 NOTE — PROGRESS NOTES
Left second voice regarding Jardiance application.    Future Appointments   Date Time Provider Department Center   8/7/2023  1:00 PM [facility] DEVICE RM [facility]   8/22/2023 11:15 AM SCIMR1 [facility] CVIMG   8/29/2023 12:15 PM  LAB [facility]   8/29/2023  1:10 PM Shyann Abarca PA-C [facility]   11/22/2023 10:15 AM Adrian Andrea MD [facility]         MIRTHA Rhodes, RN 1:16 PM 08/02/23

## 2023-08-07 ENCOUNTER — HOSPITAL ENCOUNTER (OUTPATIENT)
Dept: CARDIOLOGY | Facility: CLINIC | Age: 78
Discharge: HOME | End: 2023-08-07
Attending: PHYSICIAN ASSISTANT | Admitting: PHYSICIAN ASSISTANT
Payer: COMMERCIAL

## 2023-08-07 DIAGNOSIS — I48.19 PERSISTENT ATRIAL FIBRILLATION (H): ICD-10-CM

## 2023-08-07 DIAGNOSIS — I50.23 ACUTE ON CHRONIC SYSTOLIC HEART FAILURE (H): ICD-10-CM

## 2023-08-07 PROCEDURE — 93226 XTRNL ECG REC<48 HR SCAN A/R: CPT

## 2023-08-07 PROCEDURE — 93227 XTRNL ECG REC<48 HR R&I: CPT | Performed by: INTERNAL MEDICINE

## 2023-08-10 ENCOUNTER — TELEPHONE (OUTPATIENT)
Dept: CARDIOLOGY | Facility: CLINIC | Age: 78
End: 2023-08-10
Payer: COMMERCIAL

## 2023-08-10 DIAGNOSIS — I50.21 ACUTE SYSTOLIC HEART FAILURE (H): ICD-10-CM

## 2023-08-10 DIAGNOSIS — I48.0 PAROXYSMAL ATRIAL FIBRILLATION (H): ICD-10-CM

## 2023-08-10 RX ORDER — METOPROLOL SUCCINATE 50 MG/1
TABLET, EXTENDED RELEASE ORAL
Qty: 270 TABLET | Refills: 3 | Status: SHIPPED | OUTPATIENT
Start: 2023-08-10 | End: 2023-08-29

## 2023-08-10 NOTE — TELEPHONE ENCOUNTER
----- Message from Maura Galeana PA-C sent at 8/10/2023  3:35 PM CDT -----  Average HR is 91 bpm. Let's increase morning metoprolol dose to 100 mg and keep evening dose to 50 mg.     Thanks,  Maura

## 2023-08-10 NOTE — TELEPHONE ENCOUNTER
[facility]    Called Minerva. Reviewed Maura Candelario PA-C's recommendation.    Average HR is 91 bpm. Let's increase morning metoprolol dose to 100 mg and keep evening dose to 50 mg.     Thanks,  Yunyasmin Palma expresses understanding. She tells me she planned to  a refill on her metoprolol tomorrow AM. I let her know I would send the update Rx now so the instructions were correct. She expresses understanding with no further questions.    Gisela Manuel, RN, BSN  [facility]- [place], [place]  [facility] Care Coordinator  August 10, 2023 3:52 PM

## 2023-08-21 ENCOUNTER — DOCUMENTATION ONLY (OUTPATIENT)
Dept: CARDIOLOGY | Facility: CLINIC | Age: 78
End: 2023-08-21
Payer: COMMERCIAL

## 2023-08-21 NOTE — PROGRESS NOTES
I am working on assistance applications for both Jardiance- [employer], and Entresto-[employer]  I was able to fax the Provider page,med list,to [facility]    I am needing to have a signed Entresto RX to accompany the provider portion.   I have asked CORE Rn to add the Entresto script to EMR if she is able. I will try to Catch Ayrayyasmin in Clinic tomorrow 8- to sign the RX  .  [facility] lpn      8-  Maura was working virtual , so core RN was able to get Dr Craig to sign joshua and RX for Entresto  I faxed 10 pages to [employer]  I prev faxed the provider portion to [facility], we did not have the pt portion which she may have sent in separate  [facility]      8- APPROVAL FOR BOTH JARDIANCE AND ENTRESTO  EFFECTIVE NOW THRU -2023  [facility]

## 2023-08-21 NOTE — PROGRESS NOTES
[facility] - C.O.RVegaE. Clinic    Pt dropped off proof of income.  Faxed to Deanna Baum as Maura Candelario wanting to see if pt can get approved for both Entresto and Jardiance pt assistance programs.     Nadia Victoria RN BSN   [facility]- [place], [place]  MAUDE.O.R.E. Clinic Care Coordinator  08/21/23, 9:17 AM

## 2023-08-22 ENCOUNTER — HOSPITAL ENCOUNTER (OUTPATIENT)
Dept: CARDIOLOGY | Facility: CLINIC | Age: 78
Discharge: HOME | End: 2023-08-22
Attending: PHYSICIAN ASSISTANT | Admitting: PHYSICIAN ASSISTANT
Payer: COMMERCIAL

## 2023-08-22 DIAGNOSIS — I25.118 CORONARY ARTERY DISEASE OF NATIVE ARTERY OF NATIVE HEART WITH STABLE ANGINA PECTORIS (H): ICD-10-CM

## 2023-08-22 DIAGNOSIS — G47.33 OSA (OBSTRUCTIVE SLEEP APNEA): ICD-10-CM

## 2023-08-22 DIAGNOSIS — E78.5 HYPERLIPIDEMIA LDL GOAL <70: ICD-10-CM

## 2023-08-22 DIAGNOSIS — I50.23 ACUTE ON CHRONIC SYSTOLIC HEART FAILURE (H): ICD-10-CM

## 2023-08-22 DIAGNOSIS — I42.8 OTHER CARDIOMYOPATHY (H): ICD-10-CM

## 2023-08-22 DIAGNOSIS — I10 BENIGN ESSENTIAL HYPERTENSION: ICD-10-CM

## 2023-08-22 PROCEDURE — A9585 GADOBUTROL INJECTION: HCPCS | Performed by: PHYSICIAN ASSISTANT

## 2023-08-22 PROCEDURE — 75561 CARDIAC MRI FOR MORPH W/DYE: CPT | Mod: 26 | Performed by: INTERNAL MEDICINE

## 2023-08-22 PROCEDURE — 75561 CARDIAC MRI FOR MORPH W/DYE: CPT

## 2023-08-22 PROCEDURE — 255N000002 HC RX 255 OP 636: Performed by: PHYSICIAN ASSISTANT

## 2023-08-22 RX ORDER — ACYCLOVIR 200 MG/1
0-1 CAPSULE ORAL
Status: DISCONTINUED | OUTPATIENT
Start: 2023-08-22 | End: 2023-08-23 | Stop reason: HOSPADM

## 2023-08-22 RX ORDER — SACUBITRIL AND VALSARTAN 24; 26 MG/1; MG/1
1 TABLET, FILM COATED ORAL 2 TIMES DAILY
Qty: 180 TABLET | Refills: 3 | COMMUNITY
Start: 2023-08-22 | End: 2023-08-22

## 2023-08-22 RX ORDER — ALBUTEROL SULFATE 90 UG/1
2 AEROSOL, METERED RESPIRATORY (INHALATION) EVERY 5 MIN PRN
Status: DISCONTINUED | OUTPATIENT
Start: 2023-08-22 | End: 2023-08-23 | Stop reason: HOSPADM

## 2023-08-22 RX ORDER — METHYLPREDNISOLONE SODIUM SUCCINATE 125 MG/2ML
125 INJECTION, POWDER, LYOPHILIZED, FOR SOLUTION INTRAMUSCULAR; INTRAVENOUS
Status: DISCONTINUED | OUTPATIENT
Start: 2023-08-22 | End: 2023-08-23 | Stop reason: HOSPADM

## 2023-08-22 RX ORDER — DIPHENHYDRAMINE HYDROCHLORIDE 50 MG/ML
25-50 INJECTION INTRAMUSCULAR; INTRAVENOUS
Status: DISCONTINUED | OUTPATIENT
Start: 2023-08-22 | End: 2023-08-23 | Stop reason: HOSPADM

## 2023-08-22 RX ORDER — ONDANSETRON 2 MG/ML
4 INJECTION INTRAMUSCULAR; INTRAVENOUS
Status: DISCONTINUED | OUTPATIENT
Start: 2023-08-22 | End: 2023-08-23 | Stop reason: HOSPADM

## 2023-08-22 RX ORDER — REGADENOSON 0.08 MG/ML
0.4 INJECTION, SOLUTION INTRAVENOUS ONCE
Status: DISCONTINUED | OUTPATIENT
Start: 2023-08-22 | End: 2023-08-23 | Stop reason: HOSPADM

## 2023-08-22 RX ORDER — DIPHENHYDRAMINE HCL 25 MG
25 CAPSULE ORAL
Status: DISCONTINUED | OUTPATIENT
Start: 2023-08-22 | End: 2023-08-23 | Stop reason: HOSPADM

## 2023-08-22 RX ORDER — SACUBITRIL AND VALSARTAN 24; 26 MG/1; MG/1
1 TABLET, FILM COATED ORAL 2 TIMES DAILY
Qty: 180 TABLET | Refills: 3 | Status: SHIPPED | OUTPATIENT
Start: 2023-08-22 | End: 2023-09-06

## 2023-08-22 RX ORDER — DIAZEPAM 5 MG
5 TABLET ORAL EVERY 30 MIN PRN
Status: DISCONTINUED | OUTPATIENT
Start: 2023-08-22 | End: 2023-08-23 | Stop reason: HOSPADM

## 2023-08-22 RX ORDER — CAFFEINE CITRATE 20 MG/ML
60 SOLUTION INTRAVENOUS
Status: DISCONTINUED | OUTPATIENT
Start: 2023-08-22 | End: 2023-08-23 | Stop reason: HOSPADM

## 2023-08-22 RX ORDER — GADOBUTROL 604.72 MG/ML
15 INJECTION INTRAVENOUS ONCE
Status: COMPLETED | OUTPATIENT
Start: 2023-08-22 | End: 2023-08-22

## 2023-08-22 RX ORDER — SACUBITRIL AND VALSARTAN 24; 26 MG/1; MG/1
1 TABLET, FILM COATED ORAL 2 TIMES DAILY
Qty: 180 TABLET | Refills: 3 | Status: SHIPPED | OUTPATIENT
Start: 2023-08-22 | End: 2023-08-22

## 2023-08-22 RX ORDER — AMINOPHYLLINE 25 MG/ML
100 INJECTION, SOLUTION INTRAVENOUS ONCE
Status: DISCONTINUED | OUTPATIENT
Start: 2023-08-22 | End: 2023-08-23 | Stop reason: HOSPADM

## 2023-08-22 RX ADMIN — GADOBUTROL 15 ML: 604.72 INJECTION INTRAVENOUS at 12:06

## 2023-08-22 NOTE — PROGRESS NOTES
Dr. Alejo signed Rx's for both Jardiance and Entresto.      Faxed to Deanna Baum.    Nadia Victoria RN BSN   [place], [place]  SOCORRO. Clinic Care Coordinator  08/22/23, 2:38 PM

## 2023-08-22 NOTE — PROGRESS NOTES
Printed prescriptions for Dr Alejo to sign as Aybike is not in clinic.     VIN RhodesN, RN 1:20 PM 08/22/23

## 2023-08-29 ENCOUNTER — TELEPHONE (OUTPATIENT)
Dept: CARDIOLOGY | Facility: CLINIC | Age: 78
End: 2023-08-29

## 2023-08-29 ENCOUNTER — LAB (OUTPATIENT)
Dept: LAB | Facility: CLINIC | Age: 78
End: 2023-08-29
Payer: COMMERCIAL

## 2023-08-29 ENCOUNTER — OFFICE VISIT (OUTPATIENT)
Dept: CARDIOLOGY | Facility: CLINIC | Age: 78
End: 2023-08-29
Payer: COMMERCIAL

## 2023-08-29 VITALS
BODY MASS INDEX: 40.94 KG/M2 | DIASTOLIC BLOOD PRESSURE: 78 MMHG | HEIGHT: 65 IN | HEART RATE: 96 BPM | SYSTOLIC BLOOD PRESSURE: 104 MMHG | OXYGEN SATURATION: 97 % | WEIGHT: 245.7 LBS

## 2023-08-29 DIAGNOSIS — I48.0 PAROXYSMAL ATRIAL FIBRILLATION (H): ICD-10-CM

## 2023-08-29 DIAGNOSIS — I50.23 ACUTE ON CHRONIC SYSTOLIC HEART FAILURE (H): ICD-10-CM

## 2023-08-29 DIAGNOSIS — I50.21 ACUTE SYSTOLIC HEART FAILURE (H): ICD-10-CM

## 2023-08-29 LAB
ANION GAP SERPL CALCULATED.3IONS-SCNC: 11 MMOL/L (ref 7–15)
BUN SERPL-MCNC: 17.4 MG/DL (ref 8–23)
CALCIUM SERPL-MCNC: 9.6 MG/DL (ref 8.8–10.2)
CHLORIDE SERPL-SCNC: 102 MMOL/L (ref 98–107)
CREAT SERPL-MCNC: 1 MG/DL (ref 0.51–0.95)
DEPRECATED HCO3 PLAS-SCNC: 23 MMOL/L (ref 22–29)
GFR SERPL CREATININE-BSD FRML MDRD: 57 ML/MIN/1.73M2
GLUCOSE SERPL-MCNC: 115 MG/DL (ref 70–99)
POTASSIUM SERPL-SCNC: 4.5 MMOL/L (ref 3.4–5.3)
SODIUM SERPL-SCNC: 136 MMOL/L (ref 136–145)

## 2023-08-29 PROCEDURE — 36415 COLL VENOUS BLD VENIPUNCTURE: CPT | Performed by: PHYSICIAN ASSISTANT

## 2023-08-29 PROCEDURE — 99215 OFFICE O/P EST HI 40 MIN: CPT | Performed by: PHYSICIAN ASSISTANT

## 2023-08-29 PROCEDURE — 80048 BASIC METABOLIC PNL TOTAL CA: CPT | Performed by: PHYSICIAN ASSISTANT

## 2023-08-29 RX ORDER — METOPROLOL SUCCINATE 50 MG/1
TABLET, EXTENDED RELEASE ORAL
Qty: 360 TABLET | Refills: 0 | Status: SHIPPED | OUTPATIENT
Start: 2023-08-29 | End: 2023-10-25

## 2023-08-29 RX ORDER — METOPROLOL SUCCINATE 100 MG/1
TABLET, EXTENDED RELEASE ORAL
Qty: 180 TABLET | Refills: 3 | Status: SHIPPED | OUTPATIENT
Start: 2023-08-29 | End: 2023-08-29

## 2023-08-29 NOTE — PATIENT INSTRUCTIONS
Today, we discussed the following:  Medication changes:   INCREASE metoprolol to 100 mg twice daily.  Will plan to stop Losartan and torsemide, and get you on Entresto + jardiance/farxiga after the financial assistance information comes through. You will need a non-fasting blood draw two weeks after that change is made.   Follow up: With me in 3 months, after repeat echocardiogram.     Please, remember to continue the followin.  Weigh yourself daily. Call if your weight is up > than 2 pounds in one day, or 5 pounds in one week; if you feel more short of breath or have worsening swelling in your legs or abdomen.  2.  Eat a low sodium diet (less than 2,000mg or 2g daily). If you eat less salt, you will retain less fluid.   3.  Avoid alcohol, as this can worsen heart failure.   4.  Avoid NSAIDs as able (For example, Ibuprofen / aleve / advil / naprosen / diclofenac).    Thank you for your visit with the C.O.R.E. Clinic today.   CORE stands for Cardiomyopathy Optimization Rehabilitation and Education. The CORE clinic will teach and help you to manage your heart failure and keep you out of the hospital.    Call C.O.R.E. nurse for any questions or concerns Mon-Fri 8am-4pm  [phone]: Nurse number   [phone]: After Hours Phone Number       Component      Latest Ref Rng 2023  9:08 AM 2023  11:45 AM   Sodium      136 - 145 mmol/L 136  136    Potassium      3.4 - 5.3 mmol/L 4.2  4.5    Chloride      98 - 107 mmol/L 103  102    Carbon Dioxide (CO2)      22 - 29 mmol/L 22  23    Anion Gap      7 - 15 mmol/L 11  11    Urea Nitrogen      8.0 - 23.0 mg/dL 18.3  17.4    Creatinine      0.51 - 0.95 mg/dL 0.88  1.00 (H)    Calcium      8.8 - 10.2 mg/dL 9.5  9.6    Glucose      70 - 99 mg/dL 132 (H)  115 (H)    GFR Estimate      >60 mL/min/1.73m2 67  57 (L)       Legend:  (H) High  (L) Low

## 2023-08-29 NOTE — PROGRESS NOTES
JARDIANCE   Re-faxed application for Jardiance to [facility] Patient Assistance at [phone]  Signed patient portion  Signed provider portion  Singed prescription  Medication list with Jardiance dose: 10 mg tablet - Take one tablet by mouth once daily.   Copy of [employer] and Medicare cards  Proof of income            ENTRESTO   Re-faxed application for Entresto to [facility] Patient Assistance at [phone]   Signed patient portion  Signed provider portion  Singed prescription  Medication list with Entresto 24-26 mg dose: 24-26 mg tablet. - Take one tablet by mouth twice daily.   Copy of [employer] and Medicare cards  Proof of income              NOE Mir would like to be updated on status of pt assistance.  If Fanny is approved and is able to start Jardiance and Entresto - Fanny will need a BMP and an OV 2 weeks after starting medications.  If not approved, follow-up in 3 months      Future Appointments   Date Time Provider Department Center   11/20/2023 10:45 AM [facility]   11/22/2023 10:15 AM Adrian Andrea MD [facility]   11/29/2023  1:10 PM Shyann Abarca PA-C [facility]         MIRTHA Rhodes, RN 1:52 PM 08/29/23

## 2023-08-29 NOTE — PROGRESS NOTES
[facility]- C.OVegaRVegaE Clinic        Routing to Deanna Baum for follow up.    Gisela Manuel, RN, BSN  [facility] [place], [place]  TIMOTHYOSEGUNDO. Clinic Care Coordinator  August 29, 2023 11:25 AM    Future Appointments   Date Time Provider Department Center   8/29/2023 12:15 PM [facility] [place] RID   8/29/2023  1:10 PM [facility]   11/22/2023 10:15 AM [facility]

## 2023-08-29 NOTE — PROGRESS NOTES
Fax received from [employer] for approval for Entresto. Update to Shyann.            MIRTHA Rhodes, RN 4:27 PM 08/29/23

## 2023-08-29 NOTE — TELEPHONE ENCOUNTER
Health Call Center    Phone Message    May a detailed message be left on voicemail: yes     Reason for Call: Medication Question or concern regarding medication   Prescription Clarification  Name of Medication: metoprolol succinate ER (TOPROL XL) 100 MG 24 hr tablet   Prescribing Provider: Shyann Abarca     What on the order needs clarification? Merritt with [facility] called wanting to speak with someone on the care team to get clarifications on the dosage and directions. Dosage says 100mg, and directions says two take 2 100mg tablets in the morning, then 1 50mg tablet at night. Please call back at [phone] to further discuss.      Action Taken: Other: Cardiology    Travel Screening: Not Applicable    Thank you!  [facility]

## 2023-08-29 NOTE — TELEPHONE ENCOUNTER
Reviewed CORE OV note and reviewed with Shyann:     INCREASE metoprolol to 100 mg twice daily.       Updated instructions on medication list.  Call to pharmacy with update instructions.     Updated Pearl on instructions.       Future Appointments   Date Time Provider Department Center   11/20/2023 10:45 AM [facility]   11/29/2023  1:10 PM [facility]         MIRTHA Rhodes, RN 4:45 PM 08/29/23

## 2023-08-29 NOTE — LETTER
8/29/2023    [facility]  [address] [address]    RE: Fanny Myers       Dear Colleague,     I had the pleasure of seeing Fanny Myers in the [facility].    Cardiology C.O.R.E (heart failure specialty) Clinic Progress Note    Fanny Myers MRN# [id number]   YOB: 1945 Age: 78 year old     Primary cardiology team: Dr. Craig         Assessment and Plan     In summary, Fanny Myers presents today for a CORE clinic follow up visit after the up-titration of metoprolol.     HFrEF  EF: 33% (cMRI)  Etiology: Mixed (ischemic + likely tachy-mediated)    PAF.   Toprol recently uptitrated for average heart rate of 91 bpm on Holter monitor. HR still higher than ideal today.   Anticoagulated.     CAD, s/p PCI to D1 and distal LCX on 5/30/23.   Residual high-grade RPL lesion, was planned for staged PCI but this was cancelled as Dr. Flores and  -- Rafa did not feel it was severe enough lesion and patient was asymptomatic so medical therapy was recommended.    Denies angina.    HTN.  Marginal.     Plan:  INCREASE Toprol to 100 mg BID.  Working on getting her on SGLT2 inhibitor and Entresto through patient assistance.  Of course when she starts these, she will stop losartan and torsemide.  She would need a BMP 2 weeks after the medication change.  We will repeat an echocardiogram in another 3 months, after she has been more optimally rate controlled.  If EF remains less than or equal to 35% at that time, she will be referred for ICD implantation. She is already relaying hesitance to move forward with ICD.       MARY L[facility] - Heart Clinic         History of Presenting Illness     Fanny Myers is a pleasant 78 year old patient with a pertinent history of the following -   Chronic HFrEF, EF 25-30%. Mixed etiology (ischemic + AF).   CAD, s/p PCI to D1 and distal LCX on 5/30/23. Residual high-grade RPL lesion, was planned for  "staged PCI but this was cancelled as Dr. Flores and  -- Rafa did not feel it was severe enough lesion and patient was asymptomatic so medical therapy was recommended.    PAF, on rate control strategy with metoprolol, anticoagulated with Eliquis.  HTN  JOANNE suspected  Hypothyroidism    Today, Fanny presents after her Toprol was increased d/t an average HR of 91 bpm noted on recent holter monitor.  She additionally had a cardiac MRI showing fairly stable LVEF of 33%, with LGE showing previous infarction in the diagonal distribution, overall consistent with a mixed cardiomyopathy.    Fanny states \"I really haven't felt bd through this whole process!\" She gets generalized fatigue. Patient denies chest pain, shortness of breath, PND, orthopnea, edema, claudication, palpitations, near syncope or syncope. She lives alone and uses a walker for security. Her daughter (who is an OT) and son in law live nearby and help with her laundry and groceries. She's a retired teacher. Of note, SGLT2i therapy/Entresto will be unaffordable for her - currently in process of applying for financial assistance.   BP marginal, HR upper 90's. Weight is overall down 40 lbs from June.          Review of Systems     12-pt ROS is negative except for as noted in the HPI.          Physical Exam     Vitals: /78 (BP Location: Right arm, Patient Position: Sitting, Cuff Size: Adult Large)   Pulse 96   Ht 1.651 m (5' 5\")   Wt 111.4 kg (245 lb 11.2 oz)   SpO2 97%   BMI 40.89 kg/m    Wt Readings from Last 10 Encounters:   08/29/23 111.4 kg (245 lb 11.2 oz)   07/20/23 113.9 kg (251 lb)   06/06/23 122.2 kg (269 lb 8 oz)   06/01/23 129 kg (284 lb 6.4 oz)       Constitutional:  Patient is pleasant, alert, cooperative, and in NAD.  HEENT:  NCAT. PERRLA. EOM's intact.   Neck:  CVP appears normal. No carotid bruits.   Pulmonary: Normal respiratory effort. CTAB.   Cardiac: Irregularly irregular, variable S1,, no S3/S4, no murmur or rub.   Abdomen: "  Non-tender abdomen, no hepatosplenomegaly appreciated.   Vascular: Pulses in the upper and lower extremities are 2+ and equal bilaterally.  Extremities: Trace edema with chronic venous stasis changes.  Skin:  No rashes or lesions appreciated.   Neurological:  No gross motor or sensory deficits.   Psych: Appropriate affect.          Data   Labs reviewed:  Recent Labs   Lab Test 06/06/23  1225 06/05/23  0545 05/30/23  1731 05/26/23  0553 05/25/23  1610   LDL  --   --  47 38  --    HDL  --   --  70 61  --    NHDL  --   --  65 54  --    CHOL  --   --  135 115  --    TRIG  --   --  91 82  --    TSH  --   --   --   --  7.02*   NTBNP 2,484* 2,544*  --   --   --        Lab Results   Component Value Date    WBC 7.4 06/21/2023    WBC 14.2 (H) 04/27/2013    RBC 4.86 06/21/2023    RBC 4.46 04/27/2013    HGB 12.8 06/21/2023    HGB 14.0 04/27/2013    HCT 42.8 06/21/2023    HCT 42.1 04/27/2013    MCV 88 06/21/2023    MCV 94 04/27/2013    MCH 26.3 (L) 06/21/2023    MCH 31.4 04/27/2013    MCHC 29.9 (L) 06/21/2023    MCHC 33.3 04/27/2013    RDW 17.2 (H) 06/21/2023    RDW 12.7 04/27/2013     06/21/2023     04/27/2013       Lab Results   Component Value Date     08/29/2023     04/27/2013    POTASSIUM 4.5 08/29/2023    POTASSIUM 4.5 04/27/2013    CHLORIDE 102 08/29/2023    CHLORIDE 101 04/27/2013    CO2 23 08/29/2023    CO2 29 04/27/2013    ANIONGAP 11 08/29/2023    ANIONGAP 10.2 04/27/2013     (H) 08/29/2023     (H) 04/27/2013    BUN 17.4 08/29/2023    BUN 19 04/27/2013    CR 1.00 (H) 08/29/2023    CR 1.21 (H) 04/27/2013    GFRESTIMATED 57 (L) 08/29/2023    GFRESTIMATED 44 (L) 04/27/2013    GFRESTBLACK 54 (L) 04/27/2013    DANIE 9.6 08/29/2023    DANIE 9.5 04/27/2013      Lab Results   Component Value Date    AST 33 05/25/2023    ALT 26 05/25/2023       No results found for: A1C    Lab Results   Component Value Date    INR 1.15 06/21/2023            Problem List     Patient Active Problem List    Diagnosis    Paroxysmal atrial fibrillation (H)    Acute congestive heart failure, unspecified heart failure type (H)    Elevated troponin    Type 2 MI (myocardial infarction) (H)    Acute systolic heart failure, due to tachyarrhythmia and ischemic CMO    Status post insertion of drug eluting coronary artery stents (to D1 and distal L Cx)    Primary hypertension    JOANNE (obstructive sleep apnea), suspected            Medications     Current Outpatient Medications   Medication Sig Dispense Refill    apixaban ANTICOAGULANT (ELIQUIS) 5 MG tablet Take 1 tablet (5 mg) by mouth 2 times daily      atorvastatin (LIPITOR) 40 MG tablet Take 1 tablet (40 mg) by mouth daily 90 tablet 3    clopidogrel (PLAVIX) 75 MG tablet Take 1 tablet (75 mg) by mouth daily      famotidine (PEPCID) 20 MG tablet Take 1 tablet (20 mg) by mouth 2 times daily (Patient taking differently: Take 20 mg by mouth 2 times daily as needed)      levothyroxine (SYNTHROID/LEVOTHROID) 112 MCG tablet Take 112 mcg by mouth every morning (before breakfast)      losartan (COZAAR) 25 MG tablet Take 0.5 tablets (12.5 mg) by mouth every evening      metoprolol succinate ER (TOPROL XL) 50 MG 24 hr tablet Take 2 tablets (100mg) by mouth in the AM and 1 tablet (50mg) by mouth in the  tablet 3    torsemide (DEMADEX) 10 MG tablet Take 1 tablet (10 mg) by mouth daily      empagliflozin (JARDIANCE) 10 MG TABS tablet Take 1 tablet (10 mg) by mouth daily NOT CURRENTLY TAKING- Added to med list for patient assistance work up only. 8/22/23 (Patient not taking: Reported on 8/29/2023) 90 tablet 3    sacubitril-valsartan (ENTRESTO) 24-26 MG per tablet Take 1 tablet by mouth 2 times daily NOT CURRENTLY TAKING- Added to med list for patient assistance work up only. 8/22/23 (Patient not taking: Reported on 8/29/2023) 180 tablet 3            Past Medical History   History reviewed. No pertinent past medical history.  Past Surgical History:   Procedure Laterality Date     APPENDECTOMY      CV CORONARY ANGIOGRAM N/A 5/30/2023    Procedure: Coronary Angiogram;  Surgeon: Bryant Simental MD;  Location: [facility]    CV PCI N/A 5/30/2023    Procedure: Percutaneous Coronary Intervention;  Surgeon: Bryant Simental MD;  Location: [facility]    ENT SURGERY      GYN SURGERY       Family History   Problem Relation Age of Onset    Heart Disease Mother         Per pt something heart related- unsure what    Heart Disease Father         Per pt something heart related- unsure what     Social History     Socioeconomic History    Marital status:      Spouse name: Not on file    Number of children: Not on file    Years of education: Not on file    Highest education level: Not on file   Occupational History    Not on file   Tobacco Use    Smoking status: Never    Smokeless tobacco: Never   Substance and Sexual Activity    Alcohol use: No    Drug use: No    Sexual activity: Not on file   Other Topics Concern    Not on file   Social History Narrative    Not on file     Social Determinants of Health     Financial Resource Strain: Not on file   Food Insecurity: Not on file   Transportation Needs: Not on file   Physical Activity: Not on file   Stress: Not on file   Social Connections: Not on file   Intimate Partner Violence: Not on file   Housing Stability: Not on file            Allergies   Codeine sulfate and Penicillins    Today's clinic visit entailed:  Review of the result(s) of each unique test - BMP, cardiac MRI  Prescription drug management  I spent a total of 40 minutes on the day of the visit.   Time spent by me doing chart review, history and exam, documentation and further activities per the note  Provider  Link to [facility] Help Grid     The level of medical decision making during this visit was of moderate complexity.      Thank you for allowing me to participate in the care of your patient.      Sincerely,     Shyann Abarca PA-C     [facility]  [facility]  cc:   Maura Galeana PA-C  [address]  [place] [address]

## 2023-08-29 NOTE — PROGRESS NOTES
Cardiology C.O.R.E (heart failure specialty) Clinic Progress Note    Fanny Myers MRN# [id number]   YOB: 1945 Age: 78 year old     Primary cardiology team: Dr. Craig         Assessment and Plan     In summary, Fanny Myers presents today for a [facility] clinic follow up visit after the up-titration of metoprolol.     HFrEF  EF: 33% (cMRI)  Etiology: Mixed (ischemic + likely tachy-mediated)    PAF.   Toprol recently uptitrated for average heart rate of 91 bpm on Holter monitor. HR still higher than ideal today.   Anticoagulated.     CAD, s/p PCI to D1 and distal LCX on 5/30/23.   Residual high-grade RPL lesion, was planned for staged PCI but this was cancelled as Dr. Flores and  -- Rafa did not feel it was severe enough lesion and patient was asymptomatic so medical therapy was recommended.    Denies angina.    HTN.  Marginal.     Plan:  INCREASE Toprol to 100 mg BID.  She has a reaction to adhesives, so I am going to defer getting another Holter monitor on her after this change.  I have asked her to request her daughter to assist in checking her heart rate intermittently, they will contact us if this remains greater than 90 bpm on average.  Working on getting her on SGLT2 inhibitor and Entresto through patient assistance.  Of course when she starts these, she will stop losartan and torsemide.  She would need a BMP 2 weeks after the medication change.  We will repeat an echocardiogram in another 3 months, after she has been more optimally rate controlled.  If EF remains less than or equal to 35% at that time, she will be referred for ICD implantation. She is already relaying hesitance to move forward with ICD.       [facility]         History of Presenting Illness     Fanny Myers is a pleasant 78 year old patient with a pertinent history of the following -   Chronic HFrEF, EF 25-30%. Mixed etiology (ischemic + AF).   CAD, s/p PCI to D1 and distal  "LCX on 5/30/23. Residual high-grade RPL lesion, was planned for staged PCI but this was cancelled as Dr. Flores and  -- Ho did not feel it was severe enough lesion and patient was asymptomatic so medical therapy was recommended.    PAF, on rate control strategy with metoprolol, anticoagulated with Eliquis.  HTN  JOANNE suspected  Hypothyroidism    Today, Fanny presents after her Toprol was increased d/t an average HR of 91 bpm noted on recent holter monitor.  She additionally had a cardiac MRI showing fairly stable LVEF of 33%, with LGE showing previous infarction in the diagonal distribution, overall consistent with a mixed cardiomyopathy.    Fanny states \"I really haven't felt bd through this whole process!\" She gets generalized fatigue. Patient denies chest pain, shortness of breath, PND, orthopnea, edema, claudication, palpitations, near syncope or syncope. She lives alone and uses a walker for security. Her daughter (who is an OT) and son in law live nearby and help with her laundry and groceries. She's a retired teacher. Of note, SGLT2i therapy/Entresto will be unaffordable for her - currently in process of applying for financial assistance.   BP marginal, HR upper 90's. Weight is overall down 40 lbs from June.          Review of Systems     12-pt ROS is negative except for as noted in the HPI.          Physical Exam     Vitals: /78 (BP Location: Right arm, Patient Position: Sitting, Cuff Size: Adult Large)   Pulse 96   Ht 1.651 m (5' 5\")   Wt 111.4 kg (245 lb 11.2 oz)   SpO2 97%   BMI 40.89 kg/m    Wt Readings from Last 10 Encounters:   08/29/23 111.4 kg (245 lb 11.2 oz)   07/20/23 113.9 kg (251 lb)   06/06/23 122.2 kg (269 lb 8 oz)   06/01/23 129 kg (284 lb 6.4 oz)       Constitutional:  Patient is pleasant, alert, cooperative, and in NAD.  HEENT:  NCAT. PERRLA. EOM's intact.   Neck:  CVP appears normal. No carotid bruits.   Pulmonary: Normal respiratory effort. CTAB.   Cardiac: Irregularly " irregular, variable S1,, no S3/S4, no murmur or rub.   Abdomen:  Non-tender abdomen, no hepatosplenomegaly appreciated.   Vascular: Pulses in the upper and lower extremities are 2+ and equal bilaterally.  Extremities: Trace edema with chronic venous stasis changes.  Skin:  No rashes or lesions appreciated.   Neurological:  No gross motor or sensory deficits.   Psych: Appropriate affect.          Data   Labs reviewed:  Recent Labs   Lab Test 06/06/23  1225 06/05/23  0545 05/30/23  1731 05/26/23  0553 05/25/23  1610   LDL  --   --  47 38  --    HDL  --   --  70 61  --    NHDL  --   --  65 54  --    CHOL  --   --  135 115  --    TRIG  --   --  91 82  --    TSH  --   --   --   --  7.02*   NTBNP 2,484* 2,544*  --   --   --        Lab Results   Component Value Date    WBC 7.4 06/21/2023    WBC 14.2 (H) 04/27/2013    RBC 4.86 06/21/2023    RBC 4.46 04/27/2013    HGB 12.8 06/21/2023    HGB 14.0 04/27/2013    HCT 42.8 06/21/2023    HCT 42.1 04/27/2013    MCV 88 06/21/2023    MCV 94 04/27/2013    MCH 26.3 (L) 06/21/2023    MCH 31.4 04/27/2013    MCHC 29.9 (L) 06/21/2023    MCHC 33.3 04/27/2013    RDW 17.2 (H) 06/21/2023    RDW 12.7 04/27/2013     06/21/2023     04/27/2013       Lab Results   Component Value Date     08/29/2023     04/27/2013    POTASSIUM 4.5 08/29/2023    POTASSIUM 4.5 04/27/2013    CHLORIDE 102 08/29/2023    CHLORIDE 101 04/27/2013    CO2 23 08/29/2023    CO2 29 04/27/2013    ANIONGAP 11 08/29/2023    ANIONGAP 10.2 04/27/2013     (H) 08/29/2023     (H) 04/27/2013    BUN 17.4 08/29/2023    BUN 19 04/27/2013    CR 1.00 (H) 08/29/2023    CR 1.21 (H) 04/27/2013    GFRESTIMATED 57 (L) 08/29/2023    GFRESTIMATED 44 (L) 04/27/2013    GFRESTBLACK 54 (L) 04/27/2013    DANIE 9.6 08/29/2023    DANIE 9.5 04/27/2013      Lab Results   Component Value Date    AST 33 05/25/2023    ALT 26 05/25/2023       No results found for: A1C    Lab Results   Component Value Date    INR 1.15  06/21/2023            Problem List     Patient Active Problem List   Diagnosis    Paroxysmal atrial fibrillation (H)    Acute congestive heart failure, unspecified heart failure type (H)    Elevated troponin    Type 2 MI (myocardial infarction) (H)    Acute systolic heart failure, due to tachyarrhythmia and ischemic CMO    Status post insertion of drug eluting coronary artery stents (to D1 and distal L Cx)    Primary hypertension    JOANNE (obstructive sleep apnea), suspected            Medications     Current Outpatient Medications   Medication Sig Dispense Refill    apixaban ANTICOAGULANT (ELIQUIS) 5 MG tablet Take 1 tablet (5 mg) by mouth 2 times daily      atorvastatin (LIPITOR) 40 MG tablet Take 1 tablet (40 mg) by mouth daily 90 tablet 3    clopidogrel (PLAVIX) 75 MG tablet Take 1 tablet (75 mg) by mouth daily      famotidine (PEPCID) 20 MG tablet Take 1 tablet (20 mg) by mouth 2 times daily (Patient taking differently: Take 20 mg by mouth 2 times daily as needed)      levothyroxine (SYNTHROID/LEVOTHROID) 112 MCG tablet Take 112 mcg by mouth every morning (before breakfast)      losartan (COZAAR) 25 MG tablet Take 0.5 tablets (12.5 mg) by mouth every evening      metoprolol succinate ER (TOPROL XL) 50 MG 24 hr tablet Take 2 tablets (100mg) by mouth in the AM and 1 tablet (50mg) by mouth in the  tablet 3    torsemide (DEMADEX) 10 MG tablet Take 1 tablet (10 mg) by mouth daily      empagliflozin (JARDIANCE) 10 MG TABS tablet Take 1 tablet (10 mg) by mouth daily NOT CURRENTLY TAKING- Added to med list for patient assistance work up only. 8/22/23 (Patient not taking: Reported on 8/29/2023) 90 tablet 3    sacubitril-valsartan (ENTRESTO) 24-26 MG per tablet Take 1 tablet by mouth 2 times daily NOT CURRENTLY TAKING- Added to med list for patient assistance work up only. 8/22/23 (Patient not taking: Reported on 8/29/2023) 180 tablet 3            Past Medical History   History reviewed. No pertinent past medical  history.  Past Surgical History:   Procedure Laterality Date    APPENDECTOMY      CV CORONARY ANGIOGRAM N/A 5/30/2023    Procedure: Coronary Angiogram;  Surgeon: Bryant Simental MD;  Location: [facility]    CV PCI N/A 5/30/2023    Procedure: Percutaneous Coronary Intervention;  Surgeon: Bryant Simental MD;  Location: [facility]    ENT SURGERY      GYN SURGERY       Family History   Problem Relation Age of Onset    Heart Disease Mother         Per pt something heart related- unsure what    Heart Disease Father         Per pt something heart related- unsure what     Social History     Socioeconomic History    Marital status:      Spouse name: Not on file    Number of children: Not on file    Years of education: Not on file    Highest education level: Not on file   Occupational History    Not on file   Tobacco Use    Smoking status: Never    Smokeless tobacco: Never   Substance and Sexual Activity    Alcohol use: No    Drug use: No    Sexual activity: Not on file   Other Topics Concern    Not on file   Social History Narrative    Not on file     Social Determinants of Health     Financial Resource Strain: Not on file   Food Insecurity: Not on file   Transportation Needs: Not on file   Physical Activity: Not on file   Stress: Not on file   Social Connections: Not on file   Intimate Partner Violence: Not on file   Housing Stability: Not on file            Allergies   Codeine sulfate and Penicillins    Today's clinic visit entailed:  Review of the result(s) of each unique test - BMP, cardiac MRI  Prescription drug management  I spent a total of 40 minutes on the day of the visit.   Time spent by me doing chart review, history and exam, documentation and further activities per the note  Provider  Link to MDM Help Grid     The level of medical decision making during this visit was of moderate complexity.

## 2023-08-31 NOTE — PROGRESS NOTES
"Received update from Deanna Baum LPN regarding [facility] patient assistance for Jardiance. Pearl has been approved from 8/31/2023 - 12/31/2023.    Plan 8/30/2023 per NOE Mir:  Once approved start Entresto 24-26 mg BID and jardiance 10 mg daily, stop Losartan and torsemide. BMP two weeks later and visit with me. Thanks!       Pearl left voicemail on [facility] line requesting to call back to discuss medications.     Returned call.  We did discuss increase in metoprolol from 8/29 OV as she was confused \"about the plan with these other medications\". Explained to her that Jardiance and Entresto are in addition to her current medications. She expressed understanding and will continue to rake Metoprolol Succinate  mg twice daily.   We did discuss Entresto and Jardiance. Discussed how they work and benefits in heart failure.   We also discussed that we will have her stop torsemide and losartan prior to starting Entresto with BMP in 2 weeks. She became confused and overwhelmed.   To make it easier for her, I did ask her to call [facility] and [facility] to set up medication deliveries and call [facility] when Entresto arrives. She expressed understanding, will call [facility] and [facility], will continue to take her current regimen and call [facility] when Entresto arrives.    No lab orders placed or appts made until medications received and started.      Belkys Cao, VINN, RN 4:49 PM 08/31/23            "

## 2023-09-06 ENCOUNTER — TELEPHONE (OUTPATIENT)
Dept: CARDIOLOGY | Facility: CLINIC | Age: 78
End: 2023-09-06
Payer: COMMERCIAL

## 2023-09-06 DIAGNOSIS — I50.21 ACUTE SYSTOLIC HEART FAILURE (H): Primary | ICD-10-CM

## 2023-09-06 DIAGNOSIS — I50.23 ACUTE ON CHRONIC SYSTOLIC HEART FAILURE (H): ICD-10-CM

## 2023-09-06 RX ORDER — SACUBITRIL AND VALSARTAN 24; 26 MG/1; MG/1
1 TABLET, FILM COATED ORAL 2 TIMES DAILY
Qty: 180 TABLET | Refills: 3 | Status: SHIPPED | DISCHARGE
Start: 2023-09-07 | End: 2024-01-04

## 2023-09-06 NOTE — TELEPHONE ENCOUNTER
Pearl left voice mail on C[facility] line stating Entresto has arrived.      Returned call to discuss transitioning from losartan to Entresto and plan:   Once approved start Entresto 24-26 mg BID and jardiance 10 mg daily, stop Losartan and torsemide. BMP two weeks later and visit with me. Thanks!       We did discuss 36 hour wash out period from Losartan to Entresto. Last dose of Losartan was 9/5/2023 at 8 PM   She takes medication at 8 AM and 8 PM.    She will take out Losartan and torsemide from her pill box.  I did ask her to put losartan and torsemide and to not discard them in case she does not tolerate new medications.     Jardiance has not arrived yet. She will call [facility] when it does arrive.    She expressed understanding.     FU appts made for:  9/26 at 2 PM for non fasting lab (BMP)  9/26 at 310 PM  [facility] OV with Shyann      FU orders placed.     Update to Shyann.    Removed Losartan and torsemide from medication list and changed Entresto start date to 9/7      Belkys Cao, MIRTHA, RN 1:11 PM 09/06/23

## 2023-09-06 NOTE — TELEPHONE ENCOUNTER
Called Pearl to see if Entresto or Jardiance arrived neither came. She will call [facility] when she receives them.        She will need instructions on transitioning to Entresto and fu lab appt.     Plan 8/30/2023 per Shyann Abarca PAC:  Once approved start Entresto 24-26 mg BID and jardiance 10 mg daily, stop Losartan and torsemide. BMP two weeks later and visit with me. Thanks!              Future Appointments   Date Time Provider Department Center   11/20/2023 10:45 AM [email or web address] [facility]   11/29/2023  1:10 PM Shyann Abarca PA-C [facility] PSA CLIN            Belkys Cao, VINN, RN 10:28 AM 09/06/23

## 2023-09-06 NOTE — PROGRESS NOTES
Called Pearl to see if Entresto or Jardiance arrived neither came. She will call [facility] when she receives them.      She will need instructions on transitioning to Entresto and fu lab appt.       Future Appointments   Date Time Provider Department Center   11/20/2023 10:45 AM [email or web address] [facility]   11/29/2023  1:10 PM Shyann Abarca PA-C [facility] PSA CLIN         Belkys Cao, VINN, RN 10:28 AM 09/06/23

## 2023-09-07 NOTE — TELEPHONE ENCOUNTER
Pearl called stating that Franco arrived as well. She will start tomorrow.   FU appt and labs ordered and scheduled. She is aware.            Future Appointments   Date Time Provider Department Center   9/26/2023  2:00 PM RU LAB [facility]   9/26/2023  3:10 PM Shyann Abarca PA-C [facility] PSA CLIN   11/20/2023 10:45 AM [email or web address] [facility] RSCC   11/29/2023  1:10 PM Shyann Abarca PA-C [facility] PSA CLIN     Belkys Cao, MIRTHA, RN 9:13 AM 09/07/23

## 2023-09-26 ENCOUNTER — OFFICE VISIT (OUTPATIENT)
Dept: CARDIOLOGY | Facility: CLINIC | Age: 78
End: 2023-09-26
Payer: COMMERCIAL

## 2023-09-26 ENCOUNTER — LAB (OUTPATIENT)
Dept: LAB | Facility: CLINIC | Age: 78
End: 2023-09-26
Payer: COMMERCIAL

## 2023-09-26 VITALS
WEIGHT: 245.7 LBS | DIASTOLIC BLOOD PRESSURE: 72 MMHG | SYSTOLIC BLOOD PRESSURE: 94 MMHG | BODY MASS INDEX: 40.94 KG/M2 | OXYGEN SATURATION: 97 % | HEART RATE: 80 BPM | HEIGHT: 65 IN

## 2023-09-26 DIAGNOSIS — I50.23 ACUTE ON CHRONIC SYSTOLIC HEART FAILURE (H): ICD-10-CM

## 2023-09-26 DIAGNOSIS — I50.21 ACUTE SYSTOLIC HEART FAILURE (H): ICD-10-CM

## 2023-09-26 LAB
ANION GAP SERPL CALCULATED.3IONS-SCNC: 10 MMOL/L (ref 7–15)
BUN SERPL-MCNC: 15.7 MG/DL (ref 8–23)
CALCIUM SERPL-MCNC: 9.8 MG/DL (ref 8.8–10.2)
CHLORIDE SERPL-SCNC: 102 MMOL/L (ref 98–107)
CREAT SERPL-MCNC: 0.95 MG/DL (ref 0.51–0.95)
DEPRECATED HCO3 PLAS-SCNC: 25 MMOL/L (ref 22–29)
EGFRCR SERPLBLD CKD-EPI 2021: 61 ML/MIN/1.73M2
GLUCOSE SERPL-MCNC: 130 MG/DL (ref 70–99)
POTASSIUM SERPL-SCNC: 4.2 MMOL/L (ref 3.4–5.3)
SODIUM SERPL-SCNC: 137 MMOL/L (ref 135–145)

## 2023-09-26 PROCEDURE — 36415 COLL VENOUS BLD VENIPUNCTURE: CPT | Performed by: PHYSICIAN ASSISTANT

## 2023-09-26 PROCEDURE — 80048 BASIC METABOLIC PNL TOTAL CA: CPT | Performed by: PHYSICIAN ASSISTANT

## 2023-09-26 PROCEDURE — 99214 OFFICE O/P EST MOD 30 MIN: CPT | Performed by: PHYSICIAN ASSISTANT

## 2023-09-26 RX ORDER — DIGOXIN 125 MCG
125 TABLET ORAL AT BEDTIME
Qty: 90 TABLET | Refills: 3 | Status: SHIPPED | OUTPATIENT
Start: 2023-09-26 | End: 2023-10-02 | Stop reason: SINTOL

## 2023-09-26 NOTE — PROGRESS NOTES
Cardiology C.O.R.E (heart failure specialty) Clinic Progress Note    Fanny Myers MRN# [id number]   YOB: 1945 Age: 78 year old     Primary cardiology team: Dr. Craig         Assessment and Plan     In summary, Fanny Myers presents today for a [facility] clinic follow up visit after the up-titration of metoprolol, initiation of Entresto and jardiance. Today, she feels well, with a stable BMP and marginal BP. HR remains mildly uncontrolled on exam.    HFrEF  EF: 33% (8/2023 cMRI)  Etiology: Mixed (ischemic + likely tachy-mediated)  GDMT: Toprol  mg BID, jardiance 10 mg daily, Entresto 24-26 mg BID.    PAF.   Rate remains mildly uncontrolled on high dose metoprolol.   Anticoagulated.   Adhesive allergy.    CAD, s/p PCI to D1 and distal LCX on 5/30/23.   Residual high-grade RPL lesion, was planned for staged PCI but this was cancelled as Dr. Flores and Dr. Craig did not feel it was severe enough lesion and patient was asymptomatic so medical therapy was recommended.    Denies angina.    HTN.  Marginal, asymptomatic.     Plan:  Start digoxin 125 mcg daily.   We will repeat an echocardiogram in another 2 months, after she has been more optimally rate controlled.  If EF remains less than or equal to 35% at that time, she will be referred for ICD implantation.       Shyann Abarca PA-C  [facility] - Heart Clinic         History of Presenting Illness     Fanny Myers is a pleasant 78 year old patient with a pertinent history of the following -   Chronic HFrEF, EF 25-30%. Mixed etiology (ischemic + AF).   8/22/23 cardiac MRI showing fairly stable LVEF of 33%, with LGE showing previous infarction in the diagonal distribution, overall consistent with a mixed cardiomyopathy.    CAD, s/p PCI to D1 and distal LCX on 5/30/23. Residual high-grade RPL lesion, was planned for staged PCI but this was cancelled as Dr. Flores and Dr. Volodymyr Craig did not feel it was severe enough lesion and patient was  "asymptomatic so medical therapy was recommended.    PAF, on rate control strategy with metoprolol, anticoagulated with Eliquis.  HTN  JOANNE suspected  Hypothyroidism  Social - She lives alone and uses a walker for security. Her daughter (who is an OT) and son in law live nearby and help with her laundry and groceries. She's a retired teacher.     I met Fanny end of August, at which point her HR was higher than ideal on metoprolol (average HR 91 bpm on holter). I increased this to 100 mg BID. Due to a reaction to adhesives, I deferred getting another Holter monitor on her, and asked that her daughter to assist in checking her heart rate intermittently, contact us if this remained greater than 90 bpm on average. Additionally, once her patient assistance for Entresto and jardiance came through, we started those, and stopped Losartan/torsemide (9/7).     As her EF remained low at 33% on MRI, we discussed ICD, which she hesitated to consider. We decided to repeat another echo in 3 months' time.     Today, Fanny returns to clinic stating she's continued to feel well from a cardiac standpoint! She continues to note some generalized fatigue, but gets around and does what she needs to do without issue. Patient denies chest pain, shortness of breath, PND, orthopnea, edema, claudication, palpitations, near syncope or syncope.   BPin clinic is again marginal, but she doesn't feel it. HR remains mildly uncontrolled on exam. Weight is stable from her last visit, and overall down 40 lbs from June. She hasn't been monitoring her BP's, HR's, or weights at home, and declines to do so.   BMP today shows normal renal fn, mildly elevated glucose level of 130.         Review of Systems     12-pt ROS is negative except for as noted in the HPI.          Physical Exam     Vitals: BP 94/72 (BP Location: Right arm, Patient Position: Sitting, Cuff Size: Adult Large)   Pulse 80   Ht 1.651 m (5' 5\")   Wt 111.4 kg (245 lb 11.2 oz)   SpO2 " 97%   BMI 40.89 kg/m    Wt Readings from Last 10 Encounters:   09/26/23 111.4 kg (245 lb 11.2 oz)   08/29/23 111.4 kg (245 lb 11.2 oz)   07/20/23 113.9 kg (251 lb)   06/06/23 122.2 kg (269 lb 8 oz)   06/01/23 129 kg (284 lb 6.4 oz)       Constitutional:  Patient is pleasant, alert, cooperative, and in NAD.  HEENT:  NCAT. PERRLA. EOM's intact.   Neck:  CVP appears normal. No carotid bruits.   Pulmonary: Normal respiratory effort. CTAB.   Cardiac: Irregularly irregular, variable S1,, no S3/S4, no murmur or rub.   Abdomen:  Non-tender abdomen, no hepatosplenomegaly appreciated.   Vascular: Pulses in the upper and lower extremities are 2+ and equal bilaterally.  Extremities: Trace edema with chronic venous stasis changes.  Skin:  No rashes or lesions appreciated.   Neurological:  No gross motor or sensory deficits.   Psych: Appropriate affect.          Data   Labs reviewed:  Recent Labs   Lab Test 06/06/23  1225 06/05/23  0545 05/30/23  1731 05/26/23  0553 05/25/23  1610   LDL  --   --  47 38  --    HDL  --   --  70 61  --    NHDL  --   --  65 54  --    CHOL  --   --  135 115  --    TRIG  --   --  91 82  --    TSH  --   --   --   --  7.02*   NTBNP 2,484* 2,544*  --   --   --        Lab Results   Component Value Date    WBC 7.4 06/21/2023    WBC 14.2 (H) 04/27/2013    RBC 4.86 06/21/2023    RBC 4.46 04/27/2013    HGB 12.8 06/21/2023    HGB 14.0 04/27/2013    HCT 42.8 06/21/2023    HCT 42.1 04/27/2013    MCV 88 06/21/2023    MCV 94 04/27/2013    MCH 26.3 (L) 06/21/2023    MCH 31.4 04/27/2013    MCHC 29.9 (L) 06/21/2023    MCHC 33.3 04/27/2013    RDW 17.2 (H) 06/21/2023    RDW 12.7 04/27/2013     06/21/2023     04/27/2013       Lab Results   Component Value Date     09/26/2023     04/27/2013    POTASSIUM 4.2 09/26/2023    POTASSIUM 4.5 04/27/2013    CHLORIDE 102 09/26/2023    CHLORIDE 101 04/27/2013    CO2 25 09/26/2023    CO2 29 04/27/2013    ANIONGAP 10 09/26/2023    ANIONGAP 10.2 04/27/2013      (H) 09/26/2023     (H) 04/27/2013    BUN 15.7 09/26/2023    BUN 19 04/27/2013    CR 0.95 09/26/2023    CR 1.21 (H) 04/27/2013    GFRESTIMATED 61 09/26/2023    GFRESTIMATED 44 (L) 04/27/2013    GFRESTBLACK 54 (L) 04/27/2013    DANIE 9.8 09/26/2023    DANIE 9.5 04/27/2013      Lab Results   Component Value Date    AST 33 05/25/2023    ALT 26 05/25/2023       No results found for: A1C    Lab Results   Component Value Date    INR 1.15 06/21/2023            Problem List     Patient Active Problem List   Diagnosis    Paroxysmal atrial fibrillation (H)    Acute congestive heart failure, unspecified heart failure type (H)    Elevated troponin    Type 2 MI (myocardial infarction) (H)    Acute systolic heart failure, due to tachyarrhythmia and ischemic CMO    Status post insertion of drug eluting coronary artery stents (to D1 and distal L Cx)    Primary hypertension    JOANNE (obstructive sleep apnea), suspected            Medications     Current Outpatient Medications   Medication Sig Dispense Refill    apixaban ANTICOAGULANT (ELIQUIS) 5 MG tablet Take 1 tablet (5 mg) by mouth 2 times daily      atorvastatin (LIPITOR) 40 MG tablet Take 1 tablet (40 mg) by mouth daily 90 tablet 3    clopidogrel (PLAVIX) 75 MG tablet Take 1 tablet (75 mg) by mouth daily      empagliflozin (JARDIANCE) 10 MG TABS tablet Take 1 tablet (10 mg) by mouth daily NOT CURRENTLY TAKING- Added to med list for patient assistance work up only. 8/22/23 (Patient taking differently: Take 10 mg by mouth daily Pt taking as of 8/22/23, on [facility] pt assistance through 12/31/2023) 90 tablet 3    famotidine (PEPCID) 20 MG tablet Take 1 tablet (20 mg) by mouth 2 times daily (Patient taking differently: Take 20 mg by mouth 2 times daily as needed)      levothyroxine (SYNTHROID/LEVOTHROID) 112 MCG tablet Take 112 mcg by mouth every morning (before breakfast)      metoprolol succinate ER (TOPROL XL) 50 MG 24 hr tablet Take 2 tablets (100mg) by mouth twice  daily 360 tablet 0    sacubitril-valsartan (ENTRESTO) 24-26 MG per tablet Take 1 tablet by mouth 2 times daily 180 tablet 3            Past Medical History   No past medical history on file.  Past Surgical History:   Procedure Laterality Date    APPENDECTOMY      CV CORONARY ANGIOGRAM N/A 5/30/2023    Procedure: Coronary Angiogram;  Surgeon: Bryant Simental MD;  Location:  [facility] CARDIAC CATH LAB    CV PCI N/A 5/30/2023    Procedure: Percutaneous Coronary Intervention;  Surgeon: Bryant Simental MD;  Location:  [facility] CARDI[facility] CATH LAB    ENT SURGERY      GYN SURGERY       Family History   Problem Relation Age of Onset    Heart Disease Mother         Per pt something heart related- unsure what    Heart Disease Father         Per pt something heart related- unsure what     Social History     Socioeconomic History    Marital status:      Spouse name: Not on file    Number of children: Not on file    Years of education: Not on file    Highest education level: Not on file   Occupational History    Not on file   Tobacco Use    Smoking status: Never    Smokeless tobacco: Never   Substance and Sexual Activity    Alcohol use: No    Drug use: No    Sexual activity: Not on file   Other Topics Concern    Not on file   Social History Narrative    Not on file     Social Determinants of Health     Financial Resource Strain: Not on file   Food Insecurity: Not on file   Transportation Needs: Not on file   Physical Activity: Not on file   Stress: Not on file   Social Connections: Not on file   Interpersonal Safety: Not on file   Housing Stability: Not on file            Allergies   Codeine sulfate and Penicillins    Today's clinic visit entailed:  Review of the result(s) of each unique test - BMP  Ordering of each unique test  Prescription drug management  I spent a total of 30 minutes on the day of the visit.   Time spent by me doing chart review, history and exam, documentation and further activities per the  note  Provider  Link to MDM Help Grid     The level of medical decision making during this visit was of moderate complexity.

## 2023-09-26 NOTE — PATIENT INSTRUCTIONS
Today, we discussed the following:  Medication changes:   START digoxin 125 mcg nightly to help bring the heart rate down in atrial fibrillation.   Follow up:   In November with an echo prior, as scheduled.     Please, remember to continue the followin.  Weigh yourself daily. Call if your weight is up > than 2 pounds in one day, or 5 pounds in one week; if you feel more short of breath or have worsening swelling in your legs or abdomen.  2.  Eat a low sodium diet (less than 2,000mg or 2g daily). If you eat less salt, you will retain less fluid.   3.  Avoid alcohol, as this can worsen heart failure.   4.  Avoid NSAIDs as able (For example, Ibuprofen / aleve / advil / naprosen / diclofenac).    Thank you for your visit with the C.O.R.E. Clinic today.   CORE stands for Cardiomyopathy Optimization Rehabilitation and Education. The CORE clinic will teach and help you to manage your heart failure and keep you out of the hospital.    Call C.O.R.E. nurse for any questions or concerns Mon-Fri 8am-4pm  [phone]: Nurse number   [phone]: After Hours Phone Number  Component      Latest Ref Rng 2023  11:45 AM 2023  1:39 PM   Sodium      135 - 145 mmol/L 136  137    Potassium      3.4 - 5.3 mmol/L 4.5  4.2    Chloride      98 - 107 mmol/L 102  102    Carbon Dioxide (CO2)      22 - 29 mmol/L 23  25    Anion Gap      7 - 15 mmol/L 11  10    Urea Nitrogen      8.0 - 23.0 mg/dL 17.4  15.7    Creatinine      0.51 - 0.95 mg/dL 1.00 (H)  0.95    Calcium      8.8 - 10.2 mg/dL 9.6  9.8    Glucose      70 - 99 mg/dL 115 (H)  130 (H)    GFR Estimate      >60 mL/min/1.73m2 57 (L)  61       Legend:  (H) High  (L) Low

## 2023-09-26 NOTE — LETTER
9/26/2023    Royer Avenido  [address]  [place] [address]    RE: Fanny Myers       Dear Colleague,     I had the pleasure of seeing Fanny Myers in the [facility].    Cardiology C.O.R.E (heart failure specialty) Clinic Progress Note    Fanny Myers MRN# [id number]   YOB: 1945 Age: 78 year old     Primary cardiology team: Dr. Craig         Assessment and Plan     In summary, Fanny Myers presents today for a CORE clinic follow up visit after the up-titration of metoprolol, initiation of Entresto and jardiance. Today, she feels well, with a stable BMP and marginal BP. HR remains mildly uncontrolled on exam.    HFrEF  EF: 33% (8/2023 cMRI)  Etiology: Mixed (ischemic + likely tachy-mediated)  GDMT: Toprol  mg BID, jardiance 10 mg daily, Entresto 24-26 mg BID.    PAF.   Rate remains mildly uncontrolled on high dose metoprolol.   Anticoagulated.   Adhesive allergy.    CAD, s/p PCI to D1 and distal LCX on 5/30/23.   Residual high-grade RPL lesion, was planned for staged PCI but this was cancelled as Dr. Flores and Dr. Craig did not feel it was severe enough lesion and patient was asymptomatic so medical therapy was recommended.    Denies angina.    HTN.  Marginal, asymptomatic.     Plan:  Start digoxin 125 mcg daily.   We will repeat an echocardiogram in another 2 months, after she has been more optimally rate controlled.  If EF remains less than or equal to 35% at that time, she will be referred for ICD implantation.       [facility]         History of Presenting Illness     Fanny Myers is a pleasant 78 year old patient with a pertinent history of the following -   Chronic HFrEF, EF 25-30%. Mixed etiology (ischemic + AF).   8/22/23 cardiac MRI showing fairly stable LVEF of 33%, with LGE showing previous infarction in the diagonal distribution, overall consistent with a mixed cardiomyopathy.    CAD, s/p PCI to D1  and distal LCX on 5/30/23. Residual high-grade RPL lesion, was planned for staged PCI but this was cancelled as Dr. Flores and  -- Ho did not feel it was severe enough lesion and patient was asymptomatic so medical therapy was recommended.    PAF, on rate control strategy with metoprolol, anticoagulated with Eliquis.  HTN  JOANNE suspected  Hypothyroidism  Social - She lives alone and uses a walker for security. Her daughter (who is an OT) and son in law live nearby and help with her laundry and groceries. She's a retired teacher.     I met Fanny end of August, at which point her HR was higher than ideal on metoprolol (average HR 91 bpm on holter). I increased this to 100 mg BID. Due to a reaction to adhesives, I deferred getting another Holter monitor on her, and asked that her daughter to assist in checking her heart rate intermittently, contact us if this remained greater than 90 bpm on average. Additionally, once her patient assistance for Entresto and jardiance came through, we started those, and stopped Losartan/torsemide (9/7).     As her EF remained low at 33% on MRI, we discussed ICD, which she hesitated to consider. We decided to repeat another echo in 3 months' time.     Today, Fanny returns to clinic stating she's continued to feel well from a cardiac standpoint! She continues to note some generalized fatigue, but gets around and does what she needs to do without issue. Patient denies chest pain, shortness of breath, PND, orthopnea, edema, claudication, palpitations, near syncope or syncope.   BPin clinic is again marginal, but she doesn't feel it. HR remains mildly uncontrolled on exam. Weight is stable from her last visit, and overall down 40 lbs from June. She hasn't been monitoring her BP's, HR's, or weights at home, and declines to do so.   BMP today shows normal renal fn, mildly elevated glucose level of 130.         Review of Systems     12-pt ROS is negative except for as noted in the HPI.  "         Physical Exam     Vitals: BP 94/72 (BP Location: Right arm, Patient Position: Sitting, Cuff Size: Adult Large)   Pulse 80   Ht 1.651 m (5' 5\")   Wt 111.4 kg (245 lb 11.2 oz)   SpO2 97%   BMI 40.89 kg/m    Wt Readings from Last 10 Encounters:   09/26/23 111.4 kg (245 lb 11.2 oz)   08/29/23 111.4 kg (245 lb 11.2 oz)   07/20/23 113.9 kg (251 lb)   06/06/23 122.2 kg (269 lb 8 oz)   06/01/23 129 kg (284 lb 6.4 oz)       Constitutional:  Patient is pleasant, alert, cooperative, and in NAD.  HEENT:  NCAT. PERRLA. EOM's intact.   Neck:  CVP appears normal. No carotid bruits.   Pulmonary: Normal respiratory effort. CTAB.   Cardiac: Irregularly irregular, variable S1,, no S3/S4, no murmur or rub.   Abdomen:  Non-tender abdomen, no hepatosplenomegaly appreciated.   Vascular: Pulses in the upper and lower extremities are 2+ and equal bilaterally.  Extremities: Trace edema with chronic venous stasis changes.  Skin:  No rashes or lesions appreciated.   Neurological:  No gross motor or sensory deficits.   Psych: Appropriate affect.          Data   Labs reviewed:  Recent Labs   Lab Test 06/06/23  1225 06/05/23  0545 05/30/23  1731 05/26/23  0553 05/25/23  1610   LDL  --   --  47 38  --    HDL  --   --  70 61  --    NHDL  --   --  65 54  --    CHOL  --   --  135 115  --    TRIG  --   --  91 82  --    TSH  --   --   --   --  7.02*   NTBNP 2,484* 2,544*  --   --   --        Lab Results   Component Value Date    WBC 7.4 06/21/2023    WBC 14.2 (H) 04/27/2013    RBC 4.86 06/21/2023    RBC 4.46 04/27/2013    HGB 12.8 06/21/2023    HGB 14.0 04/27/2013    HCT 42.8 06/21/2023    HCT 42.1 04/27/2013    MCV 88 06/21/2023    MCV 94 04/27/2013    MCH 26.3 (L) 06/21/2023    MCH 31.4 04/27/2013    MCHC 29.9 (L) 06/21/2023    MCHC 33.3 04/27/2013    RDW 17.2 (H) 06/21/2023    RDW 12.7 04/27/2013     06/21/2023     04/27/2013       Lab Results   Component Value Date     09/26/2023     04/27/2013    POTASSIUM " 4.2 09/26/2023    POTASSIUM 4.5 04/27/2013    CHLORIDE 102 09/26/2023    CHLORIDE 101 04/27/2013    CO2 25 09/26/2023    CO2 29 04/27/2013    ANIONGAP 10 09/26/2023    ANIONGAP 10.2 04/27/2013     (H) 09/26/2023     (H) 04/27/2013    BUN 15.7 09/26/2023    BUN 19 04/27/2013    CR 0.95 09/26/2023    CR 1.21 (H) 04/27/2013    GFRESTIMATED 61 09/26/2023    GFRESTIMATED 44 (L) 04/27/2013    GFRESTBLACK 54 (L) 04/27/2013    DANIE 9.8 09/26/2023    DANIE 9.5 04/27/2013      Lab Results   Component Value Date    AST 33 05/25/2023    ALT 26 05/25/2023       No results found for: A1C    Lab Results   Component Value Date    INR 1.15 06/21/2023            Problem List     Patient Active Problem List   Diagnosis    Paroxysmal atrial fibrillation (H)    Acute congestive heart failure, unspecified heart failure type (H)    Elevated troponin    Type 2 MI (myocardial infarction) (H)    Acute systolic heart failure, due to tachyarrhythmia and ischemic CMO    Status post insertion of drug eluting coronary artery stents (to D1 and distal L Cx)    Primary hypertension    JOANNE (obstructive sleep apnea), suspected            Medications     Current Outpatient Medications   Medication Sig Dispense Refill    apixaban ANTICOAGULANT (ELIQUIS) 5 MG tablet Take 1 tablet (5 mg) by mouth 2 times daily      atorvastatin (LIPITOR) 40 MG tablet Take 1 tablet (40 mg) by mouth daily 90 tablet 3    clopidogrel (PLAVIX) 75 MG tablet Take 1 tablet (75 mg) by mouth daily      empagliflozin (JARDIANCE) 10 MG TABS tablet Take 1 tablet (10 mg) by mouth daily NOT CURRENTLY TAKING- Added to med list for patient assistance work up only. 8/22/23 (Patient taking differently: Take 10 mg by mouth daily Pt taking as of 8/22/23, on [facility] pt assistance through 12/31/2023) 90 tablet 3    famotidine (PEPCID) 20 MG tablet Take 1 tablet (20 mg) by mouth 2 times daily (Patient taking differently: Take 20 mg by mouth 2 times daily as needed)      levothyroxine  (SYNTHROID/LEVOTHROID) 112 MCG tablet Take 112 mcg by mouth every morning (before breakfast)      metoprolol succinate ER (TOPROL XL) 50 MG 24 hr tablet Take 2 tablets (100mg) by mouth twice daily 360 tablet 0    sacubitril-valsartan (ENTRESTO) 24-26 MG per tablet Take 1 tablet by mouth 2 times daily 180 tablet 3            Past Medical History   No past medical history on file.  Past Surgical History:   Procedure Laterality Date    APPENDECTOMY      CV CORONARY ANGIOGRAM N/A 5/30/2023    Procedure: Coronary Angiogram;  Surgeon: Bryant Simental MD;  Location:  HEART CARDIAC CATH LAB    CV PCI N/A 5/30/2023    Procedure: Percutaneous Coronary Intervention;  Surgeon: Bryant Simental MD;  Location:  HEART CARDIAC CATH LAB    ENT SURGERY      GYN SURGERY       Family History   Problem Relation Age of Onset    Heart Disease Mother         Per pt something heart related- unsure what    Heart Disease Father         Per pt something heart related- unsure what     Social History     Socioeconomic History    Marital status:      Spouse name: Not on file    Number of children: Not on file    Years of education: Not on file    Highest education level: Not on file   Occupational History    Not on file   Tobacco Use    Smoking status: Never    Smokeless tobacco: Never   Substance and Sexual Activity    Alcohol use: No    Drug use: No    Sexual activity: Not on file   Other Topics Concern    Not on file   Social History Narrative    Not on file     Social Determinants of Health     Financial Resource Strain: Not on file   Food Insecurity: Not on file   Transportation Needs: Not on file   Physical Activity: Not on file   Stress: Not on file   Social Connections: Not on file   Interpersonal Safety: Not on file   Housing Stability: Not on file            Allergies   Codeine sulfate and Penicillins    Today's clinic visit entailed:  Review of the result(s) of each unique test - BMP  Ordering of each unique  test  Prescription drug management  I spent a total of 30 minutes on the day of the visit.   Time spent by me doing chart review, history and exam, documentation and further activities per the note  Provider  Link to MDM Help Grid     The level of medical decision making during this visit was of moderate complexity.      Thank you for allowing me to participate in the care of your patient.      Sincerely,     Shyann Abarca PA-C     [facility]  cc:   Shyann Abarca PA-C  [address] [place] [id number]

## 2023-10-02 ENCOUNTER — PATIENT OUTREACH (OUTPATIENT)
Dept: CARDIOLOGY | Facility: CLINIC | Age: 78
End: 2023-10-02
Payer: COMMERCIAL

## 2023-10-02 NOTE — PROGRESS NOTES
"[facility]: Heart Failure Care Coordination   Follow-Up Outreach     Situation/Background:      Chief Complaint   Patient presents with    CORE     Question about a medication      Received VM on CORE RN line from Fanny who is requesting call back as she has a question about a prescription she received from NOE Mir.     Chart Reviewed:   -- 9/26/23:  LOV w/ DANIELITO Mir.  PAF: rate remains mildly uncontrolled on high dose metoprolol.  In clinic BP 94/72 w/ pulse of 80.  \"Plan: Start digoxin 125 mcg daily. We will repeat an echocardiogram in another 2 months, after she has been more optimally rate controlled.  If EF remains less than or equal to 35% at that time, she will be referred for ICD implantation.\"    Assessment:      Called Fanny back to discuss further.  She is not able to tolerate Digoxin and has stopped this.  She started dig on Thur 9/28/23 and only took 2 days in a row, but then developed \"violent liquid diarrhea and emesis with great force\".  She denies being \"terribly\" dizzy on it, but did note mild dizziness/lightheadedness with it.  She felt \"queezy\" then next day, but then subsided completely and she is feeling \"back to normal\".  Last Digoxin date: 9/29/23.  She is requesting this be added to allergy list.  She reports she is feeling better overall and would prefer she not start any other new medication until discuss further with her at her next office visit in Nov.  She does not want to start anything new.     Intervention/Plan:      Digoxin removed from EMR and added to allergy list as side effect (N/V/D).  Informed Fanny will only call her back if Shyann Abarca has further recommendations before next OV.  She voices understanding and denies further questions or concerns at this time.     Confirmed patient has clinic and scheduling phone numbers.  RN CC routing to provider for review.    Future Appointments   Date Time Provider Department Center   11/20/2023 10:45 AM " [id number] [facility]   11/29/2023  1:00 PM Shyann Abarca PA-C [facility] PSA CLIN     Nadia Victoria RN BSN   [place], [place]  CJOE Clinic Care Coordinator  10/02/23, 12:26 PM

## 2023-10-02 NOTE — PATIENT INSTRUCTIONS
Minerva just called the CORE RN line back now saying she is now available to schedule.  Sasi, you able to call her back?

## 2023-10-02 NOTE — PROGRESS NOTES
I would like her to see CELINE TELLEZ for discussion on rhythm control approach in this case. If no availability for several months with them, I am happy to do a virtual or in-clinic visit with her in the meantime to discuss options.

## 2023-10-02 NOTE — PROGRESS NOTES
[facility] - C.O.R.E. Clinic    Orders are in for EP cardiology follow up.      Called pt to review recommendations of seeing EP MD sooner than Nov appt if able.  If EP MD not available sooner, Cary would be willing to see pt to discuss in clinic or virtually.  She is only willing to go to [facility] location at this time, but is open to getting in sooner if able and works with her transportation schedule.  Instructed pt to call CORE RN line if they develop rapid wt gain of > 2 lbs overnight or 5 lbs in 1 week, worsening shortness of breath, lower extremity edema, abdominal bloating, dizziness, orthopnea or worsening palpitations/faster HR.  She voices understanding and denies further questions or concerns at this time.     Message sent to scheduling requesting they call pt with EP's and/or Shyann Abarca's BV availability.     Future Appointments   Date Time Provider Department Center   11/20/2023 10:45 AM RSCCECHO2 [facility]   11/29/2023  1:00 PM Shyann Abarca PA-C [facility] PSA CLIN     Nadia Victoria, RN BSN   [facility] [place], [place]  C.O.R.E. Clinic Care Coordinator  10/02/23, 2:08 PM

## 2023-10-02 NOTE — PROGRESS NOTES
"10/2/23 pt busy will call back later to schedule-SL    Per Nadia RN \"Can you call pt to see if she can get in w/ EP MD in BV sooner than currently scheduled w/ SD[facility]?  If they do not have anything, can you try to get her in to see SDavis in EP or CORE slot?  Pt prefers in clinic if works with her schedule.  Thanks!\"  "

## 2023-10-20 ENCOUNTER — HOSPITAL ENCOUNTER (OUTPATIENT)
Dept: CARDIOLOGY | Facility: CLINIC | Age: 78
Discharge: HOME | End: 2023-10-20
Attending: PHYSICIAN ASSISTANT | Admitting: PHYSICIAN ASSISTANT
Payer: COMMERCIAL

## 2023-10-20 DIAGNOSIS — I50.23 ACUTE ON CHRONIC SYSTOLIC HEART FAILURE (H): ICD-10-CM

## 2023-10-20 LAB — LVEF ECHO: NORMAL

## 2023-10-20 PROCEDURE — 93325 DOPPLER ECHO COLOR FLOW MAPG: CPT

## 2023-10-20 PROCEDURE — 93308 TTE F-UP OR LMTD: CPT | Mod: 26 | Performed by: INTERNAL MEDICINE

## 2023-10-20 PROCEDURE — 93308 TTE F-UP OR LMTD: CPT

## 2023-10-20 PROCEDURE — 93325 DOPPLER ECHO COLOR FLOW MAPG: CPT | Mod: 26 | Performed by: INTERNAL MEDICINE

## 2023-10-20 PROCEDURE — 93321 DOPPLER ECHO F-UP/LMTD STD: CPT | Mod: 26 | Performed by: INTERNAL MEDICINE

## 2023-10-25 ENCOUNTER — OFFICE VISIT (OUTPATIENT)
Dept: CARDIOLOGY | Facility: CLINIC | Age: 78
End: 2023-10-25
Payer: COMMERCIAL

## 2023-10-25 VITALS
DIASTOLIC BLOOD PRESSURE: 80 MMHG | WEIGHT: 243.5 LBS | HEART RATE: 92 BPM | BODY MASS INDEX: 40.57 KG/M2 | HEIGHT: 65 IN | SYSTOLIC BLOOD PRESSURE: 116 MMHG | OXYGEN SATURATION: 98 %

## 2023-10-25 DIAGNOSIS — I50.23 ACUTE ON CHRONIC SYSTOLIC HEART FAILURE (H): ICD-10-CM

## 2023-10-25 DIAGNOSIS — I48.0 PAROXYSMAL ATRIAL FIBRILLATION (H): ICD-10-CM

## 2023-10-25 DIAGNOSIS — I50.21 ACUTE SYSTOLIC HEART FAILURE (H): ICD-10-CM

## 2023-10-25 PROCEDURE — 99214 OFFICE O/P EST MOD 30 MIN: CPT | Performed by: PHYSICIAN ASSISTANT

## 2023-10-25 RX ORDER — METOPROLOL SUCCINATE 50 MG/1
TABLET, EXTENDED RELEASE ORAL
Qty: 150 TABLET | Refills: 11 | Status: SHIPPED | OUTPATIENT
Start: 2023-10-25

## 2023-10-25 NOTE — PROGRESS NOTES
Cardiology C.O.R.E (heart failure specialty) Clinic Progress Note    Fanny Myers MRN# [id number]   YOB: 1945 Age: 78 year old     Primary cardiology team: Dr. Craig         Assessment and Plan     In summary, Fanny Myers presents today for a CORE clinic follow up visit after echocardiogram showed no improvement in EF.     HFrEF  EF: 33% (8/2023 cMRI), 30-35% on 10/2023 TTE.  Etiology: Mixed (ischemic + likely tachy-mediated)  GDMT: Toprol  mg BID, jardiance 10 mg daily, Entresto 24-26 mg BID.  Narrow QRS.    PAF.   Rate 90's at rest on high dose metoprolol. She unfortunately has not tolerated the addition of digoxin for better rate control in AF.   Anticoagulated.   Adhesive allergy.    CAD, s/p PCI to D1 and distal LCX on 5/30/23.   Residual high-grade RPL lesion, was planned for staged PCI but this was cancelled as Dr. Flores and Dr. Craig did not feel it was severe enough lesion and patient was asymptomatic.  Denies angina.    HTN.  Well-controlled.     Plan:  We discussed that her heart function is unlikely to improve at this point unless we get her out of AF. Recommend amiodarone load followed by cardioversion in two weeks. She's very hesitant to consider this, largely because she is feeling so well at this point. Did discuss the natural progression of HF again today. She wants to think about it further at home. For now, I've asked her to increase the Toprol to 150 mg qAM and 100 mg qPM to bring her HR down a little further.   She also hesitates to consider ICD at this time, and wants to continue to think about this at home as well.   I have asked her to see Dr. Craig in 3 months time. Of course, she will contact me sooner if she makes a decision about the above discussions.      [facility] - [facility] Clinic         History of Presenting Illness     Fanny Myers is a pleasant 78 year old patient with a pertinent history of the following -   Chronic  HFrEF, EF 25-30%. Mixed etiology (ischemic + AF).   8/22/23 cardiac MRI showing fairly stable LVEF of 33%, with LGE showing previous infarction in the diagonal distribution, overall consistent with a mixed cardiomyopathy.    CAD, s/p PCI to D1 and distal LCX on 5/30/23. Residual high-grade RPL lesion, was planned for staged PCI but this was cancelled as Dr. Flores and  -- Ho did not feel it was severe enough lesion and patient was asymptomatic so medical therapy was recommended.    PAF, on rate control strategy with metoprolol, anticoagulated with Eliquis.  HTN  JOANNE suspected  Hypothyroidism  Social - She lives alone and uses a walker for security. Her daughter (who is an OT) and son in law live nearby and help with her laundry and groceries. She's a retired teacher.     I met Fanny end of August, at which point her HR was higher than ideal on metoprolol (average HR 91 bpm on holter). I increased this to 100 mg BID. Due to a reaction to adhesives, I deferred getting another Holter monitor on her, and asked that her daughter to assist in checking her heart rate intermittently, contact us if this remained greater than 90 bpm on average. Additionally, once her patient assistance for Entresto and jardiance came through, we started those, and stopped Losartan/torsemide (9/7).     As her EF remained low at 33% on MRI, we discussed ICD, which she hesitated to consider. We decided to repeat another echo in 3 months' time.     When I saw her last in September, she felt well but HR remained slightly uncontrolled in AF. I started digoxin however she felt she didn't tolerate it and stopped taking it.     We repeated an echo on 10/20/23, showing stable EF at 30-35%.    Today, Fanny returns to clinic stating she's continued to feel well from a cardiac standpoint! She continues to note some generalized fatigue, but gets around and does what she needs to do without issue. Patient denies chest pain, shortness of breath,  "PND, orthopnea, edema, claudication, palpitations, near syncope or syncope.   BP well-controlled, HR 90's at rest. Weight is stable from her last visit, and overall down 40 lbs from June. She hasn't been monitoring her BP's, HR's, or weights at home, and declines to do so.          Review of Systems     12-pt ROS is negative except for as noted in the HPI.          Physical Exam     Vitals: /80 (BP Location: Right arm, Patient Position: Sitting, Cuff Size: Adult Large)   Pulse 92   Ht 1.651 m (5' 5\")   Wt 110.5 kg (243 lb 8 oz)   SpO2 98%   BMI 40.52 kg/m    Wt Readings from Last 10 Encounters:   10/25/23 110.5 kg (243 lb 8 oz)   09/26/23 111.4 kg (245 lb 11.2 oz)   08/29/23 111.4 kg (245 lb 11.2 oz)   07/20/23 113.9 kg (251 lb)   06/06/23 122.2 kg (269 lb 8 oz)   06/01/23 129 kg (284 lb 6.4 oz)       Constitutional:  Patient is pleasant, alert, cooperative, and in NAD.  HEENT:  NCAT. PERRLA. EOM's intact.   Neck:  CVP appears normal. No carotid bruits.   Pulmonary: Normal respiratory effort. CTAB.   Cardiac: Irregularly irregular, variable S1,, no S3/S4, no murmur or rub.   Abdomen:  Non-tender abdomen, no hepatosplenomegaly appreciated.   Vascular: Pulses in the upper and lower extremities are 2+ and equal bilaterally.  Extremities: Trace edema with chronic venous stasis changes.  Skin:  No rashes or lesions appreciated.   Neurological:  No gross motor or sensory deficits.   Psych: Appropriate affect.          Data   Labs reviewed:  Recent Labs   Lab Test 06/06/23  1225 06/05/23  0545 05/30/23  1731 05/26/23  0553 05/25/23  1610   LDL  --   --  47 38  --    HDL  --   --  70 61  --    NHDL  --   --  65 54  --    CHOL  --   --  135 115  --    TRIG  --   --  91 82  --    TSH  --   --   --   --  7.02*   NTBNP 2,484* 2,544*  --   --   --        Lab Results   Component Value Date    WBC 7.4 06/21/2023    WBC 14.2 (H) 04/27/2013    RBC 4.86 06/21/2023    RBC 4.46 04/27/2013    HGB 12.8 06/21/2023    HGB 14.0 " "04/27/2013    HCT 42.8 06/21/2023    HCT 42.1 04/27/2013    MCV 88 06/21/2023    MCV 94 04/27/2013    MCH 26.3 (L) 06/21/2023    MCH 31.4 04/27/2013    MCHC 29.9 (L) 06/21/2023    MCHC 33.3 04/27/2013    RDW 17.2 (H) 06/21/2023    RDW 12.7 04/27/2013     06/21/2023     04/27/2013       Lab Results   Component Value Date     09/26/2023     04/27/2013    POTASSIUM 4.2 09/26/2023    POTASSIUM 4.5 04/27/2013    CHLORIDE 102 09/26/2023    CHLORIDE 101 04/27/2013    CO2 25 09/26/2023    CO2 29 04/27/2013    ANIONGAP 10 09/26/2023    ANIONGAP 10.2 04/27/2013     (H) 09/26/2023     (H) 04/27/2013    BUN 15.7 09/26/2023    BUN 19 04/27/2013    CR 0.95 09/26/2023    CR 1.21 (H) 04/27/2013    GFRESTIMATED 61 09/26/2023    GFRESTIMATED 44 (L) 04/27/2013    GFRESTBLACK 54 (L) 04/27/2013    DANIE 9.8 09/26/2023    DANIE 9.5 04/27/2013      Lab Results   Component Value Date    AST 33 05/25/2023    ALT 26 05/25/2023       No results found for: \"A1C\"    Lab Results   Component Value Date    INR 1.15 06/21/2023            Problem List     Patient Active Problem List   Diagnosis    Paroxysmal atrial fibrillation (H)    Acute congestive heart failure, unspecified heart failure type (H)    Elevated troponin    Type 2 MI (myocardial infarction) (H)    Acute systolic heart failure, due to tachyarrhythmia and ischemic CMO    Status post insertion of drug eluting coronary artery stents (to D1 and distal L Cx)    Primary hypertension    JOANNE (obstructive sleep apnea), suspected            Medications     Current Outpatient Medications   Medication Sig Dispense Refill    apixaban ANTICOAGULANT (ELIQUIS) 5 MG tablet Take 1 tablet (5 mg) by mouth 2 times daily      atorvastatin (LIPITOR) 40 MG tablet Take 1 tablet (40 mg) by mouth daily 90 tablet 3    clopidogrel (PLAVIX) 75 MG tablet Take 1 tablet (75 mg) by mouth daily      empagliflozin (JARDIANCE) 10 MG TABS tablet Take 1 tablet (10 mg) by mouth daily Pt " taking as of 8/22/23, on BI cares pt assistance through 12/31/2023      famotidine (PEPCID) 20 MG tablet Take 1 tablet (20 mg) by mouth 2 times daily (Patient taking differently: Take 20 mg by mouth 2 times daily as needed)      levothyroxine (SYNTHROID/LEVOTHROID) 112 MCG tablet Take 112 mcg by mouth every morning (before breakfast)      metoprolol succinate ER (TOPROL XL) 50 MG 24 hr tablet Take 2 tablets (100mg) by mouth twice daily 360 tablet 0    sacubitril-valsartan (ENTRESTO) 24-26 MG per tablet Take 1 tablet by mouth 2 times daily 180 tablet 3            Past Medical History   History reviewed. No pertinent past medical history.  Past Surgical History:   Procedure Laterality Date    APPENDECTOMY      CV CORONARY ANGIOGRAM N/A 5/30/2023    Procedure: Coronary Angiogram;  Surgeon: Bryant Simental MD;  Location:  HEART CARDIAC CATH LAB    CV PCI N/A 5/30/2023    Procedure: Percutaneous Coronary Intervention;  Surgeon: Bryant Simental MD;  Location:  HEART CARDIAC CATH LAB    ENT SURGERY      GYN SURGERY       Family History   Problem Relation Age of Onset    Heart Disease Mother         Per pt something heart related- unsure what    Heart Disease Father         Per pt something heart related- unsure what     Social History     Socioeconomic History    Marital status:      Spouse name: Not on file    Number of children: Not on file    Years of education: Not on file    Highest education level: Not on file   Occupational History    Not on file   Tobacco Use    Smoking status: Never    Smokeless tobacco: Never   Substance and Sexual Activity    Alcohol use: No    Drug use: No    Sexual activity: Not on file   Other Topics Concern    Not on file   Social History Narrative    Not on file     Social Determinants of Health     Financial Resource Strain: Not on file   Food Insecurity: Not on file   Transportation Needs: Not on file   Physical Activity: Not on file   Stress: Not on file   Social  Connections: Not on file   Interpersonal Safety: Not on file   Housing Stability: Not on file            Allergies   Digoxin, Codeine sulfate, and Penicillins    Today's clinic visit entailed:  Review of the result(s) of each unique test - echocardiogram  Ordering of each unique test  Prescription drug management  I spent a total of 35 minutes on the day of the visit.   Time spent by me doing chart review, history and exam, documentation and further activities per the note  Provider  Link to MDM Help Grid     The level of medical decision making during this visit was of moderate complexity.

## 2023-10-25 NOTE — LETTER
10/25/2023    Royer Avenido  [address]  [place] [address]    RE: Fanny Myers       Dear Colleague,     I had the pleasure of seeing Fanny Myers in the [facility].    Cardiology C.O.R.E (heart failure specialty) Clinic Progress Note    Fanny Myers MRN# [id number]   YOB: 1945 Age: 78 year old     Primary cardiology team: Dr. Craig         Assessment and Plan     In summary, Fanny Myers presents today for a CORE clinic follow up visit after echocardiogram showed no improvement in EF.     HFrEF  EF: 33% (8/2023 cMRI), 30-35% on 10/2023 TTE.  Etiology: Mixed (ischemic + likely tachy-mediated)  GDMT: Toprol  mg BID, jardiance 10 mg daily, Entresto 24-26 mg BID.  Narrow QRS.    PAF.   Rate 90's at rest on high dose metoprolol. She unfortunately has not tolerated the addition of digoxin for better rate control in AF.   Anticoagulated.   Adhesive allergy.    CAD, s/p PCI to D1 and distal LCX on 5/30/23.   Residual high-grade RPL lesion, was planned for staged PCI but this was cancelled as Dr. Flores and Dr. Craig did not feel it was severe enough lesion and patient was asymptomatic.  Denies angina.    HTN.  Well-controlled.     Plan:  We discussed that her heart function is unlikely to improve at this point unless we get her out of AF. Recommend amiodarone load followed by cardioversion in two weeks. She's very hesitant to consider this, largely because she is feeling so well at this point. Did discuss the natural progression of HF again today. She wants to think about it further at home. For now, I've asked her to increase the Toprol to 150 mg qAM and 100 mg qPM to bring her HR down a little further.   She also hesitates to consider ICD at this time, and wants to continue to think about this at home as well.   I have asked her to see Dr. Craig in 3 months time. Of course, she will contact me sooner if she makes a decision about the above discussions.      Shyann  [facility] - Heart Clinic         History of Presenting Illness     Fanny Myers is a pleasant 78 year old patient with a pertinent history of the following -   Chronic HFrEF, EF 25-30%. Mixed etiology (ischemic + AF).   8/22/23 cardiac MRI showing fairly stable LVEF of 33%, with LGE showing previous infarction in the diagonal distribution, overall consistent with a mixed cardiomyopathy.    CAD, s/p PCI to D1 and distal LCX on 5/30/23. Residual high-grade RPL lesion, was planned for staged PCI but this was cancelled as Dr. Flores and  -- Rafa did not feel it was severe enough lesion and patient was asymptomatic so medical therapy was recommended.    PAF, on rate control strategy with metoprolol, anticoagulated with Eliquis.  HTN  JOANNE suspected  Hypothyroidism  Social - She lives alone and uses a walker for security. Her daughter (who is an OT) and son in law live nearby and help with her laundry and groceries. She's a retired teacher.     I met Fanny end of August, at which point her HR was higher than ideal on metoprolol (average HR 91 bpm on holter). I increased this to 100 mg BID. Due to a reaction to adhesives, I deferred getting another Holter monitor on her, and asked that her daughter to assist in checking her heart rate intermittently, contact us if this remained greater than 90 bpm on average. Additionally, once her patient assistance for Entresto and jardiance came through, we started those, and stopped Losartan/torsemide (9/7).     As her EF remained low at 33% on MRI, we discussed ICD, which she hesitated to consider. We decided to repeat another echo in 3 months' time.     When I saw her last in September, she felt well but HR remained slightly uncontrolled in AF. I started digoxin however she felt she didn't tolerate it and stopped taking it.     We repeated an echo on 10/20/23, showing stable EF at 30-35%.    Today, Fanny returns to clinic stating she's continued to  "feel well from a cardiac standpoint! She continues to note some generalized fatigue, but gets around and does what she needs to do without issue. Patient denies chest pain, shortness of breath, PND, orthopnea, edema, claudication, palpitations, near syncope or syncope.   BP well-controlled, HR 90's at rest. Weight is stable from her last visit, and overall down 40 lbs from June. She hasn't been monitoring her BP's, HR's, or weights at home, and declines to do so.          Review of Systems     12-pt ROS is negative except for as noted in the HPI.          Physical Exam     Vitals: /80 (BP Location: Right arm, Patient Position: Sitting, Cuff Size: Adult Large)   Pulse 92   Ht 1.651 m (5' 5\")   Wt 110.5 kg (243 lb 8 oz)   SpO2 98%   BMI 40.52 kg/m    Wt Readings from Last 10 Encounters:   10/25/23 110.5 kg (243 lb 8 oz)   09/26/23 111.4 kg (245 lb 11.2 oz)   08/29/23 111.4 kg (245 lb 11.2 oz)   07/20/23 113.9 kg (251 lb)   06/06/23 122.2 kg (269 lb 8 oz)   06/01/23 129 kg (284 lb 6.4 oz)       Constitutional:  Patient is pleasant, alert, cooperative, and in NAD.  HEENT:  NCAT. PERRLA. EOM's intact.   Neck:  CVP appears normal. No carotid bruits.   Pulmonary: Normal respiratory effort. CTAB.   Cardiac: Irregularly irregular, variable S1,, no S3/S4, no murmur or rub.   Abdomen:  Non-tender abdomen, no hepatosplenomegaly appreciated.   Vascular: Pulses in the upper and lower extremities are 2+ and equal bilaterally.  Extremities: Trace edema with chronic venous stasis changes.  Skin:  No rashes or lesions appreciated.   Neurological:  No gross motor or sensory deficits.   Psych: Appropriate affect.          Data   Labs reviewed:  Recent Labs   Lab Test 06/06/23  1225 06/05/23  0545 05/30/23  1731 05/26/23  0553 05/25/23  1610   LDL  --   --  47 38  --    HDL  --   --  70 61  --    NHDL  --   --  65 54  --    CHOL  --   --  135 115  --    TRIG  --   --  91 82  --    TSH  --   --   --   --  7.02*   NTBNP 2,573* " "2,544*  --   --   --        Lab Results   Component Value Date    WBC 7.4 06/21/2023    WBC 14.2 (H) 04/27/2013    RBC 4.86 06/21/2023    RBC 4.46 04/27/2013    HGB 12.8 06/21/2023    HGB 14.0 04/27/2013    HCT 42.8 06/21/2023    HCT 42.1 04/27/2013    MCV 88 06/21/2023    MCV 94 04/27/2013    MCH 26.3 (L) 06/21/2023    MCH 31.4 04/27/2013    MCHC 29.9 (L) 06/21/2023    MCHC 33.3 04/27/2013    RDW 17.2 (H) 06/21/2023    RDW 12.7 04/27/2013     06/21/2023     04/27/2013       Lab Results   Component Value Date     09/26/2023     04/27/2013    POTASSIUM 4.2 09/26/2023    POTASSIUM 4.5 04/27/2013    CHLORIDE 102 09/26/2023    CHLORIDE 101 04/27/2013    CO2 25 09/26/2023    CO2 29 04/27/2013    ANIONGAP 10 09/26/2023    ANIONGAP 10.2 04/27/2013     (H) 09/26/2023     (H) 04/27/2013    BUN 15.7 09/26/2023    BUN 19 04/27/2013    CR 0.95 09/26/2023    CR 1.21 (H) 04/27/2013    GFRESTIMATED 61 09/26/2023    GFRESTIMATED 44 (L) 04/27/2013    GFRESTBLACK 54 (L) 04/27/2013    DANIE 9.8 09/26/2023    DANIE 9.5 04/27/2013      Lab Results   Component Value Date    AST 33 05/25/2023    ALT 26 05/25/2023       No results found for: \"A1C\"    Lab Results   Component Value Date    INR 1.15 06/21/2023            Problem List     Patient Active Problem List   Diagnosis    Paroxysmal atrial fibrillation (H)    Acute congestive heart failure, unspecified heart failure type (H)    Elevated troponin    Type 2 MI (myocardial infarction) (H)    Acute systolic heart failure, due to tachyarrhythmia and ischemic CMO    Status post insertion of drug eluting coronary artery stents (to D1 and distal L Cx)    Primary hypertension    JOANNE (obstructive sleep apnea), suspected            Medications     Current Outpatient Medications   Medication Sig Dispense Refill    apixaban ANTICOAGULANT (ELIQUIS) 5 MG tablet Take 1 tablet (5 mg) by mouth 2 times daily      atorvastatin (LIPITOR) 40 MG tablet Take 1 tablet (40 " mg) by mouth daily 90 tablet 3    clopidogrel (PLAVIX) 75 MG tablet Take 1 tablet (75 mg) by mouth daily      empagliflozin (JARDIANCE) 10 MG TABS tablet Take 1 tablet (10 mg) by mouth daily Pt taking as of 8/22/23, on BI cares pt assistance through 12/31/2023      famotidine (PEPCID) 20 MG tablet Take 1 tablet (20 mg) by mouth 2 times daily (Patient taking differently: Take 20 mg by mouth 2 times daily as needed)      levothyroxine (SYNTHROID/LEVOTHROID) 112 MCG tablet Take 112 mcg by mouth every morning (before breakfast)      metoprolol succinate ER (TOPROL XL) 50 MG 24 hr tablet Take 2 tablets (100mg) by mouth twice daily 360 tablet 0    sacubitril-valsartan (ENTRESTO) 24-26 MG per tablet Take 1 tablet by mouth 2 times daily 180 tablet 3            Past Medical History   History reviewed. No pertinent past medical history.  Past Surgical History:   Procedure Laterality Date    APPENDECTOMY      CV CORONARY ANGIOGRAM N/A 5/30/2023    Procedure: Coronary Angiogram;  Surgeon: Bryant Simental MD;  Location:  HEART CARDIAC CATH LAB    CV PCI N/A 5/30/2023    Procedure: Percutaneous Coronary Intervention;  Surgeon: Bryant Simental MD;  Location:  HEART CARDIAC CATH LAB    ENT SURGERY      GYN SURGERY       Family History   Problem Relation Age of Onset    Heart Disease Mother         Per pt something heart related- unsure what    Heart Disease Father         Per pt something heart related- unsure what     Social History     Socioeconomic History    Marital status:      Spouse name: Not on file    Number of children: Not on file    Years of education: Not on file    Highest education level: Not on file   Occupational History    Not on file   Tobacco Use    Smoking status: Never    Smokeless tobacco: Never   Substance and Sexual Activity    Alcohol use: No    Drug use: No    Sexual activity: Not on file   Other Topics Concern    Not on file   Social History Narrative    Not on file     Social Determinants  of Health     Financial Resource Strain: Not on file   Food Insecurity: Not on file   Transportation Needs: Not on file   Physical Activity: Not on file   Stress: Not on file   Social Connections: Not on file   Interpersonal Safety: Not on file   Housing Stability: Not on file            Allergies   Digoxin, Codeine sulfate, and Penicillins    Today's clinic visit entailed:  Review of the result(s) of each unique test - echocardiogram  Ordering of each unique test  Prescription drug management  I spent a total of 35 minutes on the day of the visit.   Time spent by me doing chart review, history and exam, documentation and further activities per the note  Provider  Link to [facility] Help Grid     The level of medical decision making during this visit was of moderate complexity.      Thank you for allowing me to participate in the care of your patient.      Sincerely,     Shyann Abarca PA-C     [facility]  cc:   Shyann Abarca PA-C  [address]  [place] [address]

## 2023-10-25 NOTE — PATIENT INSTRUCTIONS
Today, we discussed the following:  Getting you out of atrial fibrillation to help strengthen or at least maintain the heart function.   The way we would do this is to start a medication called amiodarone and then shock the heart (cardiovert) a couple weeks later.   After a few months of being out of atrial fibrillation, re-check the heart function and see where you're at.   We also discussed a possible defibrillator implantation for prevention of sudden cardiac death, which you aren't sure about.   Medication changes:   For now, let's INCREASE the metoprolol to 3 tabs (150 mg) in the AM, and 2 tabs (100 mg) in the PM to help keep the heart rate a little slower.   Follow up:   With Dr. Craig in ~3 months. Please call me if you have any questions/concerns, or make a decision one way or the other about the atrial fibrillation plan in the meantime.     Please, remember to continue the followin.  Weigh yourself daily. Call if your weight is up > than 2 pounds in one day, or 5 pounds in one week; if you feel more short of breath or have worsening swelling in your legs or abdomen.  2.  Eat a low sodium diet (less than 2,000mg or 2g daily). If you eat less salt, you will retain less fluid.   3.  Avoid alcohol, as this can worsen heart failure.   4.  Avoid NSAIDs as able (For example, Ibuprofen / aleve / advil / naprosen / diclofenac).    Thank you for your visit with the [facility].R.E[facility] Clinic today.   [facility] stands for Cardiomyopathy [facility] and Education. The [facility] clinic will teach and help you to manage your heart failure and keep you out of the hospital.    Call C.O.R.E. nurse for any questions or concerns Mon-Fri 8am-4pm  [phone]: Nurse number   [phone]: After Hours Phone Number

## 2023-11-06 ENCOUNTER — PATIENT OUTREACH (OUTPATIENT)
Dept: CARDIOLOGY | Facility: CLINIC | Age: 78
End: 2023-11-06
Payer: COMMERCIAL

## 2023-11-06 DIAGNOSIS — Z95.5 STATUS POST INSERTION OF DRUG ELUTING CORONARY ARTERY STENT: ICD-10-CM

## 2023-11-06 RX ORDER — CLOPIDOGREL BISULFATE 75 MG/1
75 TABLET ORAL DAILY
Qty: 90 TABLET | Refills: 1 | Status: SHIPPED | OUTPATIENT
Start: 2023-11-06 | End: 2024-04-29

## 2023-11-06 NOTE — TELEPHONE ENCOUNTER
"[facility]: Heart Failure Care Coordination   Follow-Up Outreach     Situation/Background:      Chief Complaint   Patient presents with    CORE     Questions on Entresto and Jardiance        Called Minerva back. She asked if [facility] sends in refills for Entresto.   Minerva is also requesting \"       Reviewed Care Everywhere to see which cardiology meds are being refilled by PCP.     MIRTHA Rhodes, RN 2:35 PM 11/06/23    "

## 2023-11-07 DIAGNOSIS — I48.0 PAROXYSMAL ATRIAL FIBRILLATION (H): ICD-10-CM

## 2023-11-07 DIAGNOSIS — R79.89 ELEVATED TROPONIN: ICD-10-CM

## 2023-11-07 RX ORDER — ATORVASTATIN CALCIUM 40 MG/1
40 TABLET, FILM COATED ORAL DAILY
Qty: 90 TABLET | Refills: 1 | Status: SHIPPED | OUTPATIENT
Start: 2023-11-07 | End: 2024-05-06

## 2023-11-07 NOTE — TELEPHONE ENCOUNTER
"Reviewed Medications and recent refills:        Reviewed Care Every Where and noted this on 8/28/23 refill note for metoprolol from RN- \"Did speak to Adilene with [facility] and she does have rx on file from Dr. Abarca but patient has been declining rx's from any Provider other than PCP\"     MIRTHA Rhodes, RN 10:03 AM 11/07/23    " Diagnosis: F80.9 Speech Delay  Precautions: None  Insurance Type (# Auth): [employer] PPO     Total Timed Treatment: 50 min  Total Treatment time: 50 min     Date POC Expires:  3/23/21                                  Charges: x1 SP/L Treatment 63023     Treatm Progress - SLP modeled sounds and words throughout the session. Shilpa did not imitate any environmental sounds this session.      3. ) Radames Ramirez will imitate/produce single words to label items in play.   Progress - SLP and patient's mother modeled words t Richard Rosita will use total communication (sign, vocalizations, pictures) to request items 30x/session. Progress - Goal Not Met. Status improved. Shilpa primarily used pointing, gesturing, reaching in conjunction with vocalization to have her needs met.  She wa items in play with 80% accuracy. Progress - Adelyn was observed to identify a variety of objects during  play with at least 90% accuracy. Goal Met.      4) Adelyn will imitate/produce single words to label items in play.   Progress - Goal Not Met.  Status

## 2023-11-28 ENCOUNTER — PATIENT OUTREACH (OUTPATIENT)
Dept: CARDIOLOGY | Facility: CLINIC | Age: 78
End: 2023-11-28
Payer: COMMERCIAL

## 2023-11-28 NOTE — TELEPHONE ENCOUNTER
"[facility]: Heart Failure Care Coordination   Follow-Up Outreach     Situation/Background:      Chief Complaint   [place]    [facility]     [place]        Pearl left voicemail on CORE requesting call back to see if she \"has to call to make an appt with Dr Craig or will someone be calling her?\"    Assessment:      Minerva was seen by NOE Mir in [facility] on 10/25/2023  Follow up:   With Dr. Craig in ~3 months. Please call me if you have any questions/concerns, or make a decision one way or the other about the atrial fibrillation plan in the meantime.     No appt made with Dr Craig after appt. Order in Epic.   Reviewed appts - scheduling has not reached out to her to schedule yet.                 Intervention/Plan:      Called Fanny back. Appt to be made with Dr Craig on 1/26 at 245 PM.    MIRTHA Rhodes, RN 11:51 AM 11/28/23  "

## 2023-11-29 NOTE — TELEPHONE ENCOUNTER
Appt made, closing encounter      Future Appointments   Date Time Provider Department Center   1/26/2024  2:45 PM Shawn Craig MD [facility] CLIN       VIN RhodesN, RN 8:54 AM 11/29/23

## 2023-12-04 ENCOUNTER — DOCUMENTATION ONLY (OUTPATIENT)
Dept: CARDIOLOGY | Facility: CLINIC | Age: 78
End: 2023-12-04
Payer: COMMERCIAL

## 2023-12-04 DIAGNOSIS — I50.23 ACUTE ON CHRONIC SYSTOLIC HEART FAILURE (H): ICD-10-CM

## 2023-12-04 NOTE — PROGRESS NOTES
12-4-2023 I faxed 7 pages of re enrollment form, pt will need to send in her page and signature, But I had her insurance/income info so sent ot with Provider joshua  Deanna Telles Lpn    1-4-2024 received APPROVAL today, thru 12-, # [id number]  I called pt but had to leave a VM message. She will need to call [facility] to verify  shipping address b/4 they will send medication out  Deanna telles lpn     1/5/2024: LVM for pt reiterating above message and to CB if she has any questions.  TBNICOLE milan(AAMA) 1/5/2024

## 2023-12-10 ENCOUNTER — HEALTH MAINTENANCE LETTER (OUTPATIENT)
Age: 78
End: 2023-12-10

## 2024-01-04 RX ORDER — SACUBITRIL AND VALSARTAN 24; 26 MG/1; MG/1
1 TABLET, FILM COATED ORAL 2 TIMES DAILY
Qty: 180 TABLET | Refills: 3 | Status: SHIPPED | OUTPATIENT
Start: 2024-01-04 | End: 2024-02-27

## 2024-01-26 ENCOUNTER — OFFICE VISIT (OUTPATIENT)
Dept: CARDIOLOGY | Facility: CLINIC | Age: 79
End: 2024-01-26
Payer: COMMERCIAL

## 2024-01-26 VITALS
HEART RATE: 85 BPM | OXYGEN SATURATION: 97 % | DIASTOLIC BLOOD PRESSURE: 80 MMHG | WEIGHT: 245 LBS | BODY MASS INDEX: 40.82 KG/M2 | HEIGHT: 65 IN | SYSTOLIC BLOOD PRESSURE: 116 MMHG

## 2024-01-26 DIAGNOSIS — I25.5 ISCHEMIC CARDIOMYOPATHY: Primary | ICD-10-CM

## 2024-01-26 DIAGNOSIS — I50.23 ACUTE ON CHRONIC SYSTOLIC HEART FAILURE (H): ICD-10-CM

## 2024-01-26 PROBLEM — I21.4 NSTEMI (NON-ST ELEVATED MYOCARDIAL INFARCTION) (H): Status: ACTIVE | Noted: 2023-06-01

## 2024-01-26 PROCEDURE — 99214 OFFICE O/P EST MOD 30 MIN: CPT | Performed by: INTERNAL MEDICINE

## 2024-01-26 PROCEDURE — 93000 ELECTROCARDIOGRAM COMPLETE: CPT | Performed by: INTERNAL MEDICINE

## 2024-01-26 NOTE — PROGRESS NOTES
Cardiology Clinic Progress Note:    January 26, 2024   Patient Name: Fanny Myers  Patient MRN: [id number]     Consult indication: CHF    HPI:    I had the opportunity to see patient Fanny Myers in cardiology clinic for a follow up visit.     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    Patient was last seen in clinic with my colleague JUSTA Mir who has been following the patient very closely.  Patient has a history of heart failure with reduced ejection fraction, LVEF 30 to 35%, attributed to mixed ischemic and nonischemic cardiomyopathy, from atrial fibrillation/tachycardia.  Patient has a history of coronary artery disease status post PCI to D1 and distal left circumflex 5/30/2023, there was a plan for staged PCI to an RPL lesion.  However when patient presented for staged PCI, her dyspnea had resolved, and so after discussion with Dr. Flores, who is the interventional list at the time, the decision was made to hold off in favor of medical management and close monitoring.  Since then, guideline directed medical therapy has been adjusted diligently by my colleague JUSTA Mir.  The issue of ICD for primary prevention of sudden cardiac death was also discussed given persistent LV dysfunction, patient had declined this at the last visit.    Overall patient reports that she has been doing very well.  She denies any chest pain, chest pressure, abnormal shortness of breath.  Weight stable around 245 pounds.  BP today in clinic 116/80 mmHg.  Patient denies symptoms of orthopnea/PND, abnormal lower extremity swelling.  No palpitations.  ECG today in clinic demonstrates atrial fibrillation.  Amiodarone therapy  and cardioversion was discussed in the past, patient declined this in favor of conservative medical management, and so had been instructed to monitor her heart rates periodically at home.  Of note, patient did have an allergic reaction to the adhesives with cardiac monitoring in the past.      Patient lives alone, has 2 children who are 10 years apart, 3 grandchildren.  She is a retired , her  was an .  She is very proud of her children.    Assessment and Plan/Recommendations:    # HFrEF 2/2 mixed ICM and NICM, LVEF 30-35%. NYHA I-II. Stable.  Euvolemic on exam.  Weight 245 pounds.  # Persistent atrial fibrillation, rate controlled, asymptomatic, on apixaban  # CAD s/p PCI to D1 and DCx 5/30/2023, residual high grade RPL lesion medically managed  # HTN, stable  # Obesity    -Overall patient is in stable cardiac health without symptoms concerning for angina or decompensated heart failure  - Revisited discussion of ICD for primary prevention of sudden cardiac death given persistent severe LV dysfunction.  Patient would like to hold off for now, understands risk of fatal arrhythmia.  - Revisited option of cardioversion with antiarrhythmic therapy for the atrial fibrillation, she would like to hold off for now, since she feels well.  I think this is reasonable.  - Continue current cardiac regimen of apixaban, metoprolol succinate, Entresto, empaglifozin  - Continue clopidogrel for 12 months post PCI, then can continue apixaban monotherapy  - Follow-up in 3 months with JUSTA Mir with TTE at that time, or sooner as needed    Thank you for allowing our team to participate in the care of Fanny Myers.  Please do not hesitate to call or page me with any questions or concerns.    Sincerely,     Shawn Craig MD, [facility] [place]  Cardiology  Text Page   January 26, 2024    cc  JUSTA Valdez-C  [address]  [place] [address]    Voice recognition software  utilized.     Total time spent on this encounter today: Greater than 30 minutes, providing care in this encounter including, but not limited to, reviewing prior medical records, laboratory data, imaging studies, diagnostic studies, procedure notes, formulating an assessment and plan, recommendations, discussion and counseling with patient face to face, dictation.    Past Medical History:   The ASCVD Risk score (Miley CASTELAN, et al., 2019) failed to calculate for the following reasons:    The patient has a prior MI or stroke diagnosis  Past Medical History:   Diagnosis Date    Ischemic cardiomyopathy 01/26/2024    NSTEMI (non-ST elevated myocardial infarction) (H) 06/01/2023    Paroxysmal atrial fibrillation (H) 05/25/2023        Past Surgical History:   Past Surgical History:   Procedure Laterality Date    APPENDECTOMY      CV CORONARY ANGIOGRAM N/A 5/30/2023    Procedure: Coronary Angiogram;  Surgeon: Bryant Simental MD;  Location: [facility] CARDIAC CATH LAB    CV PCI N/A 5/30/2023    Procedure: Percutaneous Coronary Intervention;  Surgeon: Bryant Simental MD;  Location: [facility] CARDIAC CATH LAB    ENT SURGERY      GYN SURGERY         Medications (outpatient):  Current Outpatient Medications   Medication Sig Dispense Refill    apixaban ANTICOAGULANT (ELIQUIS) 5 MG tablet Take 1 tablet (5 mg) by mouth 2 times daily 180 tablet 3    atorvastatin (LIPITOR) 40 MG tablet Take 1 tablet (40 mg) by mouth daily 90 tablet 1    clopidogrel (PLAVIX) 75 MG tablet Take 1 tablet (75 mg) by mouth daily 90 tablet 1    empagliflozin (JARDIANCE) 10 MG TABS tablet Take 1 tablet (10 mg) by mouth daily Pt taking as of 8/22/23, on [facility] pt assistance through 12/31/2023      famotidine (PEPCID) 20 MG tablet Take 1 tablet (20 mg) by mouth 2 times daily (Patient taking differently: Take 20 mg by mouth 2 times daily as needed)      levothyroxine (SYNTHROID/LEVOTHROID) 112 MCG tablet Take 112 mcg by mouth every morning (before breakfast)    "   metoprolol succinate ER (TOPROL XL) 50 MG 24 hr tablet Take 3 tablets (150 mg) by mouth daily AND 2 tablets (100 mg) at bedtime. 150 tablet 11    sacubitril-valsartan (ENTRESTO) 24-26 MG per tablet Take 1 tablet by mouth 2 times daily 180 tablet 3       Allergies:  Allergies   Allergen Reactions    Digoxin Nausea and Vomiting and Diarrhea    Codeine Sulfate Nausea    Penicillins Other (See Comments)     Mother had severe anaphylaxis - was told as child never take PCN       Social History:   History   Drug Use No      History   Smoking Status    Never   Smokeless Tobacco    Never     Social History    Substance and Sexual Activity      Alcohol use: No       Family History:  Family History   Problem Relation Age of Onset    Heart Disease Mother         Per pt something heart related- unsure what    Heart Disease Father         Per pt something heart related- unsure what       Review of Systems:   A complete review of systems was negative except as mentioned in the History of Present Illness.     Objective & Physical Exam:  /80 (BP Location: Right arm, Patient Position: Sitting, Cuff Size: Adult Large)   Pulse 85   Ht 1.651 m (5' 5\")   Wt 111.1 kg (245 lb)   SpO2 97%   BMI 40.77 kg/m    Wt Readings from Last 2 Encounters:   01/26/24 111.1 kg (245 lb)   10/25/23 110.5 kg (243 lb 8 oz)     Body mass index is 40.77 kg/m .   Body surface area is 2.26 meters squared.    Constitutional: appears stated age, in no apparent distress, appears to be well nourished  Head: normocephalic, atraumatic  Neck: supple, trachea midline  Pulmonary: clear to auscultation bilaterally, no wheezes, no rales, no increased work of breathing  Cardiovascular: JVP normal, regular rate, irregularly irregular rhythm, no lower extremity edema  Gastrointestinal: no guarding, non-rigid   Neurologic: awake, alert, moves all extremities  Skin: no jaundice, warm on limited exam  Psychiatric: affect is normal, answers questions appropriately, " oriented to self and place    Data reviewed:  Lab Results   Component Value Date    WBC 7.4 06/21/2023    WBC 14.2 (H) 04/27/2013    RBC 4.86 06/21/2023    RBC 4.46 04/27/2013    HGB 12.8 06/21/2023    HGB 14.0 04/27/2013    HCT 42.8 06/21/2023    HCT 42.1 04/27/2013    MCV 88 06/21/2023    MCV 94 04/27/2013    MCH 26.3 (L) 06/21/2023    MCH 31.4 04/27/2013    MCHC 29.9 (L) 06/21/2023    MCHC 33.3 04/27/2013    RDW 17.2 (H) 06/21/2023    RDW 12.7 04/27/2013     06/21/2023     04/27/2013     Sodium   Date Value Ref Range Status   09/26/2023 137 135 - 145 mmol/L Final     Comment:     Reference intervals for this test were updated on 09/26/2023 to more accurately reflect our healthy population. There may be differences in the flagging of prior results with similar values performed with this method. Interpretation of those prior results can be made in the context of the updated reference intervals.    04/27/2013 140 133 - 144 mmol/L Final     Potassium   Date Value Ref Range Status   09/26/2023 4.2 3.4 - 5.3 mmol/L Final   04/27/2013 4.5 3.4 - 5.3 mmol/L Final     Chloride   Date Value Ref Range Status   09/26/2023 102 98 - 107 mmol/L Final   04/27/2013 101 94 - 109 mmol/L Final     Carbon Dioxide   Date Value Ref Range Status   04/27/2013 29 20 - 32 mmol/L Final     Carbon Dioxide (CO2)   Date Value Ref Range Status   09/26/2023 25 22 - 29 mmol/L Final     Anion Gap   Date Value Ref Range Status   09/26/2023 10 7 - 15 mmol/L Final   04/27/2013 10.2 6 - 17 mmol/L Final     Glucose   Date Value Ref Range Status   09/26/2023 130 (H) 70 - 99 mg/dL Final   04/27/2013 139 (H) 60 - 99 mg/dL Final     Urea Nitrogen   Date Value Ref Range Status   09/26/2023 15.7 8.0 - 23.0 mg/dL Final   04/27/2013 19 7 - 30 mg/dL Final     Creatinine   Date Value Ref Range Status   09/26/2023 0.95 0.51 - 0.95 mg/dL Final   04/27/2013 1.21 (H) 0.52 - 1.04 mg/dL Final     GFR Estimate   Date Value Ref Range Status   09/26/2023 61  ">60 mL/min/1.73m2 Final   2013 44 (L) >60 mL/min/1.7m2 Final     Calcium   Date Value Ref Range Status   2023 9.8 8.8 - 10.2 mg/dL Final   2013 9.5 8.5 - 10.4 mg/dL Final     Bilirubin Total   Date Value Ref Range Status   2023 1.0 <=1.2 mg/dL Final     Alkaline Phosphatase   Date Value Ref Range Status   2023 83 35 - 104 U/L Final     ALT   Date Value Ref Range Status   2023 26 10 - 35 U/L Final     AST   Date Value Ref Range Status   2023 33 10 - 35 U/L Final     Recent Labs   Lab Test 23  1731 23  0553   CHOL 135 115   HDL 70 61   LDL 47 38   TRIG 91 82      No results found for: \"A1C\"     Recent Results (from the past 4320 hour(s))   Echocardiogram Limited   Result Value    LVEF  30-35%    Narrative    [id number]  [id number]  [id number]  [id number]^MIGUE^CATRINA^ERIC     [facility]  Echocardiography Laboratory  [address] [place] [address]     Name: WERNER VALENTIN  MRN: [id number]  : 1945  Study Date: 10/20/2023 12:22 PM  Age: 78 yrs  Gender: Female  Patient Location: [facility]  Reason For Study: Acute on chronic systolic heart failure (H)  Ordering Physician: CATRINA CAMARENA  Referring Physician: CATRINA CAMARENA  Performed By: Tammi Arizmendi     BSA: 2.2 m2  Height: 65 in  Weight: 245 lb  BP: 94/67 mmHg  ______________________________________________________________________________  Procedure  Limited Echo Adult.  ______________________________________________________________________________  Interpretation Summary     The visual ejection fraction is 30-35%. Possibly slightly improved compared  with most recent study.  There is moderate global hypokinesia of the left ventricle.  Mildly decreased right ventricular systolic function  There is mild-moderate biatrial enlargement.  There is mild (1+) mitral regurgitation.  The study was technically " difficult.  ______________________________________________________________________________  Left Ventricle  The left ventricle is normal in size. There is normal left ventricular wall  thickness. The visual ejection fraction is 30-35%. There is moderate global  hypokinesia of the left ventricle.     Right Ventricle  The right ventricle is mildly dilated. Mildly decreased right ventricular  systolic function.     Atria  The left atrium is moderately dilated. There is mild-moderate biatrial  enlargement.     Mitral Valve  The mitral valve leaflets are mildly thickened. There is mild (1+) mitral  regurgitation.     Tricuspid Valve  The tricuspid valve is not well visualized, but is grossly normal.     Aortic Valve  The aortic valve is not well visualized.     Pulmonic Valve  The pulmonic valve is not well visualized.     Vessels  The aortic root is normal size. The inferior vena cava is normal.     Pericardium  There is no pericardial effusion.     Rhythm  The rhythm was atrial fibrillation.  ______________________________________________________________________________  MMode/2D Measurements & Calculations     TAPSE: 1.2 cm     Doppler Measurements & Calculations  TR max kiki: 231.0 cm/sec  TR max P.3 mmHg     ______________________________________________________________________________  Report approved by: Srinivas Valera 10/20/2023 02:18 PM

## 2024-01-26 NOTE — LETTER
1/26/2024    Royer Avenido  [address] [place] [address]    RE: Fanny Myers       Dear Colleague,     I had the pleasure of seeing Fanny Myers in the [facility].      Cardiology Clinic Progress Note:    January 26, 2024   Patient Name: Fanny Myers  Patient MRN: [id number]     Consult indication: CHF    HPI:    I had the opportunity to see patient Fanny Myers in cardiology clinic for a follow up visit.     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    Patient was last seen in clinic with my colleague JUSTA Mir who has been following the patient very closely.  Patient has a history of heart failure with reduced ejection fraction, LVEF 30 to 35%, attributed to mixed ischemic and nonischemic cardiomyopathy, from atrial fibrillation/tachycardia.  Patient has a history of coronary artery disease status post PCI to D1 and distal left circumflex 5/30/2023, there was a plan for staged PCI to an RPL lesion.  However when patient presented for staged PCI, her dyspnea had resolved, and so after discussion with Dr. Flores, who is the interventional list at the time, the decision was made to hold off in favor of medical management and close monitoring.  Since then, guideline directed medical therapy has been adjusted diligently by my colleague JUSTA Mir.  The issue of ICD for primary prevention of sudden cardiac death was also discussed given persistent LV dysfunction, patient had declined this at the last visit.    Overall patient reports that she has been doing very well.  She denies any chest pain, chest pressure, abnormal shortness of breath.  Weight stable around 245 pounds.  BP  today in clinic 116/80 mmHg.  Patient denies symptoms of orthopnea/PND, abnormal lower extremity swelling.  No palpitations.  ECG today in clinic demonstrates atrial fibrillation.  Amiodarone therapy and cardioversion was discussed in the past, patient declined this in favor of conservative medical management, and so had been instructed to monitor her heart rates periodically at home.  Of note, patient did have an allergic reaction to the adhesives with cardiac monitoring in the past.      Patient lives alone, has 2 children who are 10 years apart, 3 grandchildren.  She is a retired , her  was an .  She is very proud of her children.    Assessment and Plan/Recommendations:    # HFrEF 2/2 mixed ICM and NICM, LVEF 30-35%. NYHA I-II. Stable.  Euvolemic on exam.  Weight 245 pounds.  # Persistent atrial fibrillation, rate controlled, asymptomatic, on apixaban  # CAD s/p PCI to D1 and DCx 5/30/2023, residual high grade RPL lesion medically managed  # HTN, stable  # Obesity    -Overall patient is in stable cardiac health without symptoms concerning for angina or decompensated heart failure  - Revisited discussion of ICD for primary prevention of sudden cardiac death given persistent severe LV dysfunction.  Patient would like to hold off for now, understands risk of fatal arrhythmia.  - Revisited option of cardioversion with antiarrhythmic therapy for the atrial fibrillation, she would like to hold off for now, since she feels well.  I think this is reasonable.  - Continue current cardiac regimen of apixaban, metoprolol succinate, Entresto, empaglifozin  - Continue clopidogrel for 12 months post PCI, then can continue apixaban monotherapy  - Follow-up in 3 months with JUSTA Mir with TTE at that time, or sooner as needed    Thank you for allowing our team to participate in the care of Fanny Myers.  Please do not hesitate to call or page me with any questions or  concerns.    Sincerely,     Shawn Craig MD, [facility]  Cardiology  Text Page   January 26, 2024    cc  Shyann Abarca PA-C  [address]  [place] [address]    Voice recognition software utilized.     Total time spent on this encounter today: Greater than 30 minutes, providing care in this encounter including, but not limited to, reviewing prior medical records, laboratory data, imaging studies, diagnostic studies, procedure notes, formulating an assessment and plan, recommendations, discussion and counseling with patient face to face, dictation.    Past Medical History:   The ASCVD Risk score (Miley CASTELAN, et al., 2019) failed to calculate for the following reasons:    The patient has a prior MI or stroke diagnosis  Past Medical History:   Diagnosis Date    Ischemic cardiomyopathy 01/26/2024    NSTEMI (non-ST elevated myocardial infarction) (H) 06/01/2023    Paroxysmal atrial fibrillation (H) 05/25/2023        Past Surgical History:   Past Surgical History:   Procedure Laterality Date    APPENDECTOMY      CV CORONARY ANGIOGRAM N/A 5/30/2023    Procedure: Coronary Angiogram;  Surgeon: Bryant Simental MD;  Location: [facility] CARDIAC CATH LAB    CV PCI N/A 5/30/2023    Procedure: Percutaneous Coronary Intervention;  Surgeon: Bryant Simental MD;  Location: [facility] CARDIAC CATH LAB    ENT SURGERY      GYN SURGERY         Medications (outpatient):  Current Outpatient Medications   Medication Sig Dispense Refill    apixaban ANTICOAGULANT (ELIQUIS) 5 MG tablet Take 1 tablet (5 mg) by mouth 2 times daily 180 tablet 3    atorvastatin (LIPITOR) 40 MG tablet Take 1 tablet (40 mg) by mouth daily 90 tablet 1    clopidogrel (PLAVIX) 75 MG tablet Take 1 tablet (75 mg) by mouth daily 90 tablet 1    empagliflozin (JARDIANCE) 10 MG TABS tablet Take 1 tablet (10 mg) by mouth daily Pt taking as of 8/22/23, on BI cares pt assistance through 12/31/2023      famotidine (PEPCID) 20 MG tablet Take 1 tablet  "(20 mg) by mouth 2 times daily (Patient taking differently: Take 20 mg by mouth 2 times daily as needed)      levothyroxine (SYNTHROID/LEVOTHROID) 112 MCG tablet Take 112 mcg by mouth every morning (before breakfast)      metoprolol succinate ER (TOPROL XL) 50 MG 24 hr tablet Take 3 tablets (150 mg) by mouth daily AND 2 tablets (100 mg) at bedtime. 150 tablet 11    sacubitril-valsartan (ENTRESTO) 24-26 MG per tablet Take 1 tablet by mouth 2 times daily 180 tablet 3       Allergies:  Allergies   Allergen Reactions    Digoxin Nausea and Vomiting and Diarrhea    Codeine Sulfate Nausea    Penicillins Other (See Comments)     Mother had severe anaphylaxis - was told as child never take PCN       Social History:   History   Drug Use No      History   Smoking Status    Never   Smokeless Tobacco    Never     Social History    Substance and Sexual Activity      Alcohol use: No       Family History:  Family History   Problem Relation Age of Onset    Heart Disease Mother         Per pt something heart related- unsure what    Heart Disease Father         Per pt something heart related- unsure what       Review of Systems:   A complete review of systems was negative except as mentioned in the History of Present Illness.     Objective & Physical Exam:  /80 (BP Location: Right arm, Patient Position: Sitting, Cuff Size: Adult Large)   Pulse 85   Ht 1.651 m (5' 5\")   Wt 111.1 kg (245 lb)   SpO2 97%   BMI 40.77 kg/m    Wt Readings from Last 2 Encounters:   01/26/24 111.1 kg (245 lb)   10/25/23 110.5 kg (243 lb 8 oz)     Body mass index is 40.77 kg/m .   Body surface area is 2.26 meters squared.    Constitutional: appears stated age, in no apparent distress, appears to be well nourished  Head: normocephalic, atraumatic  Neck: supple, trachea midline  Pulmonary: clear to auscultation bilaterally, no wheezes, no rales, no increased work of breathing  Cardiovascular: JVP normal, regular rate, irregularly irregular rhythm, no " lower extremity edema  Gastrointestinal: no guarding, non-rigid   Neurologic: awake, alert, moves all extremities  Skin: no jaundice, warm on limited exam  Psychiatric: affect is normal, answers questions appropriately, oriented to self and place    Data reviewed:  Lab Results   Component Value Date    WBC 7.4 06/21/2023    WBC 14.2 (H) 04/27/2013    RBC 4.86 06/21/2023    RBC 4.46 04/27/2013    HGB 12.8 06/21/2023    HGB 14.0 04/27/2013    HCT 42.8 06/21/2023    HCT 42.1 04/27/2013    MCV 88 06/21/2023    MCV 94 04/27/2013    MCH 26.3 (L) 06/21/2023    MCH 31.4 04/27/2013    MCHC 29.9 (L) 06/21/2023    MCHC 33.3 04/27/2013    RDW 17.2 (H) 06/21/2023    RDW 12.7 04/27/2013     06/21/2023     04/27/2013     Sodium   Date Value Ref Range Status   09/26/2023 137 135 - 145 mmol/L Final     Comment:     Reference intervals for this test were updated on 09/26/2023 to more accurately reflect our healthy population. There may be differences in the flagging of prior results with similar values performed with this method. Interpretation of those prior results can be made in the context of the updated reference intervals.    04/27/2013 140 133 - 144 mmol/L Final     Potassium   Date Value Ref Range Status   09/26/2023 4.2 3.4 - 5.3 mmol/L Final   04/27/2013 4.5 3.4 - 5.3 mmol/L Final     Chloride   Date Value Ref Range Status   09/26/2023 102 98 - 107 mmol/L Final   04/27/2013 101 94 - 109 mmol/L Final     Carbon Dioxide   Date Value Ref Range Status   04/27/2013 29 20 - 32 mmol/L Final     Carbon Dioxide (CO2)   Date Value Ref Range Status   09/26/2023 25 22 - 29 mmol/L Final     Anion Gap   Date Value Ref Range Status   09/26/2023 10 7 - 15 mmol/L Final   04/27/2013 10.2 6 - 17 mmol/L Final     Glucose   Date Value Ref Range Status   09/26/2023 130 (H) 70 - 99 mg/dL Final   04/27/2013 139 (H) 60 - 99 mg/dL Final     Urea Nitrogen   Date Value Ref Range Status   09/26/2023 15.7 8.0 - 23.0 mg/dL Final  "  2013 19 7 - 30 mg/dL Final     Creatinine   Date Value Ref Range Status   2023 0.95 0.51 - 0.95 mg/dL Final   2013 1.21 (H) 0.52 - 1.04 mg/dL Final     GFR Estimate   Date Value Ref Range Status   2023 61 >60 mL/min/1.73m2 Final   2013 44 (L) >60 mL/min/1.7m2 Final     Calcium   Date Value Ref Range Status   2023 9.8 8.8 - 10.2 mg/dL Final   2013 9.5 8.5 - 10.4 mg/dL Final     Bilirubin Total   Date Value Ref Range Status   2023 1.0 <=1.2 mg/dL Final     Alkaline Phosphatase   Date Value Ref Range Status   2023 83 35 - 104 U/L Final     ALT   Date Value Ref Range Status   2023 26 10 - 35 U/L Final     AST   Date Value Ref Range Status   2023 33 10 - 35 U/L Final     Recent Labs   Lab Test 23  1731 23  0553   CHOL 135 115   HDL 70 61   LDL 47 38   TRIG 91 82      No results found for: \"A1C\"     Recent Results (from the past 4320 hour(s))   Echocardiogram Limited   Result Value    LVEF  30-35%    Narrative    [id number]  [id number]  [id number]  [id number]^MIGUE^CATRINA^ERIC     [facility]  Echocardiography Laboratory  [address] [place] [address]     Name: WERNER VALENTIN  MRN: [id number]  : 1945  Study Date: 10/20/2023 12:22 PM  Age: 78 yrs  Gender: Female  Patient Location: [facility]  Reason For Study: Acute on chronic systolic heart failure (H)  Ordering Physician: CATRINA CAMARENA  Referring Physician: CATRINA CAMARENA  Performed By: Tammi Arizmendi     BSA: 2.2 m2  Height: 65 in  Weight: 245 lb  BP: 94/67 mmHg  ______________________________________________________________________________  Procedure  Limited Echo Adult.  ______________________________________________________________________________  Interpretation Summary     The visual ejection fraction is 30-35%. Possibly slightly improved compared  with most recent study.  There is moderate global hypokinesia of the left ventricle.  Mildly " decreased right ventricular systolic function  There is mild-moderate biatrial enlargement.  There is mild (1+) mitral regurgitation.  The study was technically difficult.  ______________________________________________________________________________  Left Ventricle  The left ventricle is normal in size. There is normal left ventricular wall  thickness. The visual ejection fraction is 30-35%. There is moderate global  hypokinesia of the left ventricle.     Right Ventricle  The right ventricle is mildly dilated. Mildly decreased right ventricular  systolic function.     Atria  The left atrium is moderately dilated. There is mild-moderate biatrial  enlargement.     Mitral Valve  The mitral valve leaflets are mildly thickened. There is mild (1+) mitral  regurgitation.     Tricuspid Valve  The tricuspid valve is not well visualized, but is grossly normal.     Aortic Valve  The aortic valve is not well visualized.     Pulmonic Valve  The pulmonic valve is not well visualized.     Vessels  The aortic root is normal size. The inferior vena cava is normal.     Pericardium  There is no pericardial effusion.     Rhythm  The rhythm was atrial fibrillation.  ______________________________________________________________________________  MMode/2D Measurements & Calculations     TAPSE: 1.2 cm     Doppler Measurements & Calculations  TR max kiki: 231.0 cm/sec  TR max P.3 mmHg     ______________________________________________________________________________  Report approved by: Srinivas Valera 10/20/2023 02:18 PM              Thank you for allowing me to participate in the care of your patient.      Sincerely,     Shawn K. Ho, MD     [facility]  cc:   Shyann Abarca PA-C  [address]  [place] [address]

## 2024-01-26 NOTE — PATIENT INSTRUCTIONS
January 26, 2024    Thank you for allowing our Cardiology team to participate in your care.     Please note the following changes to your heart treatment plan:     Medication changes:   - none  - stop clopidogrel 75 mg daily 6/1/2024    Tests to be done:  - TTE in 3 months  - NON-fasting labs in 3 months    Follow up:  - Follow up in 3 months with Shyann MARR, or sooner as needed.      For scheduling, please call [phone].      Please contact our team through [employer] or our Nurse Team Voicemail service [phone], or the General Clinic [phone] for any questions or concerns.     If you are having a medical emergency, please call [phone].     Sincerely,    Shawn Craig MD, FACC  Cardiology    [facility] [place]

## 2024-02-19 ENCOUNTER — DOCUMENTATION ONLY (OUTPATIENT)
Dept: CARDIOLOGY | Facility: CLINIC | Age: 79
End: 2024-02-19
Payer: COMMERCIAL

## 2024-02-19 DIAGNOSIS — I50.23 ACUTE ON CHRONIC SYSTOLIC HEART FAILURE (H): ICD-10-CM

## 2024-02-19 NOTE — PROGRESS NOTES
2--received  a call today, pt is needing to reapply for the [facility] assistance program for Jardiance.we talked about what to fill in and sign. She will take care of her portion of the joshua. I will fill out provider portion and RX. Once signed I will fax to the [facility] program  Deanna Baum Lpn      2- received signed provider joshua and RX, faxed to [facility]  Deanna Baum LPN

## 2024-02-26 ENCOUNTER — PATIENT OUTREACH (OUTPATIENT)
Dept: CARDIOLOGY | Facility: CLINIC | Age: 79
End: 2024-02-26
Payer: COMMERCIAL

## 2024-02-26 DIAGNOSIS — I50.23 ACUTE ON CHRONIC SYSTOLIC HEART FAILURE (H): ICD-10-CM

## 2024-02-26 NOTE — TELEPHONE ENCOUNTER
"[facility]: Heart Failure Care Coordination   Follow-Up Outreach     Situation/Background:      Chief Complaint   Patient presents with    CORE     Jardiance and Entresto Pt assistance updates 2024      Pearl left voicemail on CORE line requesting a call back regarding status of re-enrollment for patient assistance programs with [employer] and [employer].    Reviewed chart:   -- [employer]/Entresto update:   *1-4-2024 received APPROVAL today, thru 12-, # [id number]  I called pt but had to leave a VM message. She will need to call [facility] to verify  shipping address b/4 they will send medication out  Deanna telles lpn   *1/4/2024: Prescription for Entresto 24-26 mg tablets were sent to [facility] by CORE RN      -- [employer]/Jardiance update:   *2--received  a call today, pt is needing to reapply for the [employer] assistance program for Jardiance.we talked about what to fill in and sign. She will take care of her portion of the joshua. I will fill out provider portion and RX. Once signed I will fax to the [employer] program  *2/19/2024: Prescription for Jardiance - RX printed to accompany [employer] RE ENROLLMENT application per ENMA Ferris Lpn  *2- received signed provider joshua and RX, faxed to [employer]  Deanna Telles LPN      Call placed to Pearl.   We did discuss the above information. She reports she did send in pt portion for Jardiance and \"sent it 2/20.\"  She states she has \"2 days worth of Jardiance and then I will be out.\" She reports she \"has plenty of the Entresto.\"   She states she \"is not able to speak to a real person when I call [employer] and [employer]. Can you call them and figure out what is going on?\"      [facility] will call [employer] and [employer] 2/27 for updates.     Future Appointments   Date Time Provider Department Center   4/26/2024  2:45 PM RSCCECHO1 [facility]   4/26/2024  3:45 PM RU LAB [facility]   4/30/2024 10:10 AM [facility] Galvan PA-C " [facility]       Belkys Cao, VINN, RN 5:04 PM 02/26/24

## 2024-02-27 RX ORDER — SACUBITRIL AND VALSARTAN 24; 26 MG/1; MG/1
1 TABLET, FILM COATED ORAL 2 TIMES DAILY
Qty: 180 TABLET | Refills: 3 | Status: SHIPPED | OUTPATIENT
Start: 2024-02-27

## 2024-02-27 NOTE — PROGRESS NOTES
2- received a fax from [facility], pt does not qualify for the free Jardiance as she has private drug coverage. I sent a Teams message to CORE RN to let them know. Pt will received a letter in the mail from the [facility] program  Deanna Baum lpn    **2- Just received additional Fax  [facility] has decided to approve pt  Effective today thru 12-  Mmunns lpn

## 2024-02-27 NOTE — TELEPHONE ENCOUNTER
Call placed to  Cares and [employer] for statis updates on re-enrollments.    [employer]: Pt portion had private ins checked. Pt only has Medicare/[employer] Medicare Rx. Update [employer]. Pt has been approved for Jardiance (2/27/ - 12/31/2024) and will be shipped and should arrive to pt in 7 business days.     [employer]: Pt has been approved through 12/31/2024. Requested a updated prescription as one sent on 1/4/2024 was not received. Updated prescription sent.     Call placed to Pearl for update. She expressed understanding and wait for medications to arrive at her home.    Snap shot and spread sheets updated.    Future Appointments   Date Time Provider Department Center   4/26/2024  2:45 PM RSCCECHO1 [facility] RSCC   4/26/2024  3:45 PM RU LAB [facility]   4/30/2024 10:10 AM [facility]       MIRTHA Rhodes, RN 1:59 PM 02/27/24

## 2024-03-04 NOTE — TELEPHONE ENCOUNTER
"Pearl returned call and left voicemail. She received Jardiance with 3 refills. She confirmed she will wait for Entresto to arrive on 3/7. She will call CORE if it does not arrive. She has enough Entresto \"for 2 wks.\"      Future Appointments   Date Time Provider Department Center   4/26/2024  2:45 PM RSCCECHO1 [facility] RSCC   4/26/2024  3:45 PM RU LAB [facility]   4/30/2024 10:10 AM [facility]       Belkys Cao, VINN, RN 3:29 PM 03/04/24    "

## 2024-03-04 NOTE — TELEPHONE ENCOUNTER
Pearl called and left voicemail stating she has not received Entresto from [employer].       Call to [facility] for a status update. Spoke to Hugh.  No refills have been requested by patient.   Refill will be processed today and be shipped to patient tomorrow and arrive 3/7.       Call placed to Pearl with an update. No answer, left detailed voicemail that Entresto refill is being processed and will be shipped tomorrow with arrival date of 3/7. I did ask her to call back to let [facility] know how many tablets of Entresto she has remaining and if Jardiance refill has arrived. [facility]'s direct number was left.       Future Appointments   Date Time Provider Department Center   4/26/2024  2:45 PM RSCCECHO1 [facility] RSCC   4/26/2024  3:45 PM RU LAB [facility]   4/30/2024 10:10 AM [facility]     MIRTHA Rhodes, RN 2:50 PM 03/04/24

## 2024-04-26 ENCOUNTER — HOSPITAL ENCOUNTER (OUTPATIENT)
Dept: CARDIOLOGY | Facility: CLINIC | Age: 79
Discharge: HOME | End: 2024-04-26
Attending: INTERNAL MEDICINE | Admitting: INTERNAL MEDICINE
Payer: COMMERCIAL

## 2024-04-26 DIAGNOSIS — I50.23 ACUTE ON CHRONIC SYSTOLIC HEART FAILURE (H): ICD-10-CM

## 2024-04-26 LAB — LVEF ECHO: NORMAL

## 2024-04-26 PROCEDURE — 93306 TTE W/DOPPLER COMPLETE: CPT

## 2024-04-26 PROCEDURE — 93306 TTE W/DOPPLER COMPLETE: CPT | Mod: 26 | Performed by: INTERNAL MEDICINE

## 2024-04-29 DIAGNOSIS — Z95.5 STATUS POST INSERTION OF DRUG ELUTING CORONARY ARTERY STENT: ICD-10-CM

## 2024-04-29 RX ORDER — CLOPIDOGREL BISULFATE 75 MG/1
75 TABLET ORAL DAILY
Qty: 30 TABLET | Refills: 0 | Status: SHIPPED | OUTPATIENT
Start: 2024-04-29 | End: 2024-06-04

## 2024-04-30 ENCOUNTER — OFFICE VISIT (OUTPATIENT)
Dept: CARDIOLOGY | Facility: CLINIC | Age: 79
End: 2024-04-30
Attending: INTERNAL MEDICINE
Payer: COMMERCIAL

## 2024-04-30 ENCOUNTER — LAB (OUTPATIENT)
Dept: LAB | Facility: CLINIC | Age: 79
End: 2024-04-30
Payer: COMMERCIAL

## 2024-04-30 VITALS
HEIGHT: 64 IN | SYSTOLIC BLOOD PRESSURE: 106 MMHG | OXYGEN SATURATION: 97 % | BODY MASS INDEX: 42.07 KG/M2 | DIASTOLIC BLOOD PRESSURE: 72 MMHG | HEART RATE: 78 BPM | WEIGHT: 246.4 LBS

## 2024-04-30 DIAGNOSIS — I50.23 ACUTE ON CHRONIC SYSTOLIC HEART FAILURE (H): ICD-10-CM

## 2024-04-30 LAB
ANION GAP SERPL CALCULATED.3IONS-SCNC: 14 MMOL/L (ref 7–15)
BUN SERPL-MCNC: 13.7 MG/DL (ref 8–23)
CALCIUM SERPL-MCNC: 9.6 MG/DL (ref 8.8–10.2)
CHLORIDE SERPL-SCNC: 103 MMOL/L (ref 98–107)
CREAT SERPL-MCNC: 0.98 MG/DL (ref 0.51–0.95)
DEPRECATED HCO3 PLAS-SCNC: 21 MMOL/L (ref 22–29)
EGFRCR SERPLBLD CKD-EPI 2021: 58 ML/MIN/1.73M2
ERYTHROCYTE [DISTWIDTH] IN BLOOD BY AUTOMATED COUNT: 13.4 % (ref 10–15)
GLUCOSE SERPL-MCNC: 80 MG/DL (ref 70–99)
HCT VFR BLD AUTO: 49.1 % (ref 35–47)
HGB BLD-MCNC: 15.9 G/DL (ref 11.7–15.7)
MCH RBC QN AUTO: 31.8 PG (ref 26.5–33)
MCHC RBC AUTO-ENTMCNC: 32.4 G/DL (ref 31.5–36.5)
MCV RBC AUTO: 98 FL (ref 78–100)
PLATELET # BLD AUTO: 378 10E3/UL (ref 150–450)
POTASSIUM SERPL-SCNC: 4.1 MMOL/L (ref 3.4–5.3)
RBC # BLD AUTO: 5 10E6/UL (ref 3.8–5.2)
SODIUM SERPL-SCNC: 138 MMOL/L (ref 135–145)
WBC # BLD AUTO: 11 10E3/UL (ref 4–11)

## 2024-04-30 PROCEDURE — 85027 COMPLETE CBC AUTOMATED: CPT | Performed by: INTERNAL MEDICINE

## 2024-04-30 PROCEDURE — 36415 COLL VENOUS BLD VENIPUNCTURE: CPT | Performed by: INTERNAL MEDICINE

## 2024-04-30 PROCEDURE — 80048 BASIC METABOLIC PNL TOTAL CA: CPT | Performed by: INTERNAL MEDICINE

## 2024-04-30 PROCEDURE — 99214 OFFICE O/P EST MOD 30 MIN: CPT | Performed by: PHYSICIAN ASSISTANT

## 2024-04-30 NOTE — PROGRESS NOTES
CARDIOLOGY CLINIC PROGRESS NOTE    DOS: 2024      Fanny Myers  : 1945, 79 year old  MRN: [id number]      Primary cardiologist: Dr. 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       Patient has a history of heart failure with reduced ejection fraction, LVEF 30 to 35%, attributed to mixed ischemic and nonischemic cardiomyopathy, from atrial fibrillation/tachycardia.  Patient has a history of coronary artery disease status post PCI to D1 and distal left circumflex 2023, there was a plan for staged PCI to an RPL lesion.  However when patient presented for staged PCI, her dyspnea had resolved, and so after discussion with Dr. Flores, who is the interventionalist at the time, the decision was made to hold off in favor of medical management and close monitoring.  Since then, guideline directed medical therapy has been adjusted.  The issue of ICD for primary prevention of sudden cardiac death was also discussed given persistent LV dysfunction, patient had declined this at the last visit.    Echo 24: EF improved to 45-50%.      Overall patient reports that she has been doing very well.    She denies any chest pain, chest pressure, abnormal shortness of breath. Patient denies symptoms of orthopnea/PND, abnormal lower extremity swelling.  No palpitations.    She does not feel limited.   Weight stable around 246 pounds.    BP today in clinic 106/72 mmHg.    Patient lives alone, has 2 children who are 10 years apart, 3 grandchildren.  She is a retired , her  was an .         ROS:  Skin:        Eyes:       ENT:       Respiratory:       Cardiovascular:       Gastroenterology:      Genitourinary:        Musculoskeletal:       Neurologic:       Psychiatric:       Heme/Lymph/Imm:       Endocrine:         PAST MEDICAL HISTORY:  Past Medical History:   Diagnosis Date    Ischemic cardiomyopathy 01/26/2024    NSTEMI (non-ST elevated myocardial infarction) (H) 06/01/2023    Paroxysmal atrial fibrillation (H) 05/25/2023       PAST SURGICAL HISTORY:  Past Surgical History:   Procedure Laterality Date    APPENDECTOMY      CV CORONARY ANGIOGRAM N/A 5/30/2023    Procedure: Coronary Angiogram;  Surgeon: Bryant Simental MD;  Location:  [facility]    CV PCI N/A 5/30/2023    Procedure: Percutaneous Coronary Intervention;  Surgeon: Bryant Simental MD;  Location: [facility]    ENT SURGERY      GYN SURGERY         SOCIAL HISTORY:  Social History     Socioeconomic History    Marital status:      Spouse name: None    Number of children: None    Years of education: None    Highest education level: None   Tobacco Use    Smoking status: Never    Smokeless tobacco: Never   Substance and Sexual Activity    Alcohol use: No    Drug use: No     Social Determinants of Health     Financial Resource Strain: Low Risk  (2/6/2024)    Received from [employer] [facility]    Financial Resource Strain     Difficulty of Paying Living Expenses: 3   Food Insecurity: No Food Insecurity (2/6/2024)    Received from [employer] [facility]    Food Insecurity     Worried About Running Out of Food in the Last Year: 1   Transportation Needs: No Transportation Needs (2/6/2024)    Received from [employer] [facility]    Transportation Needs     Lack of Transportation (Medical): 1   Social Connections: Socially Integrated (2/6/2024)    Received from [employer] [facility]    Social Connections     Frequency of Communication with Friends and Family: 0   Housing Stability: Low Risk  (2/6/2024)    Received from [employer]  [facility]    Housing Stability     Unable to Pay for Housing in the Last Year: 1       FAMILY HISTORY:  Family History   Problem Relation Age of Onset    Heart Disease Mother         Per pt something heart related- unsure what    Heart Disease Father         Per pt something heart related- unsure what       MEDS:   Current Outpatient Medications   Medication Sig Dispense Refill    apixaban ANTICOAGULANT (ELIQUIS) 5 MG tablet Take 1 tablet (5 mg) by mouth 2 times daily 180 tablet 3    atorvastatin (LIPITOR) 40 MG tablet Take 1 tablet (40 mg) by mouth daily 90 tablet 1    clopidogrel (PLAVIX) 75 MG tablet Take 1 tablet (75 mg) by mouth daily 30 tablet 0    empagliflozin (JARDIANCE) 10 MG TABS tablet Take 1 tablet (10 mg) by mouth daily Pt taking as of 8/22/23, on [facility] pt assistance through 12/31/2023 90 tablet 3    famotidine (PEPCID) 20 MG tablet Take 1 tablet (20 mg) by mouth 2 times daily (Patient taking differently: Take 20 mg by mouth 2 times daily as needed)      levothyroxine (SYNTHROID/LEVOTHROID) 112 MCG tablet Take 112 mcg by mouth every morning (before breakfast)      metoprolol succinate ER (TOPROL XL) 50 MG 24 hr tablet Take 3 tablets (150 mg) by mouth daily AND 2 tablets (100 mg) at bedtime. 150 tablet 11    sacubitril-valsartan (ENTRESTO) 24-26 MG per tablet Take 1 tablet by mouth 2 times daily 180 tablet 3     No current facility-administered medications for this visit.       ALLERGIES:   Allergies   Allergen Reactions    Digoxin Nausea and Vomiting and Diarrhea    Codeine Sulfate Nausea    Penicillins Other (See Comments)     Mother had severe anaphylaxis - was told as child never take PCN       PHYSICAL EXAM:  Vitals: /72 (BP Location: Right arm, Patient Position: Sitting, Cuff Size: Adult Large)   Pulse 78   Ht 1.626 m (5' 4\")   Wt 111.8 kg (246 lb 6.4 oz)   SpO2 97%   BMI 42.29 kg/m    Constitutional:  cooperative, alert and oriented, well developed, well nourished, in no " acute distress obese      Skin:  warm and dry to the touch, no apparent skin lesions or masses noted        Head:  normocephalic, no masses or lesions        Eyes:  pupils equal and round;conjunctivae and lids unremarkable;sclera white        ENT:  no pallor or cyanosis poor dentition      Neck:  JVP normal        Respiratory:  clear to auscultation        Cardiac:   irregularly irregular rhythm                GI:  abdomen soft;BS normoactive obese      Vascular:                                        Extremities and Musculoskeletal:           Neurological:  no gross motor deficits;affect appropriate              LABS/DATA:  I reviewed the following:  Echo 4/26/24:  Interpretation Summary  Left ventricular systolic function is borderline reduced.  Regional wall motion abnormalities cannot be excluded due to limited  visualization. Contrast would enhance regional wall motion analysis.  The visual ejection fraction is 45-50%.  The study was technically limited. Technically difficult, suboptimal study.      Echo 10/20/23:  Interpretation Summary  The visual ejection fraction is 30-35%. Possibly slightly improved compared  with most recent study.  There is moderate global hypokinesia of the left ventricle.  Mildly decreased right ventricular systolic function  There is mild-moderate biatrial enlargement.  There is mild (1+) mitral regurgitation.  The study was technically difficult.        Component      Latest Ref Rng 9/26/2023  1:39 PM 4/30/2024  8:57 AM   Sodium      135 - 145 mmol/L 137  138    Potassium      3.4 - 5.3 mmol/L 4.2  4.1    Chloride      98 - 107 mmol/L 102  103    Carbon Dioxide (CO2)      22 - 29 mmol/L 25  21 (L)    Anion Gap      7 - 15 mmol/L 10  14    Urea Nitrogen      8.0 - 23.0 mg/dL 15.7  13.7    Creatinine      0.51 - 0.95 mg/dL 0.95  0.98 (H)    GFR Estimate      >60 mL/min/1.73m2 61  58 (L)    Calcium      8.8 - 10.2 mg/dL 9.8  9.6    Glucose      70 - 99 mg/dL 130 (H)  80    WBC      4.0  - 11.0 10e3/uL  11.0    RBC Count      3.80 - 5.20 10e6/uL  5.00    Hemoglobin      11.7 - 15.7 g/dL  15.9 (H)    Hematocrit      35.0 - 47.0 %  49.1 (H)    MCV      78 - 100 fL  98    MCH      26.5 - 33.0 pg  31.8    MCHC      31.5 - 36.5 g/dL  32.4    RDW      10.0 - 15.0 %  13.4    Platelet Count      150 - 450 10e3/uL  378       Legend:  (H) High  (L) Low            ASSESSMENT/PLAN:  HFrEF, now mildly reduced.    NYHA I-II  - Mixed ICM and NICM  - Echo 10/2023: LVEF 30-35%  - Echo 4/2024: EF 45-50%  - GDMT:   * Continue Toprol XL, Entresto, Jardiance  - Euvolemic on exam.  Weight stable 246 pounds.  Not on a diuretic, but is on Entresto and Jardiance as noted above  - Fortunately, EF has improved, so ICD not recommended at this time      Persistent atrial fibrillation  - rate controlled on Toprol  mg in AM and 100 mg in PM  - asymptomatic  - on apixaban and clopidogrel        CAD  - s/p PCI to D1 and DCx 5/30/2023, residual high grade RPL lesion medically managed  - No angina  - Continue clopidogrel for 12 months post PCI, then can continue apixaban monotherapy  - Atorvastatin 40 mg. LDL at goal, 47        HTN  - controlled        Obesity           Follow up:  3 months with Shyann Abarca PA-C with BMP prior.  Clopidogrel will likely be stopped at that visit. Since her EF has improved, she can be seen in general clinic          Matthew Galvan PA-C

## 2024-04-30 NOTE — PATIENT INSTRUCTIONS
Call [facility] nurse for any questions or concerns Mon-Fri 8am-4pm:                                                 #[phone]                                       For concerns after hours:                                               #[phone]      Medication changes: **    Plan from today: **    Lab results: see attached:   Component      Latest Ref Rng 9/26/2023  1:39 PM 4/30/2024  8:57 AM   Sodium      135 - 145 mmol/L 137  138    Potassium      3.4 - 5.3 mmol/L 4.2  4.1    Chloride      98 - 107 mmol/L 102  103    Carbon Dioxide (CO2)      22 - 29 mmol/L 25  21 (L)    Anion Gap      7 - 15 mmol/L 10  14    Urea Nitrogen      8.0 - 23.0 mg/dL 15.7  13.7    Creatinine      0.51 - 0.95 mg/dL 0.95  0.98 (H)    GFR Estimate      >60 mL/min/1.73m2 61  58 (L)    Calcium      8.8 - 10.2 mg/dL 9.8  9.6    Glucose      70 - 99 mg/dL 130 (H)  80    WBC      4.0 - 11.0 10e3/uL  11.0    RBC Count      3.80 - 5.20 10e6/uL  5.00    Hemoglobin      11.7 - 15.7 g/dL  15.9 (H)    Hematocrit      35.0 - 47.0 %  49.1 (H)    MCV      78 - 100 fL  98    MCH      26.5 - 33.0 pg  31.8    MCHC      31.5 - 36.5 g/dL  32.4    RDW      10.0 - 15.0 %  13.4    Platelet Count      150 - 450 10e3/uL  378       Legend:  (H) High  (L) Low

## 2024-04-30 NOTE — LETTER
2024    Royer Avenido  [address] [place] [address]    RE: Fanny Myers       Dear Colleague,     I had the pleasure of seeing Fanny Myers in the [facility].      CARDIOLOGY CLINIC PROGRESS NOTE    DOS: 2024      Fanny Myers  : 1945, 79 year old  MRN: [id number]      Primary cardiologist: Dr. 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       Patient has a history of heart failure with reduced ejection fraction, LVEF 30 to 35%, attributed to mixed ischemic and nonischemic cardiomyopathy, from atrial fibrillation/tachycardia.  Patient has a history of coronary artery disease status post PCI to D1 and distal left circumflex 2023, there was a plan for staged PCI to an RPL lesion.  However when patient presented for staged PCI, her dyspnea had resolved, and so after discussion with Dr. Flores, who is the interventionalist at the time, the decision was made to hold off in favor of medical management and close monitoring.  Since then, guideline directed medical therapy has been adjusted.  The issue of ICD for primary prevention of sudden cardiac death was also discussed given persistent LV dysfunction, patient had declined this at the last visit.    Echo 24: EF improved to 45-50%.      Overall patient reports that she has been doing very well.    She denies any chest pain, chest pressure, abnormal shortness of breath. Patient denies symptoms of orthopnea/PND, abnormal lower extremity swelling.  No palpitations.    She does not feel limited.   Weight stable around 246 pounds.    BP today in clinic 106/72 mmHg.    Patient lives alone, has 2 children who are 10 years apart, 3  grandchildren.  She is a retired , her  was an .         ROS:  Skin:        Eyes:       ENT:       Respiratory:       Cardiovascular:       Gastroenterology:      Genitourinary:       Musculoskeletal:       Neurologic:       Psychiatric:       Heme/Lymph/Imm:       Endocrine:         PAST MEDICAL HISTORY:  Past Medical History:   Diagnosis Date    Ischemic cardiomyopathy 01/26/2024    NSTEMI (non-ST elevated myocardial infarction) (H) 06/01/2023    Paroxysmal atrial fibrillation (H) 05/25/2023       PAST SURGICAL HISTORY:  Past Surgical History:   Procedure Laterality Date    APPENDECTOMY      CV CORONARY ANGIOGRAM N/A 5/30/2023    Procedure: Coronary Angiogram;  Surgeon: Bryant Simental MD;  Location:  [facility] CARDI[facility] CATH LAB    CV PCI N/A 5/30/2023    Procedure: Percutaneous Coronary Intervention;  Surgeon: Bryant Simental MD;  Location:  [facility] CARD[facility] CATH LAB    ENT SURGERY      GYN SURGERY         SOCIAL HISTORY:  Social History     Socioeconomic History    Marital status:      Spouse name: None    Number of children: None    Years of education: None    Highest education level: None   Tobacco Use    Smoking status: Never    Smokeless tobacco: Never   Substance and Sexual Activity    Alcohol use: No    Drug use: No     Social Determinants of Health     Financial Resource Strain: Low Risk  (2/6/2024)    Received from [employer]    Financial Resource Strain     Difficulty of Paying Living Expenses: 3   Food Insecurity: No Food Insecurity (2/6/2024)    Received from [employer]    Food Insecurity     Worried About Running Out of Food in the Last Year: 1   Transportation Needs: No Transportation Needs (2/6/2024)    Received from [employer] [facility]    Transportation Needs     Lack of Transportation (Medical): 1   Social Connections: Socially Integrated (2/6/2024)    [facility]    Social Connections     Frequency of Communication with Friends and Family: 0   Housing Stability: Low Risk  (2/6/2024)    Received from [employer] [facility]    Housing Stability     Unable to Pay for Housing in the Last Year: 1       FAMILY HISTORY:  Family History   Problem Relation Age of Onset    Heart Disease Mother         Per pt something heart related- unsure what    Heart Disease Father         Per pt something heart related- unsure what       MEDS:   Current Outpatient Medications   Medication Sig Dispense Refill    apixaban ANTICOAGULANT (ELIQUIS) 5 MG tablet Take 1 tablet (5 mg) by mouth 2 times daily 180 tablet 3    atorvastatin (LIPITOR) 40 MG tablet Take 1 tablet (40 mg) by mouth daily 90 tablet 1    clopidogrel (PLAVIX) 75 MG tablet Take 1 tablet (75 mg) by mouth daily 30 tablet 0    empagliflozin (JARDIANCE) 10 MG TABS tablet Take 1 tablet (10 mg) by mouth daily Pt taking as of 8/22/23, on  cares pt assistance through 12/31/2023 90 tablet 3    famotidine (PEPCID) 20 MG tablet Take 1 tablet (20 mg) by mouth 2 times daily (Patient taking differently: Take 20 mg by mouth 2 times daily as needed)      levothyroxine (SYNTHROID/LEVOTHROID) 112 MCG tablet Take 112 mcg by mouth every morning (before breakfast)      metoprolol succinate ER (TOPROL XL) 50 MG 24 hr tablet Take 3 tablets (150 mg) by mouth daily AND 2 tablets (100 mg) at bedtime. 150 tablet 11    sacubitril-valsartan (ENTRESTO) 24-26 MG per tablet Take 1 tablet by mouth 2 times daily 180 tablet 3     No current facility-administered medications for this visit.       ALLERGIES:   Allergies   Allergen Reactions    Digoxin Nausea and Vomiting and Diarrhea    Codeine Sulfate Nausea    Penicillins Other (See Comments)     Mother had severe anaphylaxis - was told as child never take PCN       PHYSICAL EXAM:  Vitals: /72 (BP Location: Right arm, Patient Position: Sitting,  "Cuff Size: Adult Large)   Pulse 78   Ht 1.626 m (5' 4\")   Wt 111.8 kg (246 lb 6.4 oz)   SpO2 97%   BMI 42.29 kg/m    Constitutional:  cooperative, alert and oriented, well developed, well nourished, in no acute distress obese      Skin:  warm and dry to the touch, no apparent skin lesions or masses noted        Head:  normocephalic, no masses or lesions        Eyes:  pupils equal and round;conjunctivae and lids unremarkable;sclera white        ENT:  no pallor or cyanosis poor dentition      Neck:  JVP normal        Respiratory:  clear to auscultation        Cardiac:   irregularly irregular rhythm                GI:  abdomen soft;BS normoactive obese      Vascular:                                        Extremities and Musculoskeletal:           Neurological:  no gross motor deficits;affect appropriate              LABS/DATA:  I reviewed the following:  Echo 4/26/24:  Interpretation Summary  Left ventricular systolic function is borderline reduced.  Regional wall motion abnormalities cannot be excluded due to limited  visualization. Contrast would enhance regional wall motion analysis.  The visual ejection fraction is 45-50%.  The study was technically limited. Technically difficult, suboptimal study.      Echo 10/20/23:  Interpretation Summary  The visual ejection fraction is 30-35%. Possibly slightly improved compared  with most recent study.  There is moderate global hypokinesia of the left ventricle.  Mildly decreased right ventricular systolic function  There is mild-moderate biatrial enlargement.  There is mild (1+) mitral regurgitation.  The study was technically difficult.        Component      Latest Ref Rng 9/26/2023  1:39 PM 4/30/2024  8:57 AM   Sodium      135 - 145 mmol/L 137  138    Potassium      3.4 - 5.3 mmol/L 4.2  4.1    Chloride      98 - 107 mmol/L 102  103    Carbon Dioxide (CO2)      22 - 29 mmol/L 25  21 (L)    Anion Gap      7 - 15 mmol/L 10  14    Urea Nitrogen      8.0 - 23.0 mg/dL " 15.7  13.7    Creatinine      0.51 - 0.95 mg/dL 0.95  0.98 (H)    GFR Estimate      >60 mL/min/1.73m2 61  58 (L)    Calcium      8.8 - 10.2 mg/dL 9.8  9.6    Glucose      70 - 99 mg/dL 130 (H)  80    WBC      4.0 - 11.0 10e3/uL  11.0    RBC Count      3.80 - 5.20 10e6/uL  5.00    Hemoglobin      11.7 - 15.7 g/dL  15.9 (H)    Hematocrit      35.0 - 47.0 %  49.1 (H)    MCV      78 - 100 fL  98    MCH      26.5 - 33.0 pg  31.8    MCHC      31.5 - 36.5 g/dL  32.4    RDW      10.0 - 15.0 %  13.4    Platelet Count      150 - 450 10e3/uL  378       Legend:  (H) High  (L) Low            ASSESSMENT/PLAN:  HFrEF, now mildly reduced.    NYHA I-II  - Mixed ICM and NICM  - Echo 10/2023: LVEF 30-35%  - Echo 4/2024: EF 45-50%  - GDMT:   * Continue Toprol XL, Entresto, Jardiance  - Euvolemic on exam.  Weight stable 246 pounds.  Not on a diuretic, but is on Entresto and Jardiance as noted above  - Fortunately, EF has improved, so ICD not recommended at this time      Persistent atrial fibrillation  - rate controlled on Toprol  mg in AM and 100 mg in PM  - asymptomatic  - on apixaban and clopidogrel        CAD  - s/p PCI to D1 and DCx 5/30/2023, residual high grade RPL lesion medically managed  - No angina  - Continue clopidogrel for 12 months post PCI, then can continue apixaban monotherapy  - Atorvastatin 40 mg. LDL at goal, 47        HTN  - controlled        Obesity           Follow up:  3 months with Shyann Abarca PA-C with BMP prior.  Clopidogrel will likely be stopped at that visit. Since her EF has improved, she can be seen in general clinic          Matthew Galvan PA-C      Thank you for allowing me to participate in the care of your patient.      Sincerely,     Matthew Galvan PA-C     [facility]  cc:   Shawn Craig MD  [address]  [place] [id number]

## 2024-05-06 ENCOUNTER — TELEPHONE (OUTPATIENT)
Dept: CARDIOLOGY | Facility: CLINIC | Age: 79
End: 2024-05-06
Payer: COMMERCIAL

## 2024-05-06 DIAGNOSIS — R79.89 ELEVATED TROPONIN: ICD-10-CM

## 2024-05-06 RX ORDER — ATORVASTATIN CALCIUM 40 MG/1
40 TABLET, FILM COATED ORAL DAILY
Qty: 90 TABLET | Refills: 3 | Status: SHIPPED | OUTPATIENT
Start: 2024-05-06

## 2024-05-06 NOTE — TELEPHONE ENCOUNTER
[facility] Call Center    Phone Message    May a detailed message be left on voicemail: yes     Reason for Call: Medication Refill Request    Has the patient contacted the pharmacy for the refill? Yes   Name of medication being requested: atorvastatin (LIPITOR) 40 MG tablet   Provider who prescribed the medication: Shyann Abarca  Pharmacy:    [facility] [address] [place] [address]    Date medication is needed: MONE Bang with [facility] called stating they have sent in a request on 04/27, but have not received any responses for refills or if it has been denied. Please review and send orders as needed. Thank you!    Action Taken: Other: Cardiology    Travel Screening: Not Applicable    Thank you!  [facility]

## 2024-06-04 ENCOUNTER — TELEPHONE (OUTPATIENT)
Dept: CARDIOLOGY | Facility: CLINIC | Age: 79
End: 2024-06-04
Payer: COMMERCIAL

## 2024-06-04 DIAGNOSIS — Z95.5 STATUS POST INSERTION OF DRUG ELUTING CORONARY ARTERY STENT: ICD-10-CM

## 2024-06-04 RX ORDER — CLOPIDOGREL BISULFATE 75 MG/1
75 TABLET ORAL DAILY
Qty: 30 TABLET | Refills: 9 | Status: SHIPPED | OUTPATIENT
Start: 2024-06-04 | End: 2024-07-29

## 2024-06-04 NOTE — TELEPHONE ENCOUNTER
[facility] Call Center    Phone Message    May a detailed message be left on voicemail: yes     Reason for Call: Medication Refill Request    Has the patient contacted the pharmacy for the refill? Yes   Name of medication being requested: clopidogrel (PLAVIX) 75 MG tablet   Provider who prescribed the medication: Shawn Craig MD   Pharmacy:   [facility] [address] [place] [address]     Date medication is needed: asap     Action Taken: Other: cardio    Travel Screening: Not Applicable    Thank you!  [facility]

## 2024-07-29 DIAGNOSIS — Z95.5 STATUS POST INSERTION OF DRUG ELUTING CORONARY ARTERY STENT: ICD-10-CM

## 2024-07-29 RX ORDER — CLOPIDOGREL BISULFATE 75 MG/1
75 TABLET ORAL DAILY
Qty: 90 TABLET | Refills: 3 | Status: SHIPPED | OUTPATIENT
Start: 2024-07-29 | End: 2024-08-15

## 2024-08-15 ENCOUNTER — LAB (OUTPATIENT)
Dept: LAB | Facility: CLINIC | Age: 79
End: 2024-08-15
Payer: COMMERCIAL

## 2024-08-15 ENCOUNTER — OFFICE VISIT (OUTPATIENT)
Dept: CARDIOLOGY | Facility: CLINIC | Age: 79
End: 2024-08-15
Attending: PHYSICIAN ASSISTANT
Payer: COMMERCIAL

## 2024-08-15 VITALS
BODY MASS INDEX: 42 KG/M2 | WEIGHT: 252.1 LBS | HEART RATE: 86 BPM | DIASTOLIC BLOOD PRESSURE: 78 MMHG | SYSTOLIC BLOOD PRESSURE: 108 MMHG | HEIGHT: 65 IN | OXYGEN SATURATION: 98 %

## 2024-08-15 DIAGNOSIS — E78.5 HYPERLIPIDEMIA LDL GOAL <70: Primary | ICD-10-CM

## 2024-08-15 DIAGNOSIS — I50.23 ACUTE ON CHRONIC SYSTOLIC HEART FAILURE (H): ICD-10-CM

## 2024-08-15 LAB
ANION GAP SERPL CALCULATED.3IONS-SCNC: 14 MMOL/L (ref 7–15)
BUN SERPL-MCNC: 15.5 MG/DL (ref 8–23)
CALCIUM SERPL-MCNC: 9.9 MG/DL (ref 8.8–10.4)
CHLORIDE SERPL-SCNC: 99 MMOL/L (ref 98–107)
CREAT SERPL-MCNC: 0.93 MG/DL (ref 0.51–0.95)
EGFRCR SERPLBLD CKD-EPI 2021: 62 ML/MIN/1.73M2
GLUCOSE SERPL-MCNC: 93 MG/DL (ref 70–99)
HCO3 SERPL-SCNC: 20 MMOL/L (ref 22–29)
POTASSIUM SERPL-SCNC: 4.8 MMOL/L (ref 3.4–5.3)
SODIUM SERPL-SCNC: 133 MMOL/L (ref 135–145)

## 2024-08-15 PROCEDURE — 36415 COLL VENOUS BLD VENIPUNCTURE: CPT | Performed by: PHYSICIAN ASSISTANT

## 2024-08-15 PROCEDURE — 80048 BASIC METABOLIC PNL TOTAL CA: CPT | Performed by: PHYSICIAN ASSISTANT

## 2024-08-15 PROCEDURE — 99215 OFFICE O/P EST HI 40 MIN: CPT | Performed by: INTERNAL MEDICINE

## 2024-08-15 PROCEDURE — G2211 COMPLEX E/M VISIT ADD ON: HCPCS | Performed by: INTERNAL MEDICINE

## 2024-08-15 NOTE — LETTER
8/15/2024    Royer Avenido  [address]  [place] [address]    RE: Fanny Myers       Dear Colleague,     I had the pleasure of [facility] F[facility].  Cardiology Clinic Progress Note  Fanny Myers MRN# [id number]   YOB: 1945 Age: 79 year old   Primary Cardiologist: Dr. Craig Reason for visit: CORE follow-up            Assessment and Plan:   Fanny Myers is a very pleasant 79 year old female who is here today for CORE follow-up.     1. 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  - LVEF: 45 to 50% on echo 4/2024  - NYHA class II  - Etiology: Mixed ischemic and nonischemic (tachycardia mediated)  - Fluid status: Euvolemic  - Diuretic regimen: none   - Guideline directed medical therapy:  - Beta blocker: Toprol- mg AM and 100 mg PM   - ACEI/ARB/ARNI: Entresto 24/26 mg twice daily   - Aldactone antagonist: none  - SGLT2 inhibitor: Jardiance 10 mg once daily  -Counseled patient on fluid and sodium restriction  2.  Coronary artery disease status post PCI to D1 and distal circumflex 5/30/2023.  Has residual high-grade RPL lesion, medically managed.  3.  Persistent atrial fibrillation, rate controlled with Toprol-XL.  On Eliquis 5 mg twice daily for cardioembolic risk reduction in the setting of elevated [id number] score of 5 (age x2, HTN, CAD, CM).  4.  Hypertension, BP well-controlled on current regimen  5.  Hyperlipidemia, on atorvastatin.    Changes today: Discontinue Plavix.    Ms. Myers continues to feel well from a cardiac perspective.  Denies any symptoms concerning for angina or decompensated heart failure.  Tolerating all cardiac medications well without apparent side effects.  Blood pressure and heart rate are optimally controlled on current medication regimen.     Last coronary intervention was 5/2023, therefore, okay to discontinue Plavix and continue Eliquis monotherapy.    Now that LVEF has  recovered, no longer a candidate for primary prevention ICD implant.  Additionally, given recovered EF, [place] [facility] [place] Patient can now be seen as a general cardiology patient.     Recommend patient follow-up with Dr. Craig in 6 months with labs, sooner if needed.  Repeat echocardiogram ~4/2025.    Key Parnell PA-C  [facility]  Pager: [phone]          History of Presenting Illness:    Fanny Myers is a very pleasant 79 year old female with a history of hypertension, hypothyroidism, paroxysmal atrial fibrillation, heart failure with reduced ejection fraction secondary to mixed ischemic and nonischemic cardiomyopathy, and coronary artery disease status post PCI to D1 and distal left circumflex 5/30/2023. There was a plan for staged PCI to an RPL lesion.  However, when patient presented for staged PCI, her dyspnea had resolved.  After discussion, decision was made to hold off on procedure in favor of ongoing medical management and close monitoring.    With attempts at rate controlling her atrial fibrillation, and intervention/ongoing medical management of her coronary artery disease, LVEF did not improve.  ICD implant for primary prevention was recommended, patient declined.  Additionally, initiation of amiodarone with subsequent cardioversion was recommended, patient declined.  Thus, Toprol-XL was uptitrated further in an attempt to optimize her rate control.    At subsequent follow-ups, patient has continued to decline further intervention as she has been feeling well from a cardiac perspective.  Most recent echocardiogram 4/26/2024 revealed improvement in LVEF to 45 to 50%, normal RV size and function, mild MR. At her last visit, 4/2024, no medication changes were made and she was advised to follow-up in 3 months.    Patient is here today for CORE follow-up. Ms. Myers continues to feel well from a cardiac perspective.  Denies chest pain, palpitations, lightheadedness,  dizziness, near-syncope or syncope.  No shortness of breath, orthopnea or PND.  No bleeding concerns.    Her only complaint today is of bilateral knee pain secondary to osteoarthritis.    Takes no medications daily as prescribed.  Blood pressure 108/78 and heart rate 86 in clinic today.  Is no longer weighing herself at home.  Walks with a walker.        Social History       Social History     Socioeconomic History     Marital status:      Spouse name: Not on file     Number of children: Not on file     Years of education: Not on file     Highest education level: Not on file   Occupational History     Not on file   Tobacco Use     Smoking status: Never     Smokeless tobacco: Never   Substance and Sexual Activity     Alcohol use: No     Drug use: No     Sexual activity: Not on file   Other Topics Concern     Not on file   Social History Narrative     Not on file     Social Determinants of Health     Financial Resource Strain: Low Risk  (2/6/2024)    [facility]    Financial Resource Strain      Difficulty of Paying Living Expenses: 3      Difficulty of Paying Living Expenses: Not on file   Food Insecurity: No Food Insecurity (2/6/2024)    [facility]    Food Insecurity      Worried About Running Out of Food in the Last Year: 1   Transportation Needs: No Transportation Needs (2/6/2024)    Received from [facility]    Transportation Needs      Lack of Transportation (Medical): 1   Physical Activity: Not on file   Stress: Not on file   Social Connections: Socially Integrated (2/6/2024)    Received from [facility]    Social Connections      Frequency of Communication with Friends and Family: 0   Interpersonal Safety: Not on file   Housing Stability: Low Risk  (2/6/2024)    Received from [facility]    Housing Stability       "Unable to Pay for Housing in the Last Year: 1            Review of Systems:   Please see HPI         Physical Exam:   Vitals: /78 (BP Location: Right arm, Patient Position: Sitting, Cuff Size: Adult Regular)   Pulse 86   Ht 1.651 m (5' 5\")   Wt 114.4 kg (252 lb 1.6 oz)   SpO2 98%   BMI 41.95 kg/m     Wt Readings from Last 4 Encounters:   04/30/24 111.8 kg (246 lb 6.4 oz)   01/26/24 111.1 kg (245 lb)   10/25/23 110.5 kg (243 lb 8 oz)   09/26/23 111.4 kg (245 lb 11.2 oz)     GEN: well nourished, in no acute distress.  HEENT:  Pupils equal, round. Sclerae nonicteric.   NECK: Supple, no masses appreciated.  No JVD  C/V: Irregularly irregular rhythm, controlled rate.  No murmur appreciated  RESP: Respirations are unlabored. Clear to auscultation bilaterally without wheezing, rales, or rhonchi.  GI: Abdomen soft, nontender.  EXTREM: No LE edema.  NEURO: Alert and oriented, cooperative.  SKIN: Warm and dry.        Data:   LIPID RESULTS:  Lab Results   Component Value Date    CHOL 135 05/30/2023    HDL 70 05/30/2023    LDL 47 05/30/2023    TRIG 91 05/30/2023     LIVER ENZYME RESULTS:  Lab Results   Component Value Date    AST 33 05/25/2023    ALT 26 05/25/2023     CBC RESULTS:  Lab Results   Component Value Date    WBC 11.0 04/30/2024    WBC 14.2 (H) 04/27/2013    RBC 5.00 04/30/2024    RBC 4.46 04/27/2013    HGB 15.9 (H) 04/30/2024    HGB 14.0 04/27/2013    HCT 49.1 (H) 04/30/2024    HCT 42.1 04/27/2013    MCV 98 04/30/2024    MCV 94 04/27/2013    MCH 31.8 04/30/2024    MCH 31.4 04/27/2013    MCHC 32.4 04/30/2024    MCHC 33.3 04/27/2013    RDW 13.4 04/30/2024    RDW 12.7 04/27/2013     04/30/2024     04/27/2013     BMP RESULTS:  Lab Results   Component Value Date     04/30/2024     04/27/2013    POTASSIUM 4.1 04/30/2024    POTASSIUM 4.5 04/27/2013    CHLORIDE 103 04/30/2024    CHLORIDE 101 04/27/2013    CO2 21 (L) 04/30/2024    CO2 29 04/27/2013    ANIONGAP 14 04/30/2024    ANIONGAP " "10.2 04/27/2013    GLC 80 04/30/2024     (H) 04/27/2013    BUN 13.7 04/30/2024    BUN 19 04/27/2013    CR 0.98 (H) 04/30/2024    CR 1.21 (H) 04/27/2013    GFRESTIMATED 58 (L) 04/30/2024    GFRESTIMATED 44 (L) 04/27/2013    GFRESTBLACK 54 (L) 04/27/2013    DANIE 9.6 04/30/2024    DANIE 9.5 04/27/2013      A1C RESULTS:  No results found for: \"A1C\"  INR RESULTS:  Lab Results   Component Value Date    INR 1.15 06/21/2023            Medications     Current Outpatient Medications   Medication Sig Dispense Refill     apixaban ANTICOAGULANT (ELIQUIS) 5 MG tablet Take 1 tablet (5 mg) by mouth 2 times daily 180 tablet 3     atorvastatin (LIPITOR) 40 MG tablet Take 1 tablet (40 mg) by mouth daily 90 tablet 3     clopidogrel (PLAVIX) 75 MG tablet Take 1 tablet (75 mg) by mouth daily 90 tablet 3     empagliflozin (JARDIANCE) 10 MG TABS tablet Take 1 tablet (10 mg) by mouth daily Pt taking as of 8/22/23, on [facility] cares pt assistance through 12/31/2023 90 tablet 3     famotidine (PEPCID) 20 MG tablet Take 1 tablet (20 mg) by mouth 2 times daily (Patient taking differently: Take 20 mg by mouth 2 times daily as needed)       levothyroxine (SYNTHROID/LEVOTHROID) 112 MCG tablet Take 112 mcg by mouth every morning (before breakfast)       metoprolol succinate ER (TOPROL XL) 50 MG 24 hr tablet Take 3 tablets (150 mg) by mouth daily AND 2 tablets (100 mg) at bedtime. 150 tablet 11     sacubitril-valsartan (ENTRESTO) 24-26 MG per tablet Take 1 tablet by mouth 2 times daily 180 tablet 3          Past Medical History     Past Medical History:   Diagnosis Date     Ischemic cardiomyopathy 01/26/2024     NSTEMI (non-ST elevated myocardial infarction) (H) 06/01/2023     Paroxysmal atrial fibrillation (H) 05/25/2023     Past Surgical History:   Procedure Laterality Date     APPENDECTOMY       CV CORONARY ANGIOGRAM N/A 5/30/2023    Procedure: Coronary Angiogram;  Surgeon: Bryant Simental MD;  Location:  HEART CARDIAC CATH LAB     CV PCI N/A " 5/30/2023    Procedure: Percutaneous Coronary Intervention;  Surgeon: Bryant Simental MD;  Location:  HEART CARDIAC CATH LAB     ENT SURGERY       GYN SURGERY       Family History   Problem Relation Age of Onset     Heart Disease Mother         Per pt something heart related- unsure what     Heart Disease Father         Per pt something heart related- unsure what            Allergies   Digoxin, Codeine sulfate, and Penicillins    The longitudinal plan of care for the diagnosis(es)/condition(s) as documented were addressed during this visit. Due to the added complexity in care, I will continue to support Fanny in the subsequent management and with ongoing continuity of care.     40 minutes spent on the date of the encounter doing chart review, history and exam, documentation and further activities as noted above    MARY August [facility]  Pager: [phone]      Thank you for allowing me to participate in the care of your patient.      Sincerely,     MARY August [facility]  cc:   No referring provider defined for this encounter.

## 2024-08-15 NOTE — PATIENT INSTRUCTIONS
Today you had a visit in the CORE clinic. CORE stands for Cardiomyopathy Optimization Rehabilitation Education. The CORE clinic will teach and help you to manage your heart failure and keep you out of the hospital. Call the CORE nurse for any questions or concerns at [phone]      Plan:  1. Medication changes:     DISCONTINUE Plavix.     2. Follow up with Dr. Craig in 4-6 months, sooner if needed.   -Scheduling phone number: [phone]    It was great seeing you today!    Key Parnell PA-C  Physician Assistant  [facility] - [facility]    Lab results:   Component      Latest Ref Rng 4/30/2024  8:57 AM 8/15/2024  9:11 AM   Sodium      135 - 145 mmol/L 138  133 (L)    Potassium      3.4 - 5.3 mmol/L 4.1  4.8    Chloride      98 - 107 mmol/L 103  99    Carbon Dioxide (CO2)      22 - 29 mmol/L 21 (L)  20 (L)    Anion Gap      7 - 15 mmol/L 14  14    Urea Nitrogen      8.0 - 23.0 mg/dL 13.7  15.5    Creatinine      0.51 - 0.95 mg/dL 0.98 (H)  0.93    GFR Estimate      >60 mL/min/1.73m2 58 (L)  62    Calcium      8.8 - 10.4 mg/dL 9.6  9.9    Glucose      70 - 99 mg/dL 80  93       Legend:  (L) Low  (H) High

## 2024-08-15 NOTE — PROGRESS NOTES
Cardiology Clinic Progress Note  Fanny Myers MRN# [id number]   YOB: 1945 Age: 79 year old   Primary Cardiologist: Dr. Craig Reason for visit: CORE follow-up            Assessment and Plan:   Fanny Myers is a very pleasant 79 year old female who is here today for CORE follow-up.     1. 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  - LVEF: 45 to 50% on echo 4/2024  - NYHA class II  - Etiology: Mixed ischemic and nonischemic (tachycardia mediated)  - Fluid status: Euvolemic  - Diuretic regimen: none   - Guideline directed medical therapy:  - Beta blocker: Toprol- mg AM and 100 mg PM   - ACEI/ARB/ARNI: Entresto 24/26 mg twice daily   - Aldactone antagonist: none  - SGLT2 inhibitor: Jardiance 10 mg once daily  -Counseled patient on fluid and sodium restriction  2.  Coronary artery disease status post PCI to D1 and distal circumflex 5/30/2023.  Has residual high-grade RPL lesion, medically managed.  3.  Persistent atrial fibrillation, rate controlled with Toprol-XL.  On Eliquis 5 mg twice daily for cardioembolic risk reduction in the setting of elevated [id number] score of 5 (age x2, HTN, CAD, CM).  4.  Hypertension, BP well-controlled on current regimen  5.  Hyperlipidemia, on atorvastatin.    Changes today: Discontinue Plavix.    Ms. Myers continues to feel well from a cardiac perspective.  Denies any symptoms concerning for angina or decompensated heart failure.  Tolerating all cardiac medications well without apparent side effects.  Blood pressure and heart rate are optimally controlled on current medication regimen.     Last coronary intervention was 5/2023, therefore, okay to discontinue Plavix and continue Eliquis monotherapy.    Now that LVEF has recovered, no longer a candidate for primary prevention ICD implant.  Additionally, given recovered EF, will graduate from CORE clinic. Patient can now be seen as a general cardiology patient.      Recommend patient follow-up with Dr. Craig in 6 months with labs, sooner if needed.  Repeat echocardiogram ~4/2025.    Key Parnell PA-C  [facility]  Pager: [phone]          History of Presenting Illness:    Fanny Myers is a very pleasant 79 year old female with a history of hypertension, hypothyroidism, paroxysmal atrial fibrillation, heart failure with reduced ejection fraction secondary to mixed ischemic and nonischemic cardiomyopathy, and coronary artery disease status post PCI to D1 and distal left circumflex 5/30/2023. There was a plan for staged PCI to an RPL lesion.  However, when patient presented for staged PCI, her dyspnea had resolved.  After discussion, decision was made to hold off on procedure in favor of ongoing medical management and close monitoring.    With attempts at rate controlling her atrial fibrillation, and intervention/ongoing medical management of her coronary artery disease, LVEF did not improve.  ICD implant for primary prevention was recommended, patient declined.  Additionally, initiation of amiodarone with subsequent cardioversion was recommended, patient declined.  Thus, Toprol-XL was uptitrated further in an attempt to optimize her rate control.    At subsequent follow-ups, patient has continued to decline further intervention as she has been feeling well from a cardiac perspective.  Most recent echocardiogram 4/26/2024 revealed improvement in LVEF to 45 to 50%, normal RV size and function, mild MR. At her last visit, 4/2024, no medication changes were made and she was advised to follow-up in 3 months.    Patient is here today for CORE follow-up. Ms. Myers continues to feel well from a cardiac perspective.  Denies chest pain, palpitations, lightheadedness, dizziness, near-syncope or syncope.  No shortness of breath, orthopnea or PND.  No bleeding concerns.    Her only complaint today is of bilateral knee pain secondary to osteoarthritis.    Takes  no medications daily as prescribed.  Blood pressure 108/78 and heart rate 86 in clinic today.  Is no longer weighing herself at home.  Walks with a walker.        Social History       Social History     Socioeconomic History    Marital status:      Spouse name: Not on file    Number of children: Not on file    Years of education: Not on file    Highest education level: Not on file   Occupational History    Not on file   Tobacco Use    Smoking status: Never    Smokeless tobacco: Never   Substance and Sexual Activity    Alcohol use: No    Drug use: No    Sexual activity: Not on file   Other Topics Concern    Not on file   Social History Narrative    Not on file     Social Determinants of Health     Financial Resource Strain: Low Risk  (2/6/2024)    [facility]    Financial Resource Strain     Difficulty of Paying Living Expenses: 3     Difficulty of Paying Living Expenses: Not on file   Food Insecurity: No Food Insecurity (2/6/2024)    Received from [facility]    Food Insecurity     Worried About Running Out of Food in the Last Year: 1   Transportation Needs: No Transportation Needs (2/6/2024)    [facility]    Transportation Needs     Lack of Transportation (Medical): 1   Physical Activity: Not on file   Stress: Not on file   Social Connections: Socially Integrated (2/6/2024)    Received from [facility]    Social Connections     Frequency of Communication with Friends and Family: 0   Interpersonal Safety: Not on file   Housing Stability: Low Risk  (2/6/2024)    Received from [facility]    Housing Stability     Unable to Pay for Housing in the Last Year: 1            Review of Systems:   Please see HPI         Physical Exam:   Vitals: /78 (BP Location: Right arm, Patient Position: Sitting, Cuff Size: Adult  "Regular)   Pulse 86   Ht 1.651 m (5' 5\")   Wt 114.4 kg (252 lb 1.6 oz)   SpO2 98%   BMI 41.95 kg/m     Wt Readings from Last 4 Encounters:   04/30/24 111.8 kg (246 lb 6.4 oz)   01/26/24 111.1 kg (245 lb)   10/25/23 110.5 kg (243 lb 8 oz)   09/26/23 111.4 kg (245 lb 11.2 oz)     GEN: well nourished, in no acute distress.  HEENT:  Pupils equal, round. Sclerae nonicteric.   NECK: Supple, no masses appreciated.  No JVD  C/V: Irregularly irregular rhythm, controlled rate.  No murmur appreciated  RESP: Respirations are unlabored. Clear to auscultation bilaterally without wheezing, rales, or rhonchi.  GI: Abdomen soft, nontender.  EXTREM: No LE edema.  NEURO: Alert and oriented, cooperative.  SKIN: Warm and dry.        Data:   LIPID RESULTS:  Lab Results   Component Value Date    CHOL 135 05/30/2023    HDL 70 05/30/2023    LDL 47 05/30/2023    TRIG 91 05/30/2023     LIVER ENZYME RESULTS:  Lab Results   Component Value Date    AST 33 05/25/2023    ALT 26 05/25/2023     CBC RESULTS:  Lab Results   Component Value Date    WBC 11.0 04/30/2024    WBC 14.2 (H) 04/27/2013    RBC 5.00 04/30/2024    RBC 4.46 04/27/2013    HGB 15.9 (H) 04/30/2024    HGB 14.0 04/27/2013    HCT 49.1 (H) 04/30/2024    HCT 42.1 04/27/2013    MCV 98 04/30/2024    MCV 94 04/27/2013    MCH 31.8 04/30/2024    MCH 31.4 04/27/2013    MCHC 32.4 04/30/2024    MCHC 33.3 04/27/2013    RDW 13.4 04/30/2024    RDW 12.7 04/27/2013     04/30/2024     04/27/2013     BMP RESULTS:  Lab Results   Component Value Date     04/30/2024     04/27/2013    POTASSIUM 4.1 04/30/2024    POTASSIUM 4.5 04/27/2013    CHLORIDE 103 04/30/2024    CHLORIDE 101 04/27/2013    CO2 21 (L) 04/30/2024    CO2 29 04/27/2013    ANIONGAP 14 04/30/2024    ANIONGAP 10.2 04/27/2013    GLC 80 04/30/2024     (H) 04/27/2013    BUN 13.7 04/30/2024    BUN 19 04/27/2013    CR 0.98 (H) 04/30/2024    CR 1.21 (H) 04/27/2013    GFRESTIMATED 58 (L) 04/30/2024    GFRESTIMATED " "44 (L) 04/27/2013    GFRESTBLACK 54 (L) 04/27/2013    DANIE 9.6 04/30/2024    DANIE 9.5 04/27/2013      A1C RESULTS:  No results found for: \"A1C\"  INR RESULTS:  Lab Results   Component Value Date    INR 1.15 06/21/2023            Medications     Current Outpatient Medications   Medication Sig Dispense Refill    apixaban ANTICOAGULANT (ELIQUIS) 5 MG tablet Take 1 tablet (5 mg) by mouth 2 times daily 180 tablet 3    atorvastatin (LIPITOR) 40 MG tablet Take 1 tablet (40 mg) by mouth daily 90 tablet 3    clopidogrel (PLAVIX) 75 MG tablet Take 1 tablet (75 mg) by mouth daily 90 tablet 3    empagliflozin (JARDIANCE) 10 MG TABS tablet Take 1 tablet (10 mg) by mouth daily Pt taking as of 8/22/23, on  cares pt assistance through 12/31/2023 90 tablet 3    famotidine (PEPCID) 20 MG tablet Take 1 tablet (20 mg) by mouth 2 times daily (Patient taking differently: Take 20 mg by mouth 2 times daily as needed)      levothyroxine (SYNTHROID/LEVOTHROID) 112 MCG tablet Take 112 mcg by mouth every morning (before breakfast)      metoprolol succinate ER (TOPROL XL) 50 MG 24 hr tablet Take 3 tablets (150 mg) by mouth daily AND 2 tablets (100 mg) at bedtime. 150 tablet 11    sacubitril-valsartan (ENTRESTO) 24-26 MG per tablet Take 1 tablet by mouth 2 times daily 180 tablet 3          Past Medical History     Past Medical History:   Diagnosis Date    Ischemic cardiomyopathy 01/26/2024    NSTEMI (non-ST elevated myocardial infarction) (H) 06/01/2023    Paroxysmal atrial fibrillation (H) 05/25/2023     Past Surgical History:   Procedure Laterality Date    APPENDECTOMY      CV CORONARY ANGIOGRAM N/A 5/30/2023    Procedure: Coronary Angiogram;  Surgeon: Bryant Simental MD;  Location:  HEART CARDIAC CATH LAB    CV PCI N/A 5/30/2023    Procedure: Percutaneous Coronary Intervention;  Surgeon: Bryant Simental MD;  Location:  HEART CARDIAC CATH LAB    ENT SURGERY      GYN SURGERY       Family History   Problem Relation Age of Onset    Heart " Disease Mother         Per pt something heart related- unsure what    Heart Disease Father         Per pt something heart related- unsure what            Allergies   Digoxin, Codeine sulfate, and Penicillins    The longitudinal plan of care for the diagnosis(es)/condition(s) as documented were addressed during this visit. Due to the added complexity in care, I will continue to support Fanny in the subsequent management and with ongoing continuity of care.     40 minutes spent on the date of the encounter doing chart review, history and exam, documentation and further activities as noted above    Key Parnell PA-C  [facility] - [facility]  Pager: [phone]

## 2024-10-17 DIAGNOSIS — I48.0 PAROXYSMAL ATRIAL FIBRILLATION (H): ICD-10-CM

## 2024-10-17 DIAGNOSIS — I50.21 ACUTE SYSTOLIC HEART FAILURE (H): ICD-10-CM

## 2024-10-17 RX ORDER — METOPROLOL SUCCINATE 50 MG/1
TABLET, EXTENDED RELEASE ORAL
Qty: 450 TABLET | Refills: 3 | Status: SHIPPED | OUTPATIENT
Start: 2024-10-17

## 2024-10-22 ENCOUNTER — PATIENT OUTREACH (OUTPATIENT)
Dept: CARDIOLOGY | Facility: CLINIC | Age: 79
End: 2024-10-22
Payer: COMMERCIAL

## 2024-10-22 NOTE — TELEPHONE ENCOUNTER
"[facility]: Heart Failure Care Coordination   Follow-Up Outreach     Situation/Background:      Chief Complaint   Patient presents with    Clinic Care Coordination - Heart Failure     Pt left vm asking for a call back as she \"has a question\".       Assessment:      Notes:   Call returned to pt. She states she is \"not interested in going through the constant hassle of the mail order prescriptions. I don't want to do all that work. Can I stop these 2 and change to something else?  Pt is referring to [employer] Pt Assistance program.   Discussed that Entresto can be replaced by Losartan as she was on it prior to changing to Entresto. Pt states \"I don't care to be on Jardiance. It is still experimental to me.\"  Pt does have enough medication for the rest of the year.   Both Entresto and Jardiance are cost prohibitive for her.     Intervention/Plan:      Pt will finish up current supply of Entresto and Jardiance. She is discarding re-enrollment forms for [employer] and [employer].     Patient to call/Diglyt message with updates.  Patient to follow up as scheduled.  RN CC reviewed and reinforced fluid/dietary sodium restrictions; patient stated understanding.  Instructed patient to call RN line with new or worsening heart failure symptoms and/or rapid weight gain.  Confirmed patient has clinic and scheduling phone numbers.  RN CC routing to provider for review.    Future Appointments   Date Time Provider Department Center   2/14/2025 10:15 AM EA LAB [facility] EA   2/19/2025  9:45 AM Shawn Craig MD [facility] PSA CLIN       MIRTHA Rhodes, RN 2:30 PM 10/22/24    "

## 2024-10-29 DIAGNOSIS — I48.0 PAROXYSMAL ATRIAL FIBRILLATION (H): ICD-10-CM

## 2024-10-31 NOTE — TELEPHONE ENCOUNTER
"Per Juliane: \"She should continue on her current medications for now given she has enough tablets to make it through the end of the year.  At that time, she can discontinue Jardiance and we will transition her from Entresto to losartan.  She should reach out mid December for this transition.  Thanks, Key \"        Staff message sent to [facility] board for 12/1/24 to review with pt.       Future Appointments   Date Time Provider Department Center   2/14/2025 10:15 AM EA LAB [facility]    2/19/2025  9:45 AM Shawn Craig MD [facility] PSA CLIN         VIN RhodesN, RN 10:40 AM 10/31/24    "

## 2024-12-11 ENCOUNTER — PATIENT OUTREACH (OUTPATIENT)
Dept: CARDIOLOGY | Facility: CLINIC | Age: 79
End: 2024-12-11
Payer: COMMERCIAL

## 2024-12-11 DIAGNOSIS — I50.23 ACUTE ON CHRONIC SYSTOLIC HEART FAILURE (H): Primary | ICD-10-CM

## 2024-12-11 RX ORDER — LOSARTAN POTASSIUM 25 MG/1
12.5 TABLET ORAL DAILY
Qty: 45 TABLET | Refills: 3 | Status: SHIPPED | OUTPATIENT
Start: 2024-12-11 | End: 2024-12-16

## 2024-12-11 NOTE — PROGRESS NOTES
"[facility]: Heart Failure Care Coordination   Follow-Up Outreach     Situation/Background:      Chief Complaint   Patient presents with    Clinic Care Coordination - Follow-up     Entresto and Jardiance stopped, re-initiate losartan     From: Belkys Cao RN   Sent: 12/2/2024  12:00 AM CST   To: Andrae [facility] Heart Core Nurse   Subject: Call pt to discuss transitioning from Entres*     Pt is not wanting to re-enroll for pt assistance for Entresto and Jardiance.     Call pt to discuss transitioning from Entresto back to losartan     Per Key:    \"She should continue on her current medications for now given she has enough tablets to make it through the end of the year.  At that time, she can discontinue Jardiance and we will transition her from Entresto to losartan.  She should reach out mid December for this transition.  Thanks, Key \"       Assessment:      Called Fanny who reports she has run out Jardiance and just ended her Entresto this week.  She cannot re-enroll for pt assistance program for either of them this time around so is interested in starting up again on losartan 12.5 mg daily as she was taking prior to starting Entresto.      She hasn't noticed any change to sx or wt since stopping meds.     Rx sent to [facility] pharmacy off [address] in [place].  Request they reach out to her updating.     Med list updated    Intervention/Plan:      Patient to follow up as scheduled.  RN CC reviewed and reinforced fluid/dietary sodium restrictions; patient stated understanding.  Instructed patient to call RN line with new or worsening heart failure symptoms and/or rapid weight gain.  Confirmed patient has clinic and scheduling phone numbers.  RN CC routing to provider for review.    Future Appointments   Date Time Provider Department Center   2/14/2025 10:15 AM EA LAB [facility] EA   2/19/2025  9:45 AM Shawn Craig MD [facility] PSA CLIN       Nadia Victoria RN BSN   [facility] [place], " DEBBIE KOCH Clinic Care Coordinator  12/11/24, 3:55 PM    [phone]

## 2024-12-16 ENCOUNTER — PATIENT OUTREACH (OUTPATIENT)
Dept: CARDIOLOGY | Facility: CLINIC | Age: 79
End: 2024-12-16
Payer: COMMERCIAL

## 2024-12-16 DIAGNOSIS — I50.23 ACUTE ON CHRONIC SYSTOLIC HEART FAILURE (H): ICD-10-CM

## 2024-12-16 RX ORDER — LOSARTAN POTASSIUM 25 MG/1
25 TABLET ORAL DAILY
Qty: 90 TABLET | Refills: 3 | Status: SHIPPED | OUTPATIENT
Start: 2024-12-16

## 2024-12-16 NOTE — TELEPHONE ENCOUNTER
"[facility]: Heart Failure Care Coordination   Follow-Up Outreach     Situation/Background:      Chief Complaint   Patient presents with    Clinic Care Coordination - Heart Failure     Losartan       Pt left message on [facility] stating \"I have a huge problem with one of my medications. Please call me right away.\"     Pt not wanting to re-enroll into [employer] pt assistance program. She was transitioned back to losartan 12.5 mg once daily.    Assessment:      Notes:   Returned call to pt. Pt states the 25 mg tablets of losartan \"are so small. My arthritic hands can not break them in half. The pharmacist recommended I take one 25 mg tablet every other day. I don't know if that is an option.\" Last dose of Entresto was 12/14/24. She is out of Entresto tablets, \"I have no interest in re-applying for it.\"   She does not have a home BP cuff at home to check, \"I've never had an issue with my blood pressures so I don't see the point of getting one.\"   Call to [facility] to see if they carry 12.5 mg tablets. They do not have 12.5 mg tablets or 25 mg tablets that are big enough to cut in half. Tech confirmed that tablet size \"is very small. That is the only 25 mg tablets we carry.\"           Intervention/Plan:      Update to Key     Future Appointments   Date Time Provider Department Center   2/14/2025 10:15 AM EA LAB [facility] EA   2/19/2025  9:45 AM Shawn Craig MD [facility] PSA CLIN       MIRTHA Rhodes, RN 10:17 AM 12/16/24    "

## 2024-12-16 NOTE — TELEPHONE ENCOUNTER
"[facility]   Recommendations per Key: \"I think she should tolerate losartan 25 mg once daily. She was able to tolerate Entresto 24/26 mg BID.  Just have her watch out for dizziness, etc.\"    Call to pt. She is agreeable to take 25 mg of losartan once daily. She will call [facility] with dizziness/LH or other concerns.    Updated prescription sent to [facility].    Future Appointments   Date Time Provider Department Center   2/14/2025 10:15 AM EA LAB [facility]    2/19/2025  9:45 AM Shawn Craig MD [facility] PSA CLIN         MIRTHA Rhodes, RN 3:02 PM 12/16/24    "

## 2025-02-10 ENCOUNTER — APPOINTMENT (OUTPATIENT)
Dept: GENERAL RADIOLOGY | Facility: CLINIC | Age: 80
End: 2025-02-10
Attending: EMERGENCY MEDICINE
Payer: COMMERCIAL

## 2025-02-10 ENCOUNTER — HOSPITAL ENCOUNTER (OUTPATIENT)
Facility: CLINIC | Age: 80
Setting detail: OBSERVATION
Discharge: HOME | End: 2025-02-11
Attending: EMERGENCY MEDICINE | Admitting: HOSPITALIST
Payer: COMMERCIAL

## 2025-02-10 DIAGNOSIS — R79.89 ELEVATED TROPONIN: ICD-10-CM

## 2025-02-10 DIAGNOSIS — D72.829 LEUKOCYTOSIS, UNSPECIFIED TYPE: ICD-10-CM

## 2025-02-10 DIAGNOSIS — R53.1 GENERALIZED WEAKNESS: ICD-10-CM

## 2025-02-10 LAB
ALBUMIN SERPL BCG-MCNC: 3.8 G/DL (ref 3.5–5.2)
ALBUMIN UR-MCNC: NEGATIVE MG/DL
ALP SERPL-CCNC: 87 U/L (ref 40–150)
ALT SERPL W P-5'-P-CCNC: 15 U/L (ref 0–50)
ANION GAP SERPL CALCULATED.3IONS-SCNC: 11 MMOL/L (ref 7–15)
APPEARANCE UR: CLEAR
AST SERPL W P-5'-P-CCNC: 22 U/L (ref 0–45)
ATRIAL RATE - MUSE: 75 BPM
BACTERIA #/AREA URNS HPF: ABNORMAL /HPF
BASOPHILS # BLD AUTO: 0.1 10E3/UL (ref 0–0.2)
BASOPHILS NFR BLD AUTO: 1 %
BILIRUB DIRECT SERPL-MCNC: <0.2 MG/DL (ref 0–0.3)
BILIRUB SERPL-MCNC: 0.9 MG/DL
BILIRUB UR QL STRIP: NEGATIVE
BUN SERPL-MCNC: 17.1 MG/DL (ref 8–23)
CALCIUM SERPL-MCNC: 9.9 MG/DL (ref 8.8–10.4)
CHLORIDE SERPL-SCNC: 98 MMOL/L (ref 98–107)
COLOR UR AUTO: ABNORMAL
CREAT SERPL-MCNC: 1 MG/DL (ref 0.51–0.95)
DIASTOLIC BLOOD PRESSURE - MUSE: NORMAL MMHG
EGFRCR SERPLBLD CKD-EPI 2021: 57 ML/MIN/1.73M2
EOSINOPHIL # BLD AUTO: 0.2 10E3/UL (ref 0–0.7)
EOSINOPHIL NFR BLD AUTO: 1 %
ERYTHROCYTE [DISTWIDTH] IN BLOOD BY AUTOMATED COUNT: 12.8 % (ref 10–15)
FLUAV RNA SPEC QL NAA+PROBE: NEGATIVE
FLUBV RNA RESP QL NAA+PROBE: NEGATIVE
GLUCOSE SERPL-MCNC: 109 MG/DL (ref 70–99)
GLUCOSE UR STRIP-MCNC: 50 MG/DL
HCO3 SERPL-SCNC: 23 MMOL/L (ref 22–29)
HCT VFR BLD AUTO: 44 % (ref 35–47)
HGB BLD-MCNC: 14.7 G/DL (ref 11.7–15.7)
HGB UR QL STRIP: ABNORMAL
HOLD SPECIMEN: NORMAL
IMM GRANULOCYTES # BLD: 0.1 10E3/UL
IMM GRANULOCYTES NFR BLD: 1 %
INTERPRETATION ECG - MUSE: NORMAL
KETONES UR STRIP-MCNC: NEGATIVE MG/DL
LACTATE SERPL-SCNC: 1.2 MMOL/L (ref 0.7–2)
LEUKOCYTE ESTERASE UR QL STRIP: ABNORMAL
LYMPHOCYTES # BLD AUTO: 1.3 10E3/UL (ref 0.8–5.3)
LYMPHOCYTES NFR BLD AUTO: 11 %
MCH RBC QN AUTO: 32.1 PG (ref 26.5–33)
MCHC RBC AUTO-ENTMCNC: 33.4 G/DL (ref 31.5–36.5)
MCV RBC AUTO: 96 FL (ref 78–100)
MONOCYTES # BLD AUTO: 0.9 10E3/UL (ref 0–1.3)
MONOCYTES NFR BLD AUTO: 8 %
NEUTROPHILS # BLD AUTO: 9.3 10E3/UL (ref 1.6–8.3)
NEUTROPHILS NFR BLD AUTO: 79 %
NITRATE UR QL: NEGATIVE
NRBC # BLD AUTO: 0 10E3/UL
NRBC BLD AUTO-RTO: 0 /100
P AXIS - MUSE: NORMAL DEGREES
PH UR STRIP: 7.5 [PH] (ref 5–7)
PLATELET # BLD AUTO: 300 10E3/UL (ref 150–450)
POTASSIUM SERPL-SCNC: 4.6 MMOL/L (ref 3.4–5.3)
PR INTERVAL - MUSE: NORMAL MS
PROT SERPL-MCNC: 7.2 G/DL (ref 6.4–8.3)
QRS DURATION - MUSE: 84 MS
QT - MUSE: 400 MS
QTC - MUSE: 432 MS
R AXIS - MUSE: 11 DEGREES
RBC # BLD AUTO: 4.58 10E6/UL (ref 3.8–5.2)
RBC URINE: <1 /HPF
RSV RNA SPEC NAA+PROBE: NEGATIVE
SARS-COV-2 RNA RESP QL NAA+PROBE: NEGATIVE
SODIUM SERPL-SCNC: 132 MMOL/L (ref 135–145)
SP GR UR STRIP: 1.01 (ref 1–1.03)
SYSTOLIC BLOOD PRESSURE - MUSE: NORMAL MMHG
T AXIS - MUSE: 74 DEGREES
TROPONIN T SERPL HS-MCNC: 27 NG/L
TROPONIN T SERPL HS-MCNC: 31 NG/L
TROPONIN T SERPL HS-MCNC: 33 NG/L
TSH SERPL DL<=0.005 MIU/L-ACNC: 2.74 UIU/ML (ref 0.3–4.2)
UROBILINOGEN UR STRIP-MCNC: NORMAL MG/DL
VENTRICULAR RATE- MUSE: 70 BPM
WBC # BLD AUTO: 11.8 10E3/UL (ref 4–11)
WBC URINE: 1 /HPF

## 2025-02-10 PROCEDURE — 84484 ASSAY OF TROPONIN QUANT: CPT | Performed by: PHYSICIAN ASSISTANT

## 2025-02-10 PROCEDURE — 84484 ASSAY OF TROPONIN QUANT: CPT | Performed by: EMERGENCY MEDICINE

## 2025-02-10 PROCEDURE — 36415 COLL VENOUS BLD VENIPUNCTURE: CPT | Performed by: PHYSICIAN ASSISTANT

## 2025-02-10 PROCEDURE — 81001 URINALYSIS AUTO W/SCOPE: CPT | Performed by: EMERGENCY MEDICINE

## 2025-02-10 PROCEDURE — 93005 ELECTROCARDIOGRAM TRACING: CPT

## 2025-02-10 PROCEDURE — 99285 EMERGENCY DEPT VISIT HI MDM: CPT | Mod: 25

## 2025-02-10 PROCEDURE — 250N000013 HC RX MED GY IP 250 OP 250 PS 637: Performed by: PHYSICIAN ASSISTANT

## 2025-02-10 PROCEDURE — 87637 SARSCOV2&INF A&B&RSV AMP PRB: CPT | Performed by: EMERGENCY MEDICINE

## 2025-02-10 PROCEDURE — 82947 ASSAY GLUCOSE BLOOD QUANT: CPT | Performed by: EMERGENCY MEDICINE

## 2025-02-10 PROCEDURE — 258N000003 HC RX IP 258 OP 636: Performed by: PHYSICIAN ASSISTANT

## 2025-02-10 PROCEDURE — 82248 BILIRUBIN DIRECT: CPT | Performed by: EMERGENCY MEDICINE

## 2025-02-10 PROCEDURE — 71046 X-RAY EXAM CHEST 2 VIEWS: CPT

## 2025-02-10 PROCEDURE — 36415 COLL VENOUS BLD VENIPUNCTURE: CPT | Performed by: EMERGENCY MEDICINE

## 2025-02-10 PROCEDURE — 82310 ASSAY OF CALCIUM: CPT | Performed by: EMERGENCY MEDICINE

## 2025-02-10 PROCEDURE — 85004 AUTOMATED DIFF WBC COUNT: CPT | Performed by: EMERGENCY MEDICINE

## 2025-02-10 PROCEDURE — 84443 ASSAY THYROID STIM HORMONE: CPT | Performed by: PHYSICIAN ASSISTANT

## 2025-02-10 PROCEDURE — 80053 COMPREHEN METABOLIC PANEL: CPT | Performed by: EMERGENCY MEDICINE

## 2025-02-10 PROCEDURE — 83605 ASSAY OF LACTIC ACID: CPT | Performed by: EMERGENCY MEDICINE

## 2025-02-10 PROCEDURE — G0378 HOSPITAL OBSERVATION PER HR: HCPCS

## 2025-02-10 PROCEDURE — 82374 ASSAY BLOOD CARBON DIOXIDE: CPT | Performed by: EMERGENCY MEDICINE

## 2025-02-10 PROCEDURE — 85041 AUTOMATED RBC COUNT: CPT | Performed by: EMERGENCY MEDICINE

## 2025-02-10 PROCEDURE — 87040 BLOOD CULTURE FOR BACTERIA: CPT | Performed by: EMERGENCY MEDICINE

## 2025-02-10 PROCEDURE — 85014 HEMATOCRIT: CPT | Performed by: EMERGENCY MEDICINE

## 2025-02-10 RX ORDER — AMOXICILLIN 250 MG
2 CAPSULE ORAL 2 TIMES DAILY PRN
Status: DISCONTINUED | OUTPATIENT
Start: 2025-02-10 | End: 2025-02-11 | Stop reason: HOSPADM

## 2025-02-10 RX ORDER — ONDANSETRON 2 MG/ML
4 INJECTION INTRAMUSCULAR; INTRAVENOUS EVERY 6 HOURS PRN
Status: DISCONTINUED | OUTPATIENT
Start: 2025-02-10 | End: 2025-02-11 | Stop reason: HOSPADM

## 2025-02-10 RX ORDER — ONDANSETRON 4 MG/1
4 TABLET, ORALLY DISINTEGRATING ORAL EVERY 6 HOURS PRN
Status: DISCONTINUED | OUTPATIENT
Start: 2025-02-10 | End: 2025-02-11 | Stop reason: HOSPADM

## 2025-02-10 RX ORDER — PROCHLORPERAZINE MALEATE 5 MG/1
5 TABLET ORAL EVERY 6 HOURS PRN
Status: DISCONTINUED | OUTPATIENT
Start: 2025-02-10 | End: 2025-02-11 | Stop reason: HOSPADM

## 2025-02-10 RX ORDER — LOSARTAN POTASSIUM 25 MG/1
25 TABLET ORAL DAILY
Status: DISCONTINUED | OUTPATIENT
Start: 2025-02-10 | End: 2025-02-11 | Stop reason: HOSPADM

## 2025-02-10 RX ORDER — METOPROLOL SUCCINATE 100 MG/1
100 TABLET, EXTENDED RELEASE ORAL AT BEDTIME
Status: DISCONTINUED | OUTPATIENT
Start: 2025-02-10 | End: 2025-02-11 | Stop reason: HOSPADM

## 2025-02-10 RX ORDER — ACETAMINOPHEN 325 MG/1
975 TABLET ORAL 3 TIMES DAILY
Status: DISCONTINUED | OUTPATIENT
Start: 2025-02-10 | End: 2025-02-11 | Stop reason: HOSPADM

## 2025-02-10 RX ORDER — LEVOTHYROXINE SODIUM 112 UG/1
112 TABLET ORAL
Status: DISCONTINUED | OUTPATIENT
Start: 2025-02-11 | End: 2025-02-11 | Stop reason: HOSPADM

## 2025-02-10 RX ORDER — AMOXICILLIN 250 MG
1 CAPSULE ORAL 2 TIMES DAILY
Status: DISCONTINUED | OUTPATIENT
Start: 2025-02-10 | End: 2025-02-11 | Stop reason: HOSPADM

## 2025-02-10 RX ORDER — ATORVASTATIN CALCIUM 40 MG/1
40 TABLET, FILM COATED ORAL DAILY
Status: DISCONTINUED | OUTPATIENT
Start: 2025-02-11 | End: 2025-02-11 | Stop reason: HOSPADM

## 2025-02-10 RX ORDER — AMOXICILLIN 250 MG
1 CAPSULE ORAL 2 TIMES DAILY PRN
Status: DISCONTINUED | OUTPATIENT
Start: 2025-02-10 | End: 2025-02-11 | Stop reason: HOSPADM

## 2025-02-10 RX ORDER — AMOXICILLIN 250 MG
2 CAPSULE ORAL 2 TIMES DAILY
Status: DISCONTINUED | OUTPATIENT
Start: 2025-02-10 | End: 2025-02-11 | Stop reason: HOSPADM

## 2025-02-10 RX ADMIN — APIXABAN 5 MG: 5 TABLET, FILM COATED ORAL at 20:49

## 2025-02-10 RX ADMIN — SODIUM CHLORIDE 500 ML: 9 INJECTION, SOLUTION INTRAVENOUS at 17:34

## 2025-02-10 RX ADMIN — LOSARTAN POTASSIUM 25 MG: 25 TABLET, FILM COATED ORAL at 17:34

## 2025-02-10 RX ADMIN — METOPROLOL SUCCINATE 100 MG: 100 TABLET, EXTENDED RELEASE ORAL at 20:49

## 2025-02-10 RX ADMIN — ACETAMINOPHEN 975 MG: 325 TABLET, FILM COATED ORAL at 20:48

## 2025-02-10 ASSESSMENT — ACTIVITIES OF DAILY LIVING (ADL)
ADLS_ACUITY_SCORE: 61
ADLS_ACUITY_SCORE: 63
ADLS_ACUITY_SCORE: 63
ADLS_ACUITY_SCORE: 61
ADLS_ACUITY_SCORE: 63
ADLS_ACUITY_SCORE: 61
ADLS_ACUITY_SCORE: 63

## 2025-02-10 ASSESSMENT — COLUMBIA-SUICIDE SEVERITY RATING SCALE - C-SSRS
1. IN THE PAST MONTH, HAVE YOU WISHED YOU WERE DEAD OR WISHED YOU COULD GO TO SLEEP AND NOT WAKE UP?: NO
2. HAVE YOU ACTUALLY HAD ANY THOUGHTS OF KILLING YOURSELF IN THE PAST MONTH?: NO
6. HAVE YOU EVER DONE ANYTHING, STARTED TO DO ANYTHING, OR PREPARED TO DO ANYTHING TO END YOUR LIFE?: NO

## 2025-02-10 NOTE — PHARMACY-ADMISSION MEDICATION HISTORY
Pharmacist Admission Medication History    Admission medication history is complete. The information provided in this note is only as accurate as the sources available at the time of the update.    Information Source(s): Patient and CareEverywhere/SureScripts via in-person    Pertinent Information:      Changes made to PTA medication list:  Added: None  Deleted: None  Changed: None    Allergies reviewed with patient and updates made in EHR: yes    Medication History Completed By: Juliana Rosales PharmD 2/10/2025 2:38 PM    PTA Med List   Medication Sig Last Dose/Taking    apixaban ANTICOAGULANT (ELIQUIS) 5 MG tablet Take 1 tablet (5 mg) by mouth 2 times daily. 2/9/2025 Evening    atorvastatin (LIPITOR) 40 MG tablet Take 1 tablet (40 mg) by mouth daily 2/9/2025 Morning    famotidine (PEPCID) 20 MG tablet Take 1 tablet (20 mg) by mouth 2 times daily (Patient taking differently: Take 20 mg by mouth 2 times daily as needed.) Taking Differently    levothyroxine (SYNTHROID/LEVOTHROID) 112 MCG tablet Take 112 mcg by mouth every morning (before breakfast) 2/10/2025 Morning    losartan (COZAAR) 25 MG tablet Take 1 tablet (25 mg) by mouth daily. 2/9/2025    metoprolol succinate ER (TOPROL XL) 50 MG 24 hr tablet Take 3 tablets (150 mg) by mouth daily AND 2 tablets (100 mg) at bedtime. 2/9/2025 Evening

## 2025-02-10 NOTE — ED NOTES
"[facility]  ED Nurse Handoff Report    ED Chief complaint: Generalized Weakness (Lower extremity weakness)  . ED Diagnosis:   Final diagnoses:   Generalized weakness       Allergies:   Allergies   Allergen Reactions    Digoxin Nausea and Vomiting and Diarrhea    Codeine Sulfate Nausea    Penicillins Other (See Comments)     Mother had severe anaphylaxis - was told as child never take PCN       Code Status: Full Code    Activity level - Baseline/Home:  independent.  Activity Level - Current:   A 1-2.   Lift room needed: No.   Bariatric: No   Needed: No   Isolation: No.   Infection: Not Applicable.     Respiratory status: Room air    Vital Signs (within 30 minutes):   Vitals:    02/10/25 1206 02/10/25 1219 02/10/25 1221 02/10/25 1300   BP:   (!) 157/108 132/90   Pulse:   69 72   Resp:   22    Temp:  97.8  F (36.6  C)     SpO2: 95%  99% 99%   Height:   1.651 m (5' 5\")        Cardiac Rhythm:  ,      Pain level:    Patient confused: No.   Patient Falls Risk: arm band in place.   Elimination Status: Has voided     Patient Report - Initial Complaint: Generalized Weakness.   Focused Assessment: Per provider Note:  79 year old female who lives alone in a senior apartment and ambulates with a walker and presents emergency department by EMS after sliding to the ground due to generalized weakness that left her unable to walk.  She had woken up at her normal time today but did not yet had breakfast, she specifically denies any lateralizing weakness.  No trauma.  She specifically denies any new pain including headache, chest pain, abdominal pain, no fevers, no vomiting or diarrhea.  She normally has control of her urine but today she has had several \"gushes\" of urine uncontrolled.  No new back pain.  She states that she had generalized weakness several years ago which was attributed to sepsis, she states she was hospitalized for a few days at [place] but is not sure of any further details.  No " recent antibiotics, no medication changes.     Independent Historian: Paramedics, who reports that her blood sugar was 128.     Review of External Notes: I personally reviewed prior records including August cardiology note at which time her ejection fraction was noted to be 45 to 50%, history of persistent atrial fibrillation, advised to stop Plavix at that time.    Abnormal Results:   Labs Ordered and Resulted from Time of ED Arrival to Time of ED Departure   BASIC METABOLIC PANEL - Abnormal       Result Value    Sodium 132 (*)     Potassium 4.6      Chloride 98      Carbon Dioxide (CO2) 23      Anion Gap 11      Urea Nitrogen 17.1      Creatinine 1.00 (*)     GFR Estimate 57 (*)     Calcium 9.9      Glucose 109 (*)    CBC WITH PLATELETS AND DIFFERENTIAL - Abnormal    WBC Count 11.8 (*)     RBC Count 4.58      Hemoglobin 14.7      Hematocrit 44.0      MCV 96      MCH 32.1      MCHC 33.4      RDW 12.8      Platelet Count 300      % Neutrophils 79      % Lymphocytes 11      % Monocytes 8      % Eosinophils 1      % Basophils 1      % Immature Granulocytes 1      NRBCs per 100 WBC 0      Absolute Neutrophils 9.3 (*)     Absolute Lymphocytes 1.3      Absolute Monocytes 0.9      Absolute Eosinophils 0.2      Absolute Basophils 0.1      Absolute Immature Granulocytes 0.1      Absolute NRBCs 0.0     TROPONIN T, HIGH SENSITIVITY - Abnormal    Troponin T, High Sensitivity 27 (*)    LACTIC ACID WHOLE BLOOD - Normal    Lactic Acid 1.2     HEPATIC FUNCTION PANEL - Normal    Protein Total 7.2      Albumin 3.8      Bilirubin Total 0.9      Alkaline Phosphatase 87      AST 22      ALT 15      Bilirubin Direct <0.20     ROUTINE UA WITH MICROSCOPIC REFLEX TO CULTURE   INFLUENZA A/B, RSV AND SARS-COV2 PCR   TROPONIN T, HIGH SENSITIVITY   BLOOD CULTURE   BLOOD CULTURE        XR Chest 2 Views    (Results Pending)       Treatments provided: See MAR  Family Comments: not present  OBS brochure/video discussed/provided to patient:   Yes  ED Medications: Medications - No data to display    Drips infusing:  No  For the majority of the shift this patient was Green.   Interventions performed were na.    Sepsis treatment initiated: Yes    Per the ED Provider, Time Zero for severe sepsis or septic shock is:  1224    3 Hour Severe Sepsis Bundle Completion:  1. Initial Lactic Acid Result:   Recent Labs   Lab Test 02/10/25  1220   LACT 1.2     2. Blood Cultures before Antibiotics: Yes  3. Broad Spectrum Antibiotics Administered:     Anti-infectives (From now, onward)      None          4. 0 ml of IV fluids have been given so far      6 Hour Severe Sepsis Bundle Completion:    1. Repeat Lactic Acid Level: Not drawn  2. Patient currently on Vasopressors =  No    Cares/treatment/interventions/medications to be completed following ED care: continue to monitor and orders    ED Nurse Name: Deneen MARLOW RN  1:53 PM

## 2025-02-10 NOTE — ED PROVIDER NOTES
"  Emergency Department Note      History of Present Illness     Chief Complaint:  Weakness    HPI   Fanny Myers is a 79 year old female who lives alone in a senior apartment and ambulates with a walker and presents emergency department by EMS after sliding to the ground due to generalized weakness that left her unable to walk.  She had woken up at her normal time today but did not yet had breakfast, she specifically denies any lateralizing weakness.  No trauma.  She specifically denies any new pain including headache, chest pain, abdominal pain, no fevers, no vomiting or diarrhea.  She normally has control of her urine but today she has had several \"gushes\" of urine.  No new back pain.  She states that she had generalized weakness several years ago which was attributed to \"sepsis\", she states she was hospitalized for a few days at [facility] a few years ago for sepsis but is not sure of any further details.  No recent antibiotics, no medication changes.    Independent Historian: Paramedics, who reports that her blood sugar was 128.    Review of External Notes: I personally reviewed prior records including August cardiology note at which time her ejection fraction was noted to be 45 to 50%, history of persistent atrial fibrillation, advised to stop Plavix at that time.    Past Medical History     Medical History and Problem List   Past Medical History:   Diagnosis Date    Ischemic cardiomyopathy 01/26/2024    NSTEMI (non-ST elevated myocardial infarction) (H) 06/01/2023    Paroxysmal atrial fibrillation (H) 05/25/2023     Medications   apixaban ANTICOAGULANT (ELIQUIS) 5 MG tablet  atorvastatin (LIPITOR) 40 MG tablet  famotidine (PEPCID) 20 MG tablet  levothyroxine (SYNTHROID/LEVOTHROID) 112 MCG tablet  losartan (COZAAR) 25 MG tablet  metoprolol succinate ER (TOPROL XL) 50 MG 24 hr tablet      Surgical History   Past Surgical History:   Procedure Laterality Date    APPENDECTOMY      CV CORONARY ANGIOGRAM N/A " "5/30/2023    Procedure: Coronary Angiogram;  Surgeon: Bryant Simental MD;  Location:  HEART CARDIAC CATH LAB    CV PCI N/A 5/30/2023    Procedure: Percutaneous Coronary Intervention;  Surgeon: Bryant Simental MD;  Location:  HEART CARDIAC CATH LAB    ENT SURGERY      GYN SURGERY       Physical Exam     Patient Vitals for the past 24 hrs:   BP Temp Pulse Resp SpO2 Height   02/10/25 1530 (!) 143/96 -- 72 -- 99 % --   02/10/25 1500 (!) 156/105 -- 72 11 97 % --   02/10/25 1450 (!) 146/107 -- 89 -- 98 % --   02/10/25 1430 -- -- 79 -- 99 % --   02/10/25 1300 132/90 -- 72 -- 99 % --   02/10/25 1221 (!) 157/108 -- 69 22 99 % 1.651 m (5' 5\")   02/10/25 1219 -- 97.8  F (36.6  C) -- -- -- --   02/10/25 1206 -- -- -- -- 95 % --   02/10/25 1205 -- -- -- -- 95 % --   02/10/25 1204 -- -- -- -- 96 % --   02/10/25 1203 -- -- -- -- 97 % --   02/10/25 1201 -- -- -- -- 97 % --   02/10/25 1157 -- -- -- -- 95 % --   02/10/25 1156 -- -- -- -- 95 % --   02/10/25 1155 -- -- -- -- 96 % --   02/10/25 1154 -- -- -- -- 96 % --   02/10/25 1152 -- -- -- -- 99 % --   02/10/25 1144 (!) 191/127 -- 80 16 96 % --   02/10/25 1142 (!) 191/127 -- 79 16 97 % --     Physical Exam  General: Chronically but nontoxic-appearing woman recumbent in the gurney in room 14  HENT: mucous membranes moist  CV: rate as above, slightly irregular rhythm, thick legs bilaterally but no pitting edema, no murmur audible  Resp: normal effort, speaks in full phrases, no stridor, no cough observed  GI: abdomen soft and nontender, protuberant, no guarding  MSK: no bony tenderness, no CVAT, spine nontender  Skin: appropriately warm and dry  Neuro: alert, clear speech, oriented, no nuchal rigidity,  equal, can lift both legs off bed, responds briskly appropriate to questions and commands  Psych: cooperative, pleasant    Diagnostics   Electrocardiogram  ECG taken at 1148, ECG interpreted at 1231 by GUY Dupont MD  Atrial fibrillation, nonspecific ST and T waves  Rate " 70 bpm. VA interval n/a. QRS duration 84. QTc 432    Lab Results   Labs Ordered and Resulted from Time of ED Arrival to Time of ED Departure   BASIC METABOLIC PANEL - Abnormal       Result Value    Sodium 132 (*)     Potassium 4.6      Chloride 98      Carbon Dioxide (CO2) 23      Anion Gap 11      Urea Nitrogen 17.1      Creatinine 1.00 (*)     GFR Estimate 57 (*)     Calcium 9.9      Glucose 109 (*)    ROUTINE UA WITH MICROSCOPIC REFLEX TO CULTURE - Abnormal    Color Urine Straw      Appearance Urine Clear      Glucose Urine 50 (*)     Bilirubin Urine Negative      Ketones Urine Negative      Specific Gravity Urine 1.006      Blood Urine Trace (*)     pH Urine 7.5 (*)     Protein Albumin Urine Negative      Urobilinogen Urine Normal      Nitrite Urine Negative      Leukocyte Esterase Urine Trace (*)     Bacteria Urine Few (*)     RBC Urine <1      WBC Urine 1     CBC WITH PLATELETS AND DIFFERENTIAL - Abnormal    WBC Count 11.8 (*)     RBC Count 4.58      Hemoglobin 14.7      Hematocrit 44.0      MCV 96      MCH 32.1      MCHC 33.4      RDW 12.8      Platelet Count 300      % Neutrophils 79      % Lymphocytes 11      % Monocytes 8      % Eosinophils 1      % Basophils 1      % Immature Granulocytes 1      NRBCs per 100 WBC 0      Absolute Neutrophils 9.3 (*)     Absolute Lymphocytes 1.3      Absolute Monocytes 0.9      Absolute Eosinophils 0.2      Absolute Basophils 0.1      Absolute Immature Granulocytes 0.1      Absolute NRBCs 0.0     TROPONIN T, HIGH SENSITIVITY - Abnormal    Troponin T, High Sensitivity 27 (*)    TROPONIN T, HIGH SENSITIVITY - Abnormal    Troponin T, High Sensitivity 31 (*)    LACTIC ACID WHOLE BLOOD - Normal    Lactic Acid 1.2     HEPATIC FUNCTION PANEL - Normal    Protein Total 7.2      Albumin 3.8      Bilirubin Total 0.9      Alkaline Phosphatase 87      AST 22      ALT 15      Bilirubin Direct <0.20     INFLUENZA A/B, RSV AND SARS-COV2 PCR - Normal    Influenza A PCR Negative       Influenza B PCR Negative      RSV PCR Negative      SARS CoV2 PCR Negative     TROPONIN T, HIGH SENSITIVITY   BLOOD CULTURE   BLOOD CULTURE     Imaging   XR Chest 2 Views   Final Result   IMPRESSION:       Cardiac silhouette is normal in size. Mediastinal borders are well-defined. Lung vascularity is normal.      Symmetric lung inflation. No airspace or interstitial opacities. Diaphragmatic curvature is normal. No pleural effusion.      Small diffuse thoracic spine degenerative osteophytes.        Independent Interpretation:   I personally reviewed CXR images, I see no large infiltrate.    ED Course      Medications Administered   Medications - No data to display    ED Course and Discussion of Management   ED Course as of 02/10/25 1620   Mon Feb 10, 2025   1327 I rechecked patient, discussed CXR   1430 I rechecked patient.   1449 I spoke with JUSTA Shahid, Hospitalist, who will come evaluate patient.   1453 I spoke with RN and tech, requested repeat BP with appropriately sized and positioned BP cuff, also road test.   1515 I spoke with techs, patient too weak to walk.  Will pursue Obs admission.     Additional Documentation  Social Determinants of Health: Social Connections/Isolation     Medical Decision Making / Diagnosis   Medical Decision Making:  Patient presents with generalized weakness today, unclear specific cause despite detailed workup as above.  Vital signs are satisfactory, noting that she had initial high blood pressure though her blood pressure cuff was not appropriately positioned or sized.  He has no lateralizing symptoms to suggest an acute neurologic process such as stroke.  Viral swab is negative.  No evidence of major metabolic abnormality, noting mild hyponatremia and borderline kidney function.  Blood sugar is satisfactory and urinalysis is bland.  Antibiotics deferred in the absence of high suspicion of or identification of bacterial infection.  No evidence of sepsis at this time.  However, patient  lives alone, normally can ambulate and at this time is too weak to ambulate and therefore cannot be safely discharged.  This is an acute change.  Therefore, I have arranged for her to be hospitalized for close monitoring and further care.  We attempted a road test here in the emergency department and this was unsuccessful.  Elevated troponin but no chest pain so cardiology consultation deferred, this will be repeated for trending, very low suspicion for an acute coronary syndrome.    Disposition   Admit    Diagnosis     ICD-10-CM    1. Generalized weakness  R53.1       2. Elevated troponin  R79.89       3. Leukocytosis, unspecified type  D72.829     minimal           2/10/2025   MD Cresencio Chatterjee, Christophe Alvarado MD  02/10/25 1621

## 2025-02-10 NOTE — ED TRIAGE NOTES
Pt comes from home. Pt got up at 0800, felt weak in her lower extremities. She slide to the groun, did not fall. Pt stated when weak its usually due to sepsis. Hx of generalized weakness, A fib (on elliquis and plavix).      Triage Assessment (Adult)       Row Name 02/10/25 1144          Triage Assessment    Airway WDL WDL        Respiratory WDL    Respiratory WDL WDL        Skin Circulation/Temperature WDL    Skin Circulation/Temperature WDL WDL        Cardiac WDL    Cardiac WDL WDL        Peripheral/Neurovascular WDL    Peripheral Neurovascular WDL X;neurovascular assessment lower        LLE Neurovascular Assessment    Temperature LLE warm     Color LLE no discoloration     Sensation LLE no numbness;no tingling        RLE Neurovascular Assessment    Temperature RLE warm     Color RLE no discoloration     Sensation RLE no numbness;no tingling

## 2025-02-10 NOTE — ED NOTES
Bed: ED14  Expected date: 2/10/25  Expected time: 11:44 AM  Means of arrival:   Comments:  BIPIN bourgeois

## 2025-02-10 NOTE — ED NOTES
Tried to ambulate pt with walker. I was able to stand pt  with 2 person assist but patient has a very unsteady gate and started falling backwards.

## 2025-02-11 VITALS
WEIGHT: 255.73 LBS | DIASTOLIC BLOOD PRESSURE: 93 MMHG | TEMPERATURE: 97.5 F | SYSTOLIC BLOOD PRESSURE: 144 MMHG | HEART RATE: 80 BPM | HEIGHT: 65 IN | BODY MASS INDEX: 42.61 KG/M2 | RESPIRATION RATE: 18 BRPM | OXYGEN SATURATION: 96 %

## 2025-02-11 LAB
ALBUMIN SERPL BCG-MCNC: 3.8 G/DL (ref 3.5–5.2)
ALP SERPL-CCNC: 78 U/L (ref 40–150)
ALT SERPL W P-5'-P-CCNC: 13 U/L (ref 0–50)
ANION GAP SERPL CALCULATED.3IONS-SCNC: 11 MMOL/L (ref 7–15)
AST SERPL W P-5'-P-CCNC: 19 U/L (ref 0–45)
BASOPHILS # BLD AUTO: 0.1 10E3/UL (ref 0–0.2)
BASOPHILS NFR BLD AUTO: 1 %
BILIRUB DIRECT SERPL-MCNC: 0.21 MG/DL (ref 0–0.3)
BILIRUB SERPL-MCNC: 1.2 MG/DL
BUN SERPL-MCNC: 14.2 MG/DL (ref 8–23)
CALCIUM SERPL-MCNC: 9.7 MG/DL (ref 8.8–10.4)
CHLORIDE SERPL-SCNC: 100 MMOL/L (ref 98–107)
CHOLEST SERPL-MCNC: 116 MG/DL
CREAT SERPL-MCNC: 0.9 MG/DL (ref 0.51–0.95)
EGFRCR SERPLBLD CKD-EPI 2021: 65 ML/MIN/1.73M2
EOSINOPHIL # BLD AUTO: 0.2 10E3/UL (ref 0–0.7)
EOSINOPHIL NFR BLD AUTO: 1 %
ERYTHROCYTE [DISTWIDTH] IN BLOOD BY AUTOMATED COUNT: 13.2 % (ref 10–15)
FASTING STATUS PATIENT QL REPORTED: NORMAL
FLUAV RNA SPEC QL NAA+PROBE: NEGATIVE
FLUBV RNA RESP QL NAA+PROBE: NEGATIVE
GLUCOSE SERPL-MCNC: 104 MG/DL (ref 70–99)
HCO3 SERPL-SCNC: 24 MMOL/L (ref 22–29)
HCT VFR BLD AUTO: 44.4 % (ref 35–47)
HDLC SERPL-MCNC: 63 MG/DL
HGB BLD-MCNC: 14.9 G/DL (ref 11.7–15.7)
IMM GRANULOCYTES # BLD: 0 10E3/UL
IMM GRANULOCYTES NFR BLD: 0 %
LDLC SERPL CALC-MCNC: 29 MG/DL
LYMPHOCYTES # BLD AUTO: 1.6 10E3/UL (ref 0.8–5.3)
LYMPHOCYTES NFR BLD AUTO: 15 %
MCH RBC QN AUTO: 32 PG (ref 26.5–33)
MCHC RBC AUTO-ENTMCNC: 33.6 G/DL (ref 31.5–36.5)
MCV RBC AUTO: 95 FL (ref 78–100)
MONOCYTES # BLD AUTO: 0.9 10E3/UL (ref 0–1.3)
MONOCYTES NFR BLD AUTO: 9 %
NEUTROPHILS # BLD AUTO: 8.1 10E3/UL (ref 1.6–8.3)
NEUTROPHILS NFR BLD AUTO: 74 %
NONHDLC SERPL-MCNC: 53 MG/DL
NRBC # BLD AUTO: 0 10E3/UL
NRBC BLD AUTO-RTO: 0 /100
PLATELET # BLD AUTO: 263 10E3/UL (ref 150–450)
POTASSIUM SERPL-SCNC: 4.2 MMOL/L (ref 3.4–5.3)
PROCALCITONIN SERPL IA-MCNC: 0.08 NG/ML
PROT SERPL-MCNC: 6.7 G/DL (ref 6.4–8.3)
RBC # BLD AUTO: 4.66 10E6/UL (ref 3.8–5.2)
RSV RNA SPEC NAA+PROBE: NEGATIVE
SARS-COV-2 RNA RESP QL NAA+PROBE: NEGATIVE
SODIUM SERPL-SCNC: 135 MMOL/L (ref 135–145)
TRIGL SERPL-MCNC: 121 MG/DL
WBC # BLD AUTO: 10.9 10E3/UL (ref 4–11)

## 2025-02-11 PROCEDURE — 80061 LIPID PANEL: CPT | Performed by: PHYSICIAN ASSISTANT

## 2025-02-11 PROCEDURE — 80053 COMPREHEN METABOLIC PANEL: CPT | Performed by: PHYSICIAN ASSISTANT

## 2025-02-11 PROCEDURE — 250N000011 HC RX IP 250 OP 636: Performed by: PHYSICIAN ASSISTANT

## 2025-02-11 PROCEDURE — 36415 COLL VENOUS BLD VENIPUNCTURE: CPT | Performed by: PHYSICIAN ASSISTANT

## 2025-02-11 PROCEDURE — 84145 PROCALCITONIN (PCT): CPT

## 2025-02-11 PROCEDURE — 87637 SARSCOV2&INF A&B&RSV AMP PRB: CPT

## 2025-02-11 PROCEDURE — 97161 PT EVAL LOW COMPLEX 20 MIN: CPT | Mod: GP

## 2025-02-11 PROCEDURE — 97530 THERAPEUTIC ACTIVITIES: CPT | Mod: GP

## 2025-02-11 PROCEDURE — 82248 BILIRUBIN DIRECT: CPT | Performed by: PHYSICIAN ASSISTANT

## 2025-02-11 PROCEDURE — G0378 HOSPITAL OBSERVATION PER HR: HCPCS

## 2025-02-11 PROCEDURE — 85004 AUTOMATED DIFF WBC COUNT: CPT

## 2025-02-11 PROCEDURE — 36415 COLL VENOUS BLD VENIPUNCTURE: CPT

## 2025-02-11 PROCEDURE — 96374 THER/PROPH/DIAG INJ IV PUSH: CPT

## 2025-02-11 PROCEDURE — 99238 HOSP IP/OBS DSCHRG MGMT 30/<: CPT

## 2025-02-11 PROCEDURE — 250N000013 HC RX MED GY IP 250 OP 250 PS 637: Performed by: PHYSICIAN ASSISTANT

## 2025-02-11 PROCEDURE — 97116 GAIT TRAINING THERAPY: CPT | Mod: GP

## 2025-02-11 RX ORDER — GUAIFENESIN AND DEXTROMETHORPHAN HYDROBROMIDE 600; 30 MG/1; MG/1
1 TABLET, EXTENDED RELEASE ORAL 2 TIMES DAILY PRN
Status: DISCONTINUED | OUTPATIENT
Start: 2025-02-11 | End: 2025-02-11 | Stop reason: HOSPADM

## 2025-02-11 RX ORDER — BENZONATATE 100 MG/1
100 CAPSULE ORAL 3 TIMES DAILY PRN
Status: DISCONTINUED | OUTPATIENT
Start: 2025-02-11 | End: 2025-02-11 | Stop reason: HOSPADM

## 2025-02-11 RX ADMIN — LOSARTAN POTASSIUM 25 MG: 25 TABLET, FILM COATED ORAL at 09:45

## 2025-02-11 RX ADMIN — APIXABAN 5 MG: 5 TABLET, FILM COATED ORAL at 09:46

## 2025-02-11 RX ADMIN — ATORVASTATIN CALCIUM 40 MG: 40 TABLET, FILM COATED ORAL at 09:45

## 2025-02-11 RX ADMIN — LEVOTHYROXINE SODIUM 112 MCG: 0.11 TABLET ORAL at 06:30

## 2025-02-11 RX ADMIN — ONDANSETRON 4 MG: 2 INJECTION INTRAMUSCULAR; INTRAVENOUS at 08:22

## 2025-02-11 RX ADMIN — ACETAMINOPHEN 975 MG: 325 TABLET, FILM COATED ORAL at 09:45

## 2025-02-11 RX ADMIN — ACETAMINOPHEN 975 MG: 325 TABLET, FILM COATED ORAL at 14:36

## 2025-02-11 RX ADMIN — METOPROLOL SUCCINATE 150 MG: 100 TABLET, EXTENDED RELEASE ORAL at 09:46

## 2025-02-11 ASSESSMENT — ACTIVITIES OF DAILY LIVING (ADL)
ADLS_ACUITY_SCORE: 49
ADLS_ACUITY_SCORE: 53
ADLS_ACUITY_SCORE: 49

## 2025-02-11 NOTE — PLAN OF CARE
PRIMARY DIAGNOSIS: GENERALIZED WEAKNESS    OUTPATIENT/OBSERVATION GOALS TO BE MET BEFORE DISCHARGE  1. Orthostatic performed: No    2. Tolerating PO medications: Yes    3. Return to near baseline physical activity: No    4. Cleared for discharge by consultants (if involved): No    Discharge Planner Nurse   Safe discharge environment identified:  pending  Barriers to discharge: Yes       Entered by: Nasreen Jerez RN 02/11/2025 4:00 AM     Please review provider order for any additional goals.   Nurse to notify provider when observation goals have been met and patient is ready for discharge.    Goal Outcome Evaluation:      Plan of Care Reviewed With: patient    Overall Patient Progress: improvingOverall Patient Progress: improving    Outcome Evaluation: Pt A/ox4. Ax1-2 w/ GB/W. Ref OOB during shift. Purewick in place. Uses bedpan for bowels. Low sat fat Na diet. Denied pain, N/V, SOB.      Problem: Adult Inpatient Plan of Care  Goal: Plan of Care Review  Description: The Plan of Care Review/Shift note should be completed every shift.  The Outcome Evaluation is a brief statement about your assessment that the patient is improving, declining, or no change.  This information will be displayed automatically on your shift  note.  2/11/2025 0400 by Nasreen Jerez RN  Outcome: Progressing  Flowsheets (Taken 2/11/2025 0400)  Outcome Evaluation: Pt A/ox4. Ax1-2 w/ GB/W. Ref OOB during shift. Purewick in place. Uses bedpan for bowels. Low sat fat Na diet. Denied pain, N/V, SOB.  Plan of Care Reviewed With: patient  Overall Patient Progress: improving  2/11/2025 0150 by Nasreen Jerez RN  Outcome: Progressing  Flowsheets (Taken 2/11/2025 0150)  Outcome Evaluation: Pt A/ox4. Ax1-2 w/ GB/W. Ref OOB during shift. Purewick in place. Uses bedpan for bowels. Low sat fat Na diet.  Plan of Care Reviewed With: patient  Overall Patient Progress: no change  Goal: Patient-Specific Goal (Individualized)  Description: You can add care  "plan individualizations to a care plan. Examples of Individualization might be:  \"Parent requests to be called daily at 9am for status\", \"I have a hard time hearing out of my right ear\", or \"Do not touch me to wake me up as it startles  me\".  2/11/2025 0400 by Nasreen Jerez RN  Outcome: Progressing  2/11/2025 0150 by Nasreen Jerez RN  Outcome: Progressing  Goal: Absence of Hospital-Acquired Illness or Injury  2/11/2025 0400 by Nasreen Jerez RN  Outcome: Progressing  2/11/2025 0150 by Nasreen Jerez RN  Outcome: Progressing  Intervention: Identify and Manage Fall Risk  Recent Flowsheet Documentation  Taken 2/10/2025 2330 by Nasreen Jerez RN  Safety Promotion/Fall Prevention:   safety round/check completed   room near nurse's station   room door open  Intervention: Prevent Skin Injury  Recent Flowsheet Documentation  Taken 2/10/2025 2330 by Nasreen Jerez RN  Body Position: position changed independently  Intervention: Prevent Infection  Recent Flowsheet Documentation  Taken 2/10/2025 2330 by Nasreen Jerez RN  Infection Prevention: rest/sleep promoted  Goal: Optimal Comfort and Wellbeing  2/11/2025 0400 by Nasreen Jerez RN  Outcome: Progressing  2/11/2025 0150 by Nasreen Jerez RN  Outcome: Progressing  Intervention: Monitor Pain and Promote Comfort  Recent Flowsheet Documentation  Taken 2/10/2025 2330 by Nasreen Jerez RN  Pain Management Interventions:   pillow support provided   quiet environment facilitated  Goal: Readiness for Transition of Care  2/11/2025 0400 by Nasreen Jerez RN  Outcome: Progressing  2/11/2025 0150 by Nasreen Jerez RN  Outcome: Progressing     "

## 2025-02-11 NOTE — PLAN OF CARE
"Physical Therapy Discharge Summary    Reason for therapy discharge:    All goals and outcomes met, no further needs identified.    Progress towards therapy goal(s). See goals on Care Plan in [facility] electronic health record for goal details.  Goals met    Therapy recommendation(s):    Continue home exercise program.  Per most recent PT note: \" Pt at baseline is IND with functional mobility, 4WW for ambulation. Lives in apartment alone. Currently, pt is near baseline, mild deficits in strength and activity tolerance observed (some of which were likely present at baseline per pt report). Recommending HEP for ambulation, educated pt on 3x/day short bouts of gait, 4-5 minutes, progressing time after ~1 week for increasing endurance. Pt verbalized understanding. Rec using 4WW still. \"       "

## 2025-02-11 NOTE — CARE PLAN
ROOM # 208-1    Living Situation (if not independent, order SW consult):Home alone  Facility name:  : Daughter    Activity level at baseline: IND   Activity level on admit: A x 1 in bed     Who will be transporting you at discharge:     Patient registered to observation; given Patient Bill of Rights; given the opportunity to ask questions about observation status and their plan of care.  Patient has been oriented to the observation room, bathroom and call light is in place.    Discussed discharge goals and expectations with patient/family.

## 2025-02-11 NOTE — PLAN OF CARE
"PRIMARY DIAGNOSIS: GENERALIZED WEAKNESS    OUTPATIENT/OBSERVATION GOALS TO BE MET BEFORE DISCHARGE  1. Orthostatic performed: N/A    2. Tolerating PO medications: Yes    3. Return to near baseline physical activity: No    4. Cleared for discharge by consultants (if involved): No    Discharge Planner Nurse   Safe discharge environment identified:  pending  Barriers to discharge: Yes       Entered by: Nasreen Jerez RN 02/11/2025 1:53 AM     Please review provider order for any additional goals.   Nurse to notify provider when observation goals have been met and patient is ready for discharge.    Goal Outcome Evaluation:      Plan of Care Reviewed With: patient    Overall Patient Progress: no changeOverall Patient Progress: no change    Outcome Evaluation: Pt A/ox4. Ax1-2 w/ GB/W. Ref OOB during shift. Purewick in place. Uses bedpan for bowels. Low sat fat Na diet.      Problem: Adult Inpatient Plan of Care  Goal: Plan of Care Review  Description: The Plan of Care Review/Shift note should be completed every shift.  The Outcome Evaluation is a brief statement about your assessment that the patient is improving, declining, or no change.  This information will be displayed automatically on your shift  note.  Outcome: Progressing  Flowsheets (Taken 2/11/2025 0150)  Outcome Evaluation: Pt A/ox4. Ax1-2 w/ GB/W. Ref OOB during shift. Purewick in place. Uses bedpan for bowels. Low sat fat Na diet.  Plan of Care Reviewed With: patient  Overall Patient Progress: no change  Goal: Patient-Specific Goal (Individualized)  Description: You can add care plan individualizations to a care plan. Examples of Individualization might be:  \"Parent requests to be called daily at 9am for status\", \"I have a hard time hearing out of my right ear\", or \"Do not touch me to wake me up as it startles  me\".  Outcome: Progressing  Goal: Absence of Hospital-Acquired Illness or Injury  Outcome: Progressing  Intervention: Identify and Manage Fall " Risk  Recent Flowsheet Documentation  Taken 2/10/2025 2330 by Nasreen Jerez RN  Safety Promotion/Fall Prevention:   safety round/check completed   room near nurse's station   room door open  Intervention: Prevent Skin Injury  Recent Flowsheet Documentation  Taken 2/10/2025 2330 by Nasreen Jerez RN  Body Position: position changed independently  Intervention: Prevent Infection  Recent Flowsheet Documentation  Taken 2/10/2025 2330 by Nasreen Jerez RN  Infection Prevention: rest/sleep promoted  Goal: Optimal Comfort and Wellbeing  Outcome: Progressing  Intervention: Monitor Pain and Promote Comfort  Recent Flowsheet Documentation  Taken 2/10/2025 2330 by Nasreen Jerez RN  Pain Management Interventions:   pillow support provided   quiet environment facilitated  Goal: Readiness for Transition of Care  Outcome: Progressing

## 2025-02-11 NOTE — CONSULTS
Care Management Discharge Note    Discharge Date: 02/11/2025     Discharge Disposition:  Home     Additional Information:  CM consulted for discharge planning, per chart review pt lives alone in a senior apartment and is independent with WW at baseline. PT evaluated and recommends discharge to home with ongoing WW use. Provider has placed discharge orders, no further needs anticipated. Consult cleared, will sign off. Please call if needs arise.     Bibi Haney RN BSN   Inpatient Care Coordination  [facility]   Phone [phone]

## 2025-02-11 NOTE — DISCHARGE SUMMARY
"[facility]  Hospitalist Discharge Summary      Date of Admission:  2/10/2025  Date of Discharge:  2/11/2025  Discharging Provider: RICKY Bronson PA-C  Discharge Service: Hospitalist Service    Discharge Diagnoses   Generalized weakness   Slid to floor due to above     Clinically Significant Risk Factors     # Severe Obesity: Estimated body mass index is 42.56 kg/m  as calculated from the following:    Height as of this encounter: 1.651 m (5' 5\").    Weight as of this encounter: 116 kg (255 lb 11.7 oz).       Follow-ups Needed After Discharge   Follow-up Appointments       Follow-up and recommended labs and tests       Follow up with primary care provider, Royer Silver, within 7 days for hospital follow- up.  No follow up labs or test are needed.              Unresulted Labs Ordered in the Past 30 Days of this Admission       Date and Time Order Name Status Description    2/10/2025  1:15 PM Blood Culture Wrist, Right Preliminary     2/10/2025 12:24 PM Blood Culture Peripheral Blood Preliminary         These results will be followed up by hospitalist     Discharge Disposition   Discharged to home  Condition at discharge: Stable    Hospital Course   Fanny Myers is a 79 year old female admitted on 2/10/2025. 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   She was sitting at the edge of the bed this morning, but as she was standing up, she couldn't hold her weight and slid down to the floor. Did not hit anything on the way down as it was a slow glide. No LOC. Couldn't get herself up so she called EMS.      Work up here in the ED has been fairly unremarkable except for mild hyponatremia and mild Cr elevation to 1.0 from baseline of 0.95. Trops detectable but no c/o chest pain or discomfort. EKG shows afib rate " controlled. CXR, blood cx, UA and viral panel all negative. We attempted road test but she was unsteady and needed assist of 2 so she will be coming in for therapies.     Assumed patient care today.  Patient is feeling much better.  States that she slid out of bed due to weakness in her lower legs and was unable to get back up. Was able to ambulate well with PT.  Feels safe to discharge home.  Denies any chest pain, shortness of breath, fevers, chills, abdominal pain, nausea, vomiting, diarrhea. Strength in her legs has improved. No numbness or tingling in the lower legs. Educated patient on return precautions and answered all questions prior to discharge.      # Generalized weakness   # Slid to floor due to above   - No focal deficits but rather felt weak in the legs and unable to bear weight as she was getting OOB  - No indication of TIA or CVA, no brain images needed   - O/w no other infectious or metabolic causes of weakness   - PT eval: home     #HFrEF  #ICM  #CAD   #HLP  #Detectable troponin   - Doing well from cardiac standpoint. Appears to be euvolemic, no e/o acute exacerbation   - Trops detectable but flat, no c/o chest pain, EKG non ischemic   - No need for further cardiac testing, last ECHO 4/24 shows improved EF of 45-50% from 35%  - Resume statin   - Meeting with Dr. [facility] next week, requesting labs to be drawn so that she doesn't need to make another trip, will add BMP, Hepatic and Lipid panel for tomorrow      #Afib   - Rate controlled   - Cont Toprol Xl and Eliquis      #HTN  - BP elevated on initial presentation but likely due to wrong cuff size  - Changed cuff with improved BPs   - Will also resume PTA meds including Toprol XL, losartan      #Hypothyroidism  - Resume Synthroid  - Check TSH     Consultations This Hospital Stay   CARE MANAGEMENT / SOCIAL WORK IP CONSULT  PHYSICAL THERAPY ADULT IP CONSULT    Code Status   No CPR- Do NOT Intubate    Time Spent on this Encounter   RICKY TRIPLETT  MARY Bronson, personally saw the patient today and spent less than or equal to 30 minutes discharging this patient.       RICKY Bronson PA-C  [facility] OBSERVATION DEPT  [address]  [facility] [address]  Phone: [phone]  ______________________________________________________________________    Physical Exam   Vital Signs: Temp: 97.5  F (36.4  C) Temp src: Oral BP: (!) 149/94 Pulse: 79   Resp: 18 SpO2: 98 % O2 Device: None (Room air)    Weight: 255 lbs 11.74 oz    GENERAL:  Alert, Comfortable, No acute distress. Sitting up in bed  PSYCH: pleasant, oriented.  HEENT:  Normocephalic, No scleral icterus or conjunctival injection, normal hearing  HEART:  Normal S1, S2 with no murmur, RRR  LUNGS:  Normal Respiratory effort. Clear to auscultation bilaterally with no wheezing, rales or ronchi.  ABDOMEN:  Soft, non-tender, non distended. No peritoneal signs.   EXTREMITIES:  No pitting pedal edema, No cyanosis.   SKIN:  Warm, dry to touch. No rash.  NEUROLOGIC:  CN 2-12 intact, Finger to nose normal. BL 5/5 symmetric upper and lower extremity strength, sensation intact with no focal deficits. Speech clear, alert & orientated x 4.    Primary Care Physician   Royer Silver    Discharge Orders      Reason for your hospital stay    Generalized weakness, slid to floor     Follow-up and recommended labs and tests     Follow up with primary care provider, Royer Silver, within 7 days for hospital follow- up.  No follow up labs or test are needed.     Activity    Your activity upon discharge: activity as tolerated     Diet    Follow this diet upon discharge: Regular     Significant Results and Procedures   Results for orders placed or performed during the hospital encounter of 02/10/25   XR Chest 2 Views    Narrative    EXAM: XR CHEST 2 VIEWS  LOCATION: [facility]  DATE: 2/10/2025    INDICATION: weakness, no fever cough  COMPARISON: AP and lateral views the chest 5/25/2023       Impression    IMPRESSION:     Cardiac silhouette is normal in size. Mediastinal borders are well-defined. Lung vascularity is normal.    Symmetric lung inflation. No airspace or interstitial opacities. Diaphragmatic curvature is normal. No pleural effusion.    Small diffuse thoracic spine degenerative osteophytes.     Discharge Medications   Current Discharge Medication List        CONTINUE these medications which have NOT CHANGED    Details   apixaban ANTICOAGULANT (ELIQUIS) 5 MG tablet Take 1 tablet (5 mg) by mouth 2 times daily.  Qty: 180 tablet, Refills: 3    Associated Diagnoses: Paroxysmal atrial fibrillation (H)      atorvastatin (LIPITOR) 40 MG tablet Take 1 tablet (40 mg) by mouth daily  Qty: 90 tablet, Refills: 3    Associated Diagnoses: Elevated troponin      famotidine (PEPCID) 20 MG tablet Take 1 tablet (20 mg) by mouth 2 times daily    Associated Diagnoses: Dyspepsia      levothyroxine (SYNTHROID/LEVOTHROID) 112 MCG tablet Take 112 mcg by mouth every morning (before breakfast)      losartan (COZAAR) 25 MG tablet Take 1 tablet (25 mg) by mouth daily.  Qty: 90 tablet, Refills: 3    Associated Diagnoses: Acute on chronic systolic heart failure (H)      metoprolol succinate ER (TOPROL XL) 50 MG 24 hr tablet Take 3 tablets (150 mg) by mouth daily AND 2 tablets (100 mg) at bedtime.  Qty: 450 tablet, Refills: 3    Associated Diagnoses: Acute systolic heart failure (H); Paroxysmal atrial fibrillation (H)           Allergies   Allergies   Allergen Reactions    Digoxin Nausea and Vomiting and Diarrhea    Codeine Sulfate Nausea    Penicillins Other (See Comments)     Mother had severe anaphylaxis - was told as child never take PCN

## 2025-02-11 NOTE — CARE PLAN
PRIMARY DIAGNOSIS: G weakness  OUTPATIENT/OBSERVATION GOALS TO BE MET BEFORE DISCHARGE:  ADLs back to baseline: Yes    Activity and level of assistance: A x 1 in bed     Pain status: Pain free.    Return to near baseline physical activity: No     Discharge Planner Nurse   Safe discharge environment identified: No  Barriers to discharge: Yes       Entered by: Stephen Mcmillan RN 02/11/2025      Please review provider order for any additional goals.   Nurse to notify provider when observation goals have been met and patient is ready for discharge.    Alert and oriented x4,VSS on RA,denies pain,regular diet tolerated well,Pure wick in place

## 2025-02-11 NOTE — PROGRESS NOTES
"   02/11/25 1429   Appointment Info   Signing Clinician's Name / Credentials (PT) Faisal Lea DPT   Quick Adds   Quick Adds Certification   Living Environment   People in Home alone   Current Living Arrangements apartment   Home Accessibility no concerns   Living Environment Comments Pt lives in [address]+ apartment, has assistance with groceries as needed.   Self-Care   Usual Activity Tolerance moderate   Current Activity Tolerance moderate   Equipment Currently Used at Home walker, rolling   Fall history within last six months yes   Number of times patient has fallen within last six months 1   Activity/Exercise/Self-Care Comment IND with functional mobility, uses FWW.   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   She was sitting at the edge of the bed this morning, but as she was standing up, she couldn't hold her weight and slid down to the floor. Did not hit anything on the way down as it was a slow glide. No LOC. Couldn't get herself up so she called EMS.      Work up here in the ED has been fairly unremarkable except for mild hyponatremia and mild Cr elevation to 1.0 from baseline of 0.95. Trops detectable but no c/o chest pain or discomfort. EKG shows afib rate controlled. CXR, blood cx, UA and viral panel all negative. We attempted road test but she was unsteady and needed assist of 2 so she will be coming in for therapies. \"   Existing Precautions/Restrictions fall   Cognition   Affect/Mental Status (Cognition) WFL   Orientation Status (Cognition) oriented x 4   Follows " Commands (Cognition) WFL   Pain Assessment   Patient Currently in Pain No   Integumentary/Edema   Integumentary/Edema Comments 1+ pitting in B dorsum feet, no pitting in ankles or lower legs but mild edema noted.   Posture    Posture Forward head position   Range of Motion (ROM)   Range of Motion ROM is WFL   Strength (Manual Muscle Testing)   Strength (Manual Muscle Testing) Able to perform R SLR;Able to perform L SLR;Deficits observed during functional mobility   Bed Mobility   Comment, (Bed Mobility) supine > sit with SBA, from elevated HOB, using bed rail.   Transfers   Comment, (Transfers) STS with FWW and CGA, pushing up from walker, slightly elevated bed height.   Gait/Stairs (Locomotion)   Comment, (Gait/Stairs) 10' with FWW and CGA, WBOS, slow gait speed, overall steady with use of walker.   Balance   Balance Comments walker and SBA, steady with use of AD.   Sensory Examination   Sensory Perception Comments tingling to plantar aspect of B feet, light touch intact per screen to distal B LEs.   Clinical Impression   Criteria for Skilled Therapeutic Intervention Yes, treatment indicated   PT Diagnosis (PT) impaired functional mobility   Influenced by the following impairments decreased strength and activity tolerance   Functional limitations due to impairments difficulty with transfers and ambulation   Clinical Presentation (PT Evaluation Complexity) stable   Clinical Presentation Rationale clinical judgment   Clinical Decision Making (Complexity) low complexity   Planned Therapy Interventions (PT) balance training;bed mobility training;gait training;patient/family education;strengthening;stretching;transfer training;progressive activity/exercise   Risk & Benefits of therapy have been explained evaluation/treatment results reviewed;care plan/treatment goals reviewed;risks/benefits reviewed;current/potential barriers reviewed;participants voiced agreement with care plan;participants included;patient   PT Total  Evaluation Time   PT Eval, Low Complexity Minutes (38785) 10   Therapy Certification   Start of care date 02/10/25   Certification date from 02/11/25   Certification date to 02/18/25   Medical Diagnosis fall, generalized weakness   Physical Therapy Goals   PT Frequency One time eval and treatment only   PT Predicted Duration/Target Date for Goal Attainment 02/18/25   PT Goals Bed Mobility;Transfers;Gait   PT: Bed Mobility Independent;Supine to/from sit   PT: Transfers Supervision/stand-by assist;Sit to/from stand;Assistive device   PT: Gait Supervision/stand-by assist;Rolling walker;100 feet   Interventions   Interventions Quick Adds Gait Training;Therapeutic Activity   Therapeutic Activity   Therapeutic Activities: dynamic activities to improve functional performance Minutes (48683) 16   Symptoms Noted During/After Treatment Fatigue   Treatment Detail/Skilled Intervention Pt supine in bed upon PT arrival. Agreeable to session. Pt needing some redirection during session. Pt performed STS x3 during session, FWW and CGA, progressed to SBA, cues on UE placement. Pt performed sit > supine SBA. Pt cued for repositioning in bed using bed rails to get closer to HOB. Performed SBA. Pt educated on progressing endurance and overall mobility, ambulation program HEP, 3x/day for 4-5 minutes ambulating, keep time for ~1 week, then attempt to increase by 1 minute, repeat over time with 1 week increments. Pt verbalized understanding. Education on benefits of aerobic exercise with goals of continuous activity for 10+ minutes. Pt supine, alarm on, all needs within reach.   Gait Training   Gait Training Minutes (57847) 8   Symptoms Noted During/After Treatment (Gait Training) fatigue   Treatment Detail/Skilled Intervention Pt ambulated ~100' with FWW and CGA, progressed to SBA. Pt cued for walker proximity and to remain within confines of walker during turns and pivots, pt demonstrated understanding with some rapid movement of walker  during turn but steady. Pt educated on continued use of walker at home (has 4WW).   PT Discharge Planning   PT Plan d/c from PT   PT Discharge Recommendation (DC Rec) home with assist   PT Rationale for DC Rec Pt at baseline is IND with functional mobility, 4WW for ambulation. Lives in apartment alone. Currently, pt is near baseline, mild deficits in strength and activity tolerance observed (some of which were likely present at baseline per pt report). Recommending HEP for ambulation, educated pt on 3x/day short bouts of gait, 4-5 minutes, progressing time after ~1 week for increasing endurance. Pt verbalized understanding. Rec using 4WW still.   PT Brief overview of current status SBA with FWW.   PT Total Distance Amb During Session (feet) 110   Physical Therapy Time and Intention   Timed Code Treatment Minutes 24   Total Session Time (sum of timed and untimed services) 34     [facility]                                                                                   OUTPATIENT PHYSICAL THERAPY    PLAN OF TREATMENT FOR OUTPATIENT REHABILITATION   Patient's Last Name, First Name, M.I.  LouisFanny owusu YOB: 1945   Provider's Name   [facility]   Medical Record No.  [id number]     Onset Date: 02/10/25 Start of Care Date: 02/10/25     Medical Diagnosis:  fall, generalized weakness               PT Diagnosis:  impaired functional mobility Certification Dates:  From: 02/11/25  To: 02/18/25       See note for plan of treatment, functional goals, and certification details.    I CERTIFY THE NEED FOR THESE SERVICES FURNISHED UNDER        THIS PLAN OF TREATMENT AND WHILE UNDER MY CARE (Physician co-signature of this document indicates review and certification of the therapy plan).

## 2025-02-12 ENCOUNTER — PATIENT OUTREACH (OUTPATIENT)
Dept: CARDIOLOGY | Facility: CLINIC | Age: 80
End: 2025-02-12
Payer: COMMERCIAL

## 2025-02-12 NOTE — PROGRESS NOTES
[facility]: Heart Failure Care Coordination   Documentation    Situation/Background:      Chief Complaint   Patient presents with    Clinic Care Coordination - Follow-up     Questions about appt on 2/14/25     Received VM on CORE RN line from Minerva who is requesting call back as she has some questions about her appt on Fri 2/14/25.      Assessment:      Called Fanny back who reports she was actually admitted to hospital this week after slipping out of her bed and not being strong enough to get up.  They renita labs while in hospital, so she is wondering if she can cancel Fri's lab appt.    Reviewed chart and hospital did draw all labs needed for Fri (BMP, ALT, FLP).  Informed her ok to cancel Fri's lab appt.     Intervention/Plan:      Cancelled 2/14's lab appt.  Pt will come to 2/19 OV w/ Dr. Craig as scheduled.  She voices understanding and denies further questions or concerns at this time.     Future Appointments   Date Time Provider Department Center   2/19/2025  9:45 AM Shawn Craig MD [facility]         Nadia Victoria, RN BSN   [facility]- [place], [place]  C.O.R.E. Clinic Care Coordinator  02/12/25, 8:27 AM    [phone]

## 2025-02-13 LAB
BACTERIA BLD CULT: NORMAL
BACTERIA BLD CULT: NORMAL

## 2025-02-15 LAB
BACTERIA BLD CULT: NO GROWTH
BACTERIA BLD CULT: NO GROWTH

## 2025-02-19 ENCOUNTER — OFFICE VISIT (OUTPATIENT)
Dept: CARDIOLOGY | Facility: CLINIC | Age: 80
End: 2025-02-19
Attending: INTERNAL MEDICINE
Payer: COMMERCIAL

## 2025-02-19 VITALS
SYSTOLIC BLOOD PRESSURE: 145 MMHG | WEIGHT: 263.4 LBS | DIASTOLIC BLOOD PRESSURE: 107 MMHG | HEART RATE: 82 BPM | BODY MASS INDEX: 43.89 KG/M2 | HEIGHT: 65 IN

## 2025-02-19 DIAGNOSIS — I50.23 ACUTE ON CHRONIC SYSTOLIC HEART FAILURE (H): ICD-10-CM

## 2025-02-19 RX ORDER — ACETAMINOPHEN 325 MG/1
325-650 TABLET ORAL EVERY 6 HOURS PRN
COMMUNITY

## 2025-02-19 RX ORDER — TORSEMIDE 10 MG/1
10 TABLET ORAL DAILY
Qty: 90 TABLET | Refills: 3 | Status: SHIPPED | OUTPATIENT
Start: 2025-02-19

## 2025-02-19 NOTE — PATIENT INSTRUCTIONS
February 19, 2025    Thank you for allowing our Cardiology team to participate in your care.     Please note the following changes to your heart treatment plan:     Medication changes:   - start torsemide 10 mg daily     Tests to be done:  - labs in 1 week     Follow up:  - Follow up in 3 months, or sooner as needed      For scheduling, please call [phone]      Please contact our team through [employer] or our Nurse Team Voicemail service [phone] for questions or concerns.     General Clinic [phone]     If you are having a medical emergency, please call 911.     Sincerely,    Shawn Craig MD, FACC  Cardiology    [facility] [place]

## 2025-02-19 NOTE — LETTER
2/19/2025    Royer Silver MD  [address]  [place] [address]    RE: Fanny Myers       Dear Colleague,     I had the pleasure of seeing Fanny Myers in the [facility].      Cardiology Clinic Progress Note:    February 20, 2025   Patient Name: Fanny Myers  Patient MRN: [id number]     Consult indication: CHF    HPI:    I had the opportunity to see patient Fanny Myers in cardiology clinic for a follow up visit. Patient is followed by our colleague Royer Silver MD with Primary Care.     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     Patient has a history of heart failure with reduced ejection fraction, LVEF 30 to 35%, attributed to mixed ischemic and nonischemic cardiomyopathy, from atrial fibrillation/tachycardia. Patient has a history of coronary artery disease status post PCI to D1 and distal left circumflex 5/30/2023, there was a plan for staged PCI to an RPL lesion. However when patient presented for staged PCI, her dyspnea had resolved, and so after discussion with Dr. Flores, who is the interventional list at the time, the decision was made to hold off in favor of medical management and close monitoring.     LV function has improved on TTE 4/26/2024 LVEF 45-50%. Reviewed detailed note from Key Parnell 8/2024. Pt doing well at that time. Since then, hospitalized for general weakness 2/2025.  Has had mild dyspnea, weight gain. No chest pain. Entresto changed to losartan due to difficulty splitting the tablets from arthritis.     Assessment and Plan/Recommendations:    # Acute on chronic HFrEF 2/2 mixed ICM and NICM, LVEF 45-50%. NYHA I-II. Mildly fluid overloaded  # Persistent  atrial fibrillation, rate controlled, asymptomatic, on apixaban  # CAD s/p PCI to D1 and DCx 5/30/2023, residual high grade RPL lesion medically managed. No angina  # HTN, BP elevated but low normal on prior visits   # Obesity    - will start torsemide 10 mg daily, will hold off on empiric K suppl for now due to normal K and on ARB  - labs in 1 week with RN BP check   - TTE  - continue losartan, metoprolol succinate, atorvastatin, apixaban  - follow up in 3 mo        Thank you for allowing our team to participate in the care of Fanny Myers.  Please do not hesitate to call or page me with any questions or concerns.    Sincerely,     Shawn Craig MD, [facility]  Cardiology  Text Page   February 20, 2025    cc  Key Parnell PA-C  [address] [place] [address]    Voice recognition software utilized.       Past Medical History:   The ASCVD Risk score (Miley DK, et al., 2019) failed to calculate for the following reasons:    Risk score cannot be calculated because patient has a medical history suggesting prior/existing ASCVD  Past Medical History:   Diagnosis Date     Ischemic cardiomyopathy 01/26/2024     NSTEMI (non-ST elevated myocardial infarction) (H) 06/01/2023     Paroxysmal atrial fibrillation (H) 05/25/2023        Past Surgical History:   Past Surgical History:   Procedure Laterality Date     APPENDECTOMY       CV CORONARY ANGIOGRAM N/A 5/30/2023    Procedure: Coronary Angiogram;  Surgeon: Bryant Simental MD;  Location: [facility] CARDIAC CATH LAB     CV PCI N/A 5/30/2023    Procedure: Percutaneous Coronary Intervention;  Surgeon: Bryant Simental MD;  Location: [facility] CARDIAC CATH LAB     ENT SURGERY       GYN SURGERY         Medications (outpatient):  Current Outpatient Medications   Medication Sig Dispense Refill     acetaminophen (TYLENOL) 325 MG tablet Take 325-650 mg by mouth every 6 hours as needed for mild pain.       apixaban ANTICOAGULANT (ELIQUIS) 5 MG tablet Take 1 tablet  "(5 mg) by mouth 2 times daily. 180 tablet 3     atorvastatin (LIPITOR) 40 MG tablet Take 1 tablet (40 mg) by mouth daily 90 tablet 3     diphenhydrAMINE HCl (BENADRYL ALLERGY PO) Take by mouth as needed.       famotidine (PEPCID) 20 MG tablet Take 1 tablet (20 mg) by mouth 2 times daily (Patient taking differently: Take 20 mg by mouth 2 times daily as needed.)       levothyroxine (SYNTHROID/LEVOTHROID) 112 MCG tablet Take 112 mcg by mouth every morning (before breakfast)       losartan (COZAAR) 25 MG tablet Take 1 tablet (25 mg) by mouth daily. 90 tablet 3     metoprolol succinate ER (TOPROL XL) 50 MG 24 hr tablet Take 3 tablets (150 mg) by mouth daily AND 2 tablets (100 mg) at bedtime. (Patient taking differently: Take 3 tablets (150 mg) by mouth in the morning AND 2 tablets (100 mg) at bedtime.) 450 tablet 3     torsemide (DEMADEX) 10 MG tablet Take 1 tablet (10 mg) by mouth daily. 90 tablet 3       Allergies:  Allergies   Allergen Reactions     Digoxin Nausea and Vomiting and Diarrhea     Codeine Sulfate Nausea     Penicillins Other (See Comments)     Mother had severe anaphylaxis - was told as child never take PCN       Social History:   History   Drug Use No      History   Smoking Status     Never   Smokeless Tobacco     Never     Social History    Substance and Sexual Activity      Alcohol use: No       Family History:  Family History   Problem Relation Age of Onset     Heart Disease Mother         Per pt something heart related- unsure what     Heart Disease Father         Per pt something heart related- unsure what       Review of Systems:   A complete review of systems was negative except as mentioned in the History of Present Illness.     Objective & Physical Exam:  BP (!) 145/107 (BP Location: Right arm, Patient Position: Sitting, Cuff Size: Adult Large)   Pulse 82   Ht 1.651 m (5' 5\")   Wt 119.5 kg (263 lb 6.4 oz)   BMI 43.83 kg/m    Wt Readings from Last 2 Encounters:   02/19/25 119.5 kg (263 lb 6.4 " oz)   02/10/25 116 kg (255 lb 11.7 oz)     Body mass index is 43.83 kg/m .   Body surface area is 2.34 meters squared.    Constitutional: appears stated age, in no apparent distress, appears to be well nourished  Pulmonary: clear to auscultation bilaterally, no wheezes, no rales, no increased work of breathing  Cardiovascular: regular rate, regular rhythm, no murmur appreciated, trace to 1+ BLE edema     Data reviewed:  Lab Results   Component Value Date    WBC 10.9 02/11/2025    WBC 14.2 (H) 04/27/2013    RBC 4.66 02/11/2025    RBC 4.46 04/27/2013    HGB 14.9 02/11/2025    HGB 14.0 04/27/2013    HCT 44.4 02/11/2025    HCT 42.1 04/27/2013    MCV 95 02/11/2025    MCV 94 04/27/2013    MCH 32.0 02/11/2025    MCH 31.4 04/27/2013    MCHC 33.6 02/11/2025    MCHC 33.3 04/27/2013    RDW 13.2 02/11/2025    RDW 12.7 04/27/2013     02/11/2025     04/27/2013     Sodium   Date Value Ref Range Status   02/11/2025 135 135 - 145 mmol/L Final   04/27/2013 140 133 - 144 mmol/L Final     Potassium   Date Value Ref Range Status   02/11/2025 4.2 3.4 - 5.3 mmol/L Final   04/27/2013 4.5 3.4 - 5.3 mmol/L Final     Chloride   Date Value Ref Range Status   02/11/2025 100 98 - 107 mmol/L Final   04/27/2013 101 94 - 109 mmol/L Final     Carbon Dioxide   Date Value Ref Range Status   04/27/2013 29 20 - 32 mmol/L Final     Carbon Dioxide (CO2)   Date Value Ref Range Status   02/11/2025 24 22 - 29 mmol/L Final     Anion Gap   Date Value Ref Range Status   02/11/2025 11 7 - 15 mmol/L Final   04/27/2013 10.2 6 - 17 mmol/L Final     Glucose   Date Value Ref Range Status   02/11/2025 104 (H) 70 - 99 mg/dL Final   04/27/2013 139 (H) 60 - 99 mg/dL Final     Urea Nitrogen   Date Value Ref Range Status   02/11/2025 14.2 8.0 - 23.0 mg/dL Final   04/27/2013 19 7 - 30 mg/dL Final     Creatinine   Date Value Ref Range Status   02/11/2025 0.90 0.51 - 0.95 mg/dL Final   04/27/2013 1.21 (H) 0.52 - 1.04 mg/dL Final     GFR Estimate   Date Value Ref  Range Status   02/11/2025 65 >60 mL/min/1.73m2 Final     Comment:     eGFR calculated using 2021 CKD-EPI equation.   04/27/2013 44 (L) >60 mL/min/1.7m2 Final     Calcium   Date Value Ref Range Status   02/11/2025 9.7 8.8 - 10.4 mg/dL Final   04/27/2013 9.5 8.5 - 10.4 mg/dL Final     Bilirubin Total   Date Value Ref Range Status   02/11/2025 1.2 <=1.2 mg/dL Final     Alkaline Phosphatase   Date Value Ref Range Status   02/11/2025 78 40 - 150 U/L Final     ALT   Date Value Ref Range Status   02/11/2025 13 0 - 50 U/L Final     AST   Date Value Ref Range Status   02/11/2025 19 0 - 45 U/L Final     Recent Labs   Lab Test 02/11/25  0528 05/30/23  1731   CHOL 116 135   HDL 63 70   LDL 29 47   TRIG 121 91        Thank you for allowing me to participate in the care of your patient.      Sincerely,     Shawn Craig MD     [facility]  cc:   Key Parnell PA-C  [address] [id number]

## 2025-02-20 ENCOUNTER — TELEPHONE (OUTPATIENT)
Dept: CARDIOLOGY | Facility: CLINIC | Age: 80
End: 2025-02-20
Payer: COMMERCIAL

## 2025-02-20 NOTE — PROGRESS NOTES
Cardiology Clinic Progress Note:    February 20, 2025   Patient Name: Fanny Myers  Patient MRN: [id number]     Consult indication: CHF    HPI:    I had the opportunity to see patient Fanny Myers in cardiology clinic for a follow up visit. Patient is followed by our colleague Royer Silver MD with Primary Care.     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     Patient has a history of heart failure with reduced ejection fraction, LVEF 30 to 35%, attributed to mixed ischemic and nonischemic cardiomyopathy, from atrial fibrillation/tachycardia. Patient has a history of coronary artery disease status post PCI to D1 and distal left circumflex 5/30/2023, there was a plan for staged PCI to an RPL lesion. However when patient presented for staged PCI, her dyspnea had resolved, and so after discussion with Dr. Flores, who is the interventional list at the time, the decision was made to hold off in favor of medical management and close monitoring.     LV function has improved on TTE 4/26/2024 LVEF 45-50%. Reviewed detailed note from Key Parnell 8/2024. Pt doing well at that time. Since then, hospitalized for general weakness 2/2025.  Has had mild dyspnea, weight gain. No chest pain. Entresto changed to losartan due to difficulty splitting the tablets from arthritis.     Assessment and Plan/Recommendations:    # Acute on chronic HFrEF 2/2 mixed ICM and NICM, LVEF 45-50%. NYHA I-II. Mildly fluid overloaded  # Persistent atrial fibrillation, rate controlled, asymptomatic, on apixaban  # CAD s/p PCI to D1 and DCx 5/30/2023, residual high grade RPL lesion medically managed. No angina  # HTN, BP elevated but low normal on prior  visits   # Obesity    - will start torsemide 10 mg daily, will hold off on empiric K suppl for now due to normal K and on ARB  - labs in 1 week with RN BP check   - TTE  - continue losartan, metoprolol succinate, atorvastatin, apixaban  - follow up in 3 mo        Thank you for allowing our team to participate in the care of Fanny Myers.  Please do not hesitate to call or page me with any questions or concerns.    Sincerely,     Shawn Craig MD, [facility]  Cardiology  Text Page   February 20, 2025    cc  Key Parnell PA-C  [address] [facility] [place] [facility] [place] [address]    Voice recognition software utilized.       Past Medical History:   The ASCVD Risk score (Miley DK, et al., 2019) failed to calculate for the following reasons:    Risk score cannot be calculated because patient has a medical history suggesting prior/existing ASCVD  Past Medical History:   Diagnosis Date    Ischemic cardiomyopathy 01/26/2024    NSTEMI (non-ST elevated myocardial infarction) (H) 06/01/2023    Paroxysmal atrial fibrillation (H) 05/25/2023        Past Surgical History:   Past Surgical History:   Procedure Laterality Date    APPENDECTOMY      CV CORONARY ANGIOGRAM N/A 5/30/2023    Procedure: Coronary Angiogram;  Surgeon: Bryant Simental MD;  Location: [facility] CARDIAC CATH LAB    CV PCI N/A 5/30/2023    Procedure: Percutaneous Coronary Intervention;  Surgeon: Bryant Simental MD;  Location: [facility] CARDIAC CATH LAB    ENT SURGERY      GYN SURGERY         Medications (outpatient):  Current Outpatient Medications   Medication Sig Dispense Refill    acetaminophen (TYLENOL) 325 MG tablet Take 325-650 mg by mouth every 6 hours as needed for mild pain.      apixaban ANTICOAGULANT (ELIQUIS) 5 MG tablet Take 1 tablet (5 mg) by mouth 2 times daily. 180 tablet 3    atorvastatin (LIPITOR) 40 MG tablet Take 1 tablet (40 mg) by mouth daily 90 tablet 3    diphenhydrAMINE HCl (BENADRYL ALLERGY PO) Take by mouth as needed.      famotidine  "(PEPCID) 20 MG tablet Take 1 tablet (20 mg) by mouth 2 times daily (Patient taking differently: Take 20 mg by mouth 2 times daily as needed.)      levothyroxine (SYNTHROID/LEVOTHROID) 112 MCG tablet Take 112 mcg by mouth every morning (before breakfast)      losartan (COZAAR) 25 MG tablet Take 1 tablet (25 mg) by mouth daily. 90 tablet 3    metoprolol succinate ER (TOPROL XL) 50 MG 24 hr tablet Take 3 tablets (150 mg) by mouth daily AND 2 tablets (100 mg) at bedtime. (Patient taking differently: Take 3 tablets (150 mg) by mouth in the morning AND 2 tablets (100 mg) at bedtime.) 450 tablet 3    torsemide (DEMADEX) 10 MG tablet Take 1 tablet (10 mg) by mouth daily. 90 tablet 3       Allergies:  Allergies   Allergen Reactions    Digoxin Nausea and Vomiting and Diarrhea    Codeine Sulfate Nausea    Penicillins Other (See Comments)     Mother had severe anaphylaxis - was told as child never take PCN       Social History:   History   Drug Use No      History   Smoking Status    Never   Smokeless Tobacco    Never     Social History    Substance and Sexual Activity      Alcohol use: No       Family History:  Family History   Problem Relation Age of Onset    Heart Disease Mother         Per pt something heart related- unsure what    Heart Disease Father         Per pt something heart related- unsure what       Review of Systems:   A complete review of systems was negative except as mentioned in the History of Present Illness.     Objective & Physical Exam:  BP (!) 145/107 (BP Location: Right arm, Patient Position: Sitting, Cuff Size: Adult Large)   Pulse 82   Ht 1.651 m (5' 5\")   Wt 119.5 kg (263 lb 6.4 oz)   BMI 43.83 kg/m    Wt Readings from Last 2 Encounters:   02/19/25 119.5 kg (263 lb 6.4 oz)   02/10/25 116 kg (255 lb 11.7 oz)     Body mass index is 43.83 kg/m .   Body surface area is 2.34 meters squared.    Constitutional: appears stated age, in no apparent distress, appears to be well nourished  Pulmonary: clear " to auscultation bilaterally, no wheezes, no rales, no increased work of breathing  Cardiovascular: regular rate, regular rhythm, no murmur appreciated, trace to 1+ BLE edema     Data reviewed:  Lab Results   Component Value Date    WBC 10.9 02/11/2025    WBC 14.2 (H) 04/27/2013    RBC 4.66 02/11/2025    RBC 4.46 04/27/2013    HGB 14.9 02/11/2025    HGB 14.0 04/27/2013    HCT 44.4 02/11/2025    HCT 42.1 04/27/2013    MCV 95 02/11/2025    MCV 94 04/27/2013    MCH 32.0 02/11/2025    MCH 31.4 04/27/2013    MCHC 33.6 02/11/2025    MCHC 33.3 04/27/2013    RDW 13.2 02/11/2025    RDW 12.7 04/27/2013     02/11/2025     04/27/2013     Sodium   Date Value Ref Range Status   02/11/2025 135 135 - 145 mmol/L Final   04/27/2013 140 133 - 144 mmol/L Final     Potassium   Date Value Ref Range Status   02/11/2025 4.2 3.4 - 5.3 mmol/L Final   04/27/2013 4.5 3.4 - 5.3 mmol/L Final     Chloride   Date Value Ref Range Status   02/11/2025 100 98 - 107 mmol/L Final   04/27/2013 101 94 - 109 mmol/L Final     Carbon Dioxide   Date Value Ref Range Status   04/27/2013 29 20 - 32 mmol/L Final     Carbon Dioxide (CO2)   Date Value Ref Range Status   02/11/2025 24 22 - 29 mmol/L Final     Anion Gap   Date Value Ref Range Status   02/11/2025 11 7 - 15 mmol/L Final   04/27/2013 10.2 6 - 17 mmol/L Final     Glucose   Date Value Ref Range Status   02/11/2025 104 (H) 70 - 99 mg/dL Final   04/27/2013 139 (H) 60 - 99 mg/dL Final     Urea Nitrogen   Date Value Ref Range Status   02/11/2025 14.2 8.0 - 23.0 mg/dL Final   04/27/2013 19 7 - 30 mg/dL Final     Creatinine   Date Value Ref Range Status   02/11/2025 0.90 0.51 - 0.95 mg/dL Final   04/27/2013 1.21 (H) 0.52 - 1.04 mg/dL Final     GFR Estimate   Date Value Ref Range Status   02/11/2025 65 >60 mL/min/1.73m2 Final     Comment:     eGFR calculated using 2021 CKD-EPI equation.   04/27/2013 44 (L) >60 mL/min/1.7m2 Final     Calcium   Date Value Ref Range Status   02/11/2025 9.7 8.8 - 10.4  mg/dL Final   04/27/2013 9.5 8.5 - 10.4 mg/dL Final     Bilirubin Total   Date Value Ref Range Status   02/11/2025 1.2 <=1.2 mg/dL Final     Alkaline Phosphatase   Date Value Ref Range Status   02/11/2025 78 40 - 150 U/L Final     ALT   Date Value Ref Range Status   02/11/2025 13 0 - 50 U/L Final     AST   Date Value Ref Range Status   02/11/2025 19 0 - 45 U/L Final     Recent Labs   Lab Test 02/11/25  0528 05/30/23  1731   CHOL 116 135   HDL 63 70   LDL 29 47   TRIG 121 91

## 2025-02-20 NOTE — TELEPHONE ENCOUNTER
Patient returned call to team 2 nurse line and informed of Dr. Craig's recommendation for echocardiogram. Patient verbalizes understanding. Would like echo done same day as labs and nurse visit, 2/25/25 due to difficulty securing transportation. Scheduling messaged.     UPDATE 1120: Per scheduling, no availability in [facility] on same day for echocardiogram. Patient called and informed of this. Instructed to call scheduling to make appointment another date and provided with pone number. Patient verbalizes understanding.

## 2025-02-20 NOTE — TELEPHONE ENCOUNTER
----- Message from Shawn Craig sent at 2/20/2025  9:23 AM CST -----  Regarding: TTE  Hi could you please let the patient know that I ordered a TTE? I saw her yesterday and didn't mention it but I would like to reassess her LV function since she was hospitalized for the weakness thanks!      Left a message for patient to call back to review recommendations.

## 2025-02-25 ENCOUNTER — ALLIED HEALTH/NURSE VISIT (OUTPATIENT)
Dept: CARDIOLOGY | Facility: CLINIC | Age: 80
End: 2025-02-25
Payer: COMMERCIAL

## 2025-02-25 ENCOUNTER — TELEPHONE (OUTPATIENT)
Dept: CARDIOLOGY | Facility: CLINIC | Age: 80
End: 2025-02-25

## 2025-02-25 ENCOUNTER — LAB (OUTPATIENT)
Dept: LAB | Facility: CLINIC | Age: 80
End: 2025-02-25
Payer: COMMERCIAL

## 2025-02-25 VITALS — SYSTOLIC BLOOD PRESSURE: 126 MMHG | DIASTOLIC BLOOD PRESSURE: 86 MMHG | HEART RATE: 76 BPM

## 2025-02-25 DIAGNOSIS — I50.23 ACUTE ON CHRONIC SYSTOLIC HEART FAILURE (H): ICD-10-CM

## 2025-02-25 LAB
ANION GAP SERPL CALCULATED.3IONS-SCNC: 16 MMOL/L (ref 7–15)
BUN SERPL-MCNC: 27.3 MG/DL (ref 8–23)
CALCIUM SERPL-MCNC: 9.8 MG/DL (ref 8.8–10.4)
CHLORIDE SERPL-SCNC: 95 MMOL/L (ref 98–107)
CREAT SERPL-MCNC: 1.1 MG/DL (ref 0.51–0.95)
EGFRCR SERPLBLD CKD-EPI 2021: 51 ML/MIN/1.73M2
GLUCOSE SERPL-MCNC: 116 MG/DL (ref 70–99)
HCO3 SERPL-SCNC: 21 MMOL/L (ref 22–29)
NT-PROBNP SERPL-MCNC: 2265 PG/ML (ref 0–1800)
POTASSIUM SERPL-SCNC: 4.6 MMOL/L (ref 3.4–5.3)
SODIUM SERPL-SCNC: 132 MMOL/L (ref 135–145)

## 2025-02-25 PROCEDURE — 3074F SYST BP LT 130 MM HG: CPT

## 2025-02-25 PROCEDURE — 99207 PR NO CHARGE LOS: CPT

## 2025-02-25 PROCEDURE — 83880 ASSAY OF NATRIURETIC PEPTIDE: CPT

## 2025-02-25 PROCEDURE — 80048 BASIC METABOLIC PNL TOTAL CA: CPT

## 2025-02-25 PROCEDURE — 3079F DIAST BP 80-89 MM HG: CPT

## 2025-02-25 PROCEDURE — 36415 COLL VENOUS BLD VENIPUNCTURE: CPT

## 2025-02-25 NOTE — TELEPHONE ENCOUNTER
What needs to be scheduled: echo    Orders in Epic: Yes    -If no, please enter appropriate order-    Location:  [facility]    Is there a slot on hold:        Does scheduling need to reach out to patient to confirm: Yes    Are there future appts and/or testing that needs to be canceled:  No    Additional Comments:  Pt left vm asking for call to schedule echo.         Future Appointments   Date Time Provider Department Center   2/25/2025  1:45 PM RU LAB [facility]   2/25/2025  2:15 PM RU UMP CARD [facility]   5/19/2025 10:40 AM Mira Davis PA-C [facility]         MIRTHA Rhodes, RN 12:11 PM 02/25/25

## 2025-02-25 NOTE — PROGRESS NOTES
[facility] BLOOD PRESSURE CHECK     Last office visit: 2/19/25    Previous blood pressure: 145/107 mm Hg  Previous heart rate: 82 bpm      Time of visit: 1:30 pm    Morning medications were taken at: 7:00 am     Today's blood pressure: 126/86 mm Hg  Today's heart rate: 76 bpm       Additional Comments: N/A      Results routed to: Team 2      Ordering Provider: Dr. Craig  In clinic Provider: Dr. Ruth

## 2025-02-26 NOTE — TELEPHONE ENCOUNTER
Echo scheduled      Future Appointments   Date Time Provider Department Center   5/16/2025 12:30 PM [id number] [facility] RS   5/19/2025 10:40 AM [facility]       Belkys Cao, VINN, RN 3:05 PM 02/26/25

## 2025-03-09 ENCOUNTER — HEALTH MAINTENANCE LETTER (OUTPATIENT)
Age: 80
End: 2025-03-09

## 2025-04-02 ENCOUNTER — TELEPHONE (OUTPATIENT)
Dept: CARDIOLOGY | Facility: CLINIC | Age: 80
End: 2025-04-02
Payer: COMMERCIAL

## 2025-04-02 NOTE — TELEPHONE ENCOUNTER
Message from Dr. Craig re: bmp  Shawn Craig MD  P [facility] Heart Team 2  Results reviewed, please let the patient know that overall findings are reassuring, renal function has improved. Continue current regimen.  Follow up as previously planned    =============  Patient is scheduled for an echo on 5/16/2025  Patient to see CAMMIE Mira Davis on 5/19/2025    0950 attempted to contact patient to discuss Dr. Craig's review of bmp. Left a detailed message with update. Results letter sent as follow up.    Ms. Louis Painter,    Dr. Craig reviewed your latest lab results and said: the overall findings are reassuring, renal function has improved. Continue current regimen.    Your next appointment is for an echo on 5/16/2025.  Then you see CAMMIE Mira Davis on 5/19/2025.    Please call if you have any questions  Team 2 R.N.s  [phone]

## 2025-04-02 NOTE — LETTER
April 2, 2025       TO: Fanny Myers   [address]  [facility] [address]       HelloMs. Myers,    Dr. Craig reviewed your latest lab results and said: the overall findings are reassuring, renal function has improved. Continue current regimen.    Your next appointment is for an echo on 5/16/2025.  Then you see CAMMIE Mira Davis on 5/19/2025.    Component      Latest Ref Rng 3/28/2025  1:41 PM   Sodium      135 - 145 mmol/L 137    Potassium      3.4 - 5.3 mmol/L 4.6    Chloride      98 - 107 mmol/L 101    Carbon Dioxide (CO2)      22 - 29 mmol/L 23    Anion Gap      7 - 15 mmol/L 13    Urea Nitrogen      8.0 - 23.0 mg/dL 17.1    Creatinine      0.51 - 0.95 mg/dL 1.07 (H)    GFR Estimate      >60 mL/min/1.73m2 53 (L)    Calcium      8.8 - 10.4 mg/dL 9.8    Glucose      70 - 99 mg/dL 110 (H)       Legend:  (H) High  (L) Low    Please call if you have any questions  Team 2 R.N.s  [phone]  [facility]

## 2025-04-15 DIAGNOSIS — R79.89 ELEVATED TROPONIN: ICD-10-CM

## 2025-04-15 RX ORDER — ATORVASTATIN CALCIUM 40 MG/1
40 TABLET, FILM COATED ORAL DAILY
Qty: 90 TABLET | Refills: 3 | Status: SHIPPED | OUTPATIENT
Start: 2025-04-15

## 2025-05-16 ENCOUNTER — HOSPITAL ENCOUNTER (OUTPATIENT)
Dept: CARDIOLOGY | Facility: CLINIC | Age: 80
Discharge: HOME | End: 2025-05-16
Attending: INTERNAL MEDICINE | Admitting: INTERNAL MEDICINE
Payer: COMMERCIAL

## 2025-05-16 ENCOUNTER — RESULTS FOLLOW-UP (OUTPATIENT)
Dept: CARDIOLOGY | Facility: CLINIC | Age: 80
End: 2025-05-16

## 2025-05-16 DIAGNOSIS — I50.23 ACUTE ON CHRONIC SYSTOLIC HEART FAILURE (H): ICD-10-CM

## 2025-05-16 LAB — LVEF ECHO: NORMAL

## 2025-05-16 PROCEDURE — 93306 TTE W/DOPPLER COMPLETE: CPT | Mod: 26 | Performed by: INTERNAL MEDICINE

## 2025-05-16 PROCEDURE — 93306 TTE W/DOPPLER COMPLETE: CPT

## 2025-05-16 NOTE — PROGRESS NOTES
Cardiology Clinic Progress Note  Fanny Myers MRN# [id number]   YOB: 1945 Age: 80 year old   Primary Cardiologist: Dr. Craig  Reason for visit: Follow up             Assessment and Plan:   Fanny Myers is a 80 year old female who is here today for follow up.      1.  HFrEF, Mixed ICMP and NICMP - TTE 5/16/25 LVEF 50-55% Recovered.    - Mixed etiology, ischemic and nonischemic (tachycardia mediated)   - Diuretics: torsemide 10 mg MWF. Appears euvolemic    - GDMT: metoprolol 150 mg daily and 100 mg nightly, losartan 25 mg daily    - [facility], [facility] 8/2024     2. Coronary Artery Disease (s/p PCI with LONG dCFX and D1; residual high-grade rPLA stenosis medically managed 5/2023)    - Denies any symptoms concerning for angina    - No aspirin with Eliquis, statin, metoprolol, losartan  - Recommend 150 minutes/week of moderate intensity exercise and following Mediterranean diet    3. Persistent Atrial Fibrillation -[id number] score of 5 (age +2, hypertension, coronary disease, CHF) Eliquis 5 mg twice daily.  Metoprolol succinate 150 mg daily and 100 mg at night    4.  Hypertension - losartan 25 mg daily, losartan 25 mg daily, metoprolol succinate 150 mg daily and 100 mg at night, torsemide 10 mg MWF     5.  Hyperlipidemia - FLP 2/25 , HDL 63, LDL 29, ; on Atorvastatin 40 mg daily       Today's Plan:   - Minerva presents today for a 3-month follow-up after medication change.  She has been doing quite well on the torsemide 10 mg Monday Wednesday Friday.  Denies any orthopnea, lower extremity edema, shortness of breath/dyspnea on exertion.  She has been tolerating torsemide quite well.  I did discuss with her if she did need an additional dose of torsemide or worsening lower extremity edema/weight gain she is able to do this.    -Continue cardiac medications without any changes    Follow up plan:   - Follow-up in 6 months or sooner if needed with BMP prior to  follow-up      MARY [facility]        History of Presenting Illness:    Fanny Myers is a very pleasant 80 year old female with mixed ischemic and nonischemic cardiomyopathy, HFrEF, coronary disease (s/p LONG to distal circumflex and D1, medically managed on RPL lesion 5/2023), persistent atrial fibrillation, hypertension, hyperlipidemia, hypothyroidism, and suspected obstructive sleep apnea.     Minerva has a fairly extensive cardiac history as listed above.  In May 2023 she was found to have heart failure with reduced ejection fraction, LVEF 25-30%.  She underwent PCI with LONG to D1 and distal left circumflex.  Plans for staged PCI to RPL lesion however when she presented for staged PCI dyspnea and resolved so was recommended to continue medical management.  Suspected component of nonischemic cardiomyopathy secondary to atrial fibrillation/tachycardia.    She had previously been enrolled in [facility], closely and started on GDMT.  On her follow-up 8/2024 patient's EF normalized to 50-55% and she was graduated from the [facility].     Her last follow-up with Dr. Craig 2/2025 she appeared mildly fluid overloaded.  She was started on torsemide 10 mg daily.  On repeat BMP she appeared slightly dehydrated and this was changed to torsemide 10 mg Monday Wednesday Friday.    TTE 5/16/2025 showed LVEF 50-55%, to be a technically difficult study given patient refused IV contrast.    Patient is here today for follow up. Patient reports feeling quite well.  She has been tolerating torsemide well.  She is able to sleep her rings on and off her finger without any difficulties which she was not able to do prior to starting the torsemide.  Denies any shortness of breath, orthopnea, dyspnea on exertion, PND, lower extremity edema, chest pain, palpitations, lightheadedness, dizziness, near-syncope, or syncope.    Blood pressure 124/72 and HR 80 in clinic today.  Weight 118 lbs.     Recent  Labs  BMP 3/28/2025 with stable electrolytes, improved renal function compared to prior.        Social History       Social History     Socioeconomic History    Marital status:      Spouse name: Not on file    Number of children: Not on file    Years of education: Not on file    Highest education level: Not on file   Occupational History    Not on file   Tobacco Use    Smoking status: Never    Smokeless tobacco: Never   Substance and Sexual Activity    Alcohol use: No    Drug use: No    Sexual activity: Not on file   Other Topics Concern    Not on file   Social History Narrative    Not on file     Social Drivers of Health     Financial Resource Strain: Low Risk  (3/12/2025)    [facility]    Financial Resource Strain     Difficulty of Paying Living Expenses: 3     Difficulty of Paying Living Expenses: Not on file   Food Insecurity: No Food Insecurity (3/12/2025)    [facility]    Food Insecurity     Do you worry your food will run out before you are able to buy more?: 1   Transportation Needs: No Transportation Needs (3/12/2025)    [facility]    Transportation Needs     Does lack of transportation keep you from medical appointments?: 1     Does lack of transportation keep you from work, meetings or getting things that you need?: 1   Physical Activity: Not on file   Stress: Not on file   Social Connections: Socially Integrated (3/12/2025)    [facility] from [facility]    Social Connections     Do you often feel lonely or isolated from those around you?: 0   Interpersonal Safety: Low Risk  (2/10/2025)    Interpersonal Safety     Do you feel physically and emotionally safe where you currently live?: Yes     Within the past 12 months, have you been hit, slapped, kicked or otherwise physically hurt by someone?: No     Within the past 12  months, have you been humiliated or emotionally abused in other ways by your partner or ex-partner?: No   Housing Stability: Low Risk  (3/12/2025)    Received from [employer] [facility]    Housing Stability     What is your housing situation today?: 1            Review of Systems:   Please see HPI         Physical Exam:   Vitals: There were no vitals taken for this visit.   Wt Readings from Last 4 Encounters:   02/19/25 119.5 kg (263 lb 6.4 oz)   02/10/25 116 kg (255 lb 11.7 oz)   08/15/24 114.4 kg (252 lb 1.6 oz)   04/30/24 111.8 kg (246 lb 6.4 oz)     GEN: well nourished, in no acute distress.  HEENT:  Pupils equal, round. Sclerae nonicteric.   NECK: Supple, no masses appreciated.  Difficult to assess JVD.  C/V: Irregularly irregular  rhythm, normal rate, no murmur, rub or gallop.   RESP: Respirations are unlabored. Clear to auscultation bilaterally without wheezing, rales, or rhonchi.  GI: Abdomen soft, nontender.  EXTREM: Trace to 1+ bilateral LE edema.  NEURO: Alert and oriented, cooperative.  SKIN: Warm and dry.       Data:   LIPID RESULTS:  Lab Results   Component Value Date    CHOL 116 02/11/2025    HDL 63 02/11/2025    LDL 29 02/11/2025    TRIG 121 02/11/2025     LIVER ENZYME RESULTS:  Lab Results   Component Value Date    AST 19 02/11/2025    ALT 13 02/11/2025     CBC RESULTS:  Lab Results   Component Value Date    WBC 10.9 02/11/2025    WBC 14.2 (H) 04/27/2013    RBC 4.66 02/11/2025    RBC 4.46 04/27/2013    HGB 14.9 02/11/2025    HGB 14.0 04/27/2013    HCT 44.4 02/11/2025    HCT 42.1 04/27/2013    MCV 95 02/11/2025    MCV 94 04/27/2013    MCH 32.0 02/11/2025    MCH 31.4 04/27/2013    MCHC 33.6 02/11/2025    MCHC 33.3 04/27/2013    RDW 13.2 02/11/2025    RDW 12.7 04/27/2013     02/11/2025     04/27/2013     BMP RESULTS:  Lab Results   Component Value Date     03/28/2025     04/27/2013    POTASSIUM 4.6 03/28/2025    POTASSIUM 4.5 04/27/2013    CHLORIDE 101  "03/28/2025    CHLORIDE 101 04/27/2013    CO2 23 03/28/2025    CO2 29 04/27/2013    ANIONGAP 13 03/28/2025    ANIONGAP 10.2 04/27/2013     (H) 03/28/2025     (H) 04/27/2013    BUN 17.1 03/28/2025    BUN 19 04/27/2013    CR 1.07 (H) 03/28/2025    CR 1.21 (H) 04/27/2013    GFRESTIMATED 53 (L) 03/28/2025    GFRESTIMATED 44 (L) 04/27/2013    GFRESTBLACK 54 (L) 04/27/2013    DANIE 9.8 03/28/2025    DANIE 9.5 04/27/2013      A1C RESULTS:  No results found for: \"A1C\"  INR RESULTS:  Lab Results   Component Value Date    INR 1.15 06/21/2023            Medications     Current Outpatient Medications   Medication Sig Dispense Refill    acetaminophen (TYLENOL) 325 MG tablet Take 325-650 mg by mouth every 6 hours as needed for mild pain.      apixaban ANTICOAGULANT (ELIQUIS) 5 MG tablet Take 1 tablet (5 mg) by mouth 2 times daily. 180 tablet 3    atorvastatin (LIPITOR) 40 MG tablet Take 1 tablet (40 mg) by mouth daily. 90 tablet 3    diphenhydrAMINE HCl (BENADRYL ALLERGY PO) Take by mouth as needed.      famotidine (PEPCID) 20 MG tablet Take 1 tablet (20 mg) by mouth 2 times daily (Patient taking differently: Take 20 mg by mouth 2 times daily as needed.)      levothyroxine (SYNTHROID/LEVOTHROID) 112 MCG tablet Take 112 mcg by mouth every morning (before breakfast)      losartan (COZAAR) 25 MG tablet Take 1 tablet (25 mg) by mouth daily. 90 tablet 3    metoprolol succinate ER (TOPROL XL) 50 MG 24 hr tablet Take 3 tablets (150 mg) by mouth daily AND 2 tablets (100 mg) at bedtime. (Patient taking differently: Take 3 tablets (150 mg) by mouth in the morning AND 2 tablets (100 mg) at bedtime.) 450 tablet 3    torsemide (DEMADEX) 10 MG tablet Take 1 tab (10mg) on MWF            Past Medical History     Past Medical History:   Diagnosis Date    Ischemic cardiomyopathy 01/26/2024    NSTEMI (non-ST elevated myocardial infarction) (H) 06/01/2023    Paroxysmal atrial fibrillation (H) 05/25/2023     Past Surgical History: "   Procedure Laterality Date    APPENDECTOMY      CV CORONARY ANGIOGRAM N/A 5/30/2023    Procedure: Coronary Angiogram;  Surgeon: Bryant Simental MD;  Location:  HEART CARDIAC CATH LAB    CV PCI N/A 5/30/2023    Procedure: Percutaneous Coronary Intervention;  Surgeon: Bryant Simental MD;  Location:  HEART CARDIAC CATH LAB    ENT SURGERY      GYN SURGERY       Family History   Problem Relation Age of Onset    Heart Disease Mother         Per pt something heart related- unsure what    Heart Disease Father         Per pt something heart related- unsure what            Allergies   Digoxin, Codeine sulfate, and Penicillins    35 minutes spent on the date of the encounter doing chart review, history and exam, documentation and further activities as noted above

## 2025-05-19 ENCOUNTER — OFFICE VISIT (OUTPATIENT)
Dept: CARDIOLOGY | Facility: CLINIC | Age: 80
End: 2025-05-19
Attending: INTERNAL MEDICINE
Payer: COMMERCIAL

## 2025-05-19 VITALS
HEART RATE: 80 BPM | HEIGHT: 65 IN | DIASTOLIC BLOOD PRESSURE: 72 MMHG | WEIGHT: 262 LBS | SYSTOLIC BLOOD PRESSURE: 124 MMHG | BODY MASS INDEX: 43.65 KG/M2

## 2025-05-19 DIAGNOSIS — I50.23 ACUTE ON CHRONIC SYSTOLIC HEART FAILURE (H): ICD-10-CM

## 2025-05-19 NOTE — PATIENT INSTRUCTIONS
It was nice seeing you today, please call [phone] if you have any questions or concerns     Plan   No Medication Changes, continue current cardiac medications without changes  If needed you can take additional dose of torsemide for weight gain/lower extremity   Follow up    - Follow up in 6 months or sooner if needed, labs prior to appointment   - Scheduling phone number [phone]    Mira Davis PA-C  [facility]

## 2025-05-19 NOTE — LETTER
5/19/2025    Royer iSlver MD  [address]  [place] [address]    RE: Fanny Myers       Dear Colleague,     I had the pleasure of seeing Fanny Myers in the [facility].  Cardiology Clinic Progress Note  Fanny Myers MRN# [id number]   YOB: 1945 Age: 80 year old   Primary Cardiologist: Dr. Craig  Reason for visit: Follow up             Assessment and Plan:   Fanny Myers is a 80 year old female who is here today for follow up.      1.  HFrEF, Mixed ICMP and NICMP - TTE 5/16/25 LVEF 50-55% Recovered.    - Mixed etiology, ischemic and nonischemic (tachycardia mediated)   - Diuretics: torsemide 10 mg MWF. Appears euvolemic    - GDMT: metoprolol 150 mg daily and 100 mg nightly, losartan 25 mg daily    - Previously followed [facility], graduated 8/2024     2. Coronary Artery Disease (s/p PCI with LONG dCFX and D1; residual high-grade rPLA stenosis medically managed 5/2023)    - Denies any symptoms concerning for angina    - No aspirin with Eliquis, statin, metoprolol, losartan  - Recommend 150 minutes/week of moderate intensity exercise and following Mediterranean diet    3. Persistent Atrial Fibrillation -[id number] score of 5 (age +2, hypertension, coronary disease, CHF) Eliquis 5 mg twice daily.  Metoprolol succinate 150 mg daily and 100 mg at night    4.  Hypertension - losartan 25 mg daily, losartan 25 mg daily, metoprolol succinate 150 mg daily and 100 mg at night, torsemide 10 mg MWF     5.  Hyperlipidemia - FLP 2/25 , HDL 63, LDL 29, ; on Atorvastatin 40 mg daily       Today's Plan:   - Minerva presents today for a 3-month follow-up after medication change.  She has been doing quite well on the torsemide 10 mg Monday Wednesday Friday.  Denies any orthopnea, lower extremity edema, shortness of breath/dyspnea on exertion.  She has been tolerating torsemide quite well.  I did discuss with her if she did need an additional dose of torsemide or worsening  lower extremity edema/weight gain she is able to do this.    -Continue cardiac medications without any changes    Follow up plan:   - Follow-up in 6 months or sooner if needed with [facility] prior to follow-up      Mira Davis PA-C  [facility]        History of Presenting Illness:    Fanny Myers is a very pleasant 80 year old female with mixed ischemic and nonischemic cardiomyopathy, HFrEF, coronary disease (s/p LONG to distal circumflex and D1, medically managed on RPL lesion 5/2023), persistent atrial fibrillation, hypertension, hyperlipidemia, hypothyroidism, and suspected obstructive sleep apnea.     Minerva has a fairly extensive cardiac history as listed above.  In May 2023 she was found to have heart failure with reduced ejection fraction, LVEF 25-30%.  She underwent PCI with LONG to D1 and distal left circumflex.  Plans for staged PCI to RPL lesion however when she presented for staged PCI dyspnea and resolved so was recommended to continue medical management.  Suspected component of nonischemic cardiomyopathy secondary to atrial fibrillation/tachycardia.    She had previously been enrolled in [facility], closely and started on GDMT.  On her follow-up 8/2024 patient's EF normalized to 50-55% and she was graduated [facility] the [facility].     Her last follow-up with Dr. Craig 2/2025 she appeared mildly fluid overloaded.  She was started on torsemide 10 mg daily.  On repeat BMP she appeared slightly dehydrated and this was changed to torsemide 10 mg Monday Wednesday Friday.    TTE 5/16/2025 showed LVEF 50-55%, to be a technically difficult study given patient refused IV contrast.    Patient is here today for follow up. Patient reports feeling quite well.  She has been tolerating torsemide well.  She is able to sleep her rings on and off her finger without any difficulties which she was not able to do prior to starting the torsemide.  Denies any shortness of breath, orthopnea, dyspnea on exertion,  PND, lower extremity edema, chest pain, palpitations, lightheadedness, dizziness, near-syncope, or syncope.    Blood pressure 124/72 and HR 80 in clinic today.  Weight 118 lbs.     Recent Labs  BMP 3/28/2025 with stable electrolytes, improved renal function compared to prior.        Social History       Social History     Socioeconomic History     Marital status:      Spouse name: Not on file     Number of children: Not on file     Years of education: Not on file     Highest education level: Not on file   Occupational History     Not on file   Tobacco Use     Smoking status: Never     Smokeless tobacco: Never   Substance and Sexual Activity     Alcohol use: No     Drug use: No     Sexual activity: Not on file   Other Topics Concern     Not on file   Social History Narrative     Not on file     Social Drivers of Health     Financial Resource Strain: Low Risk  (3/12/2025)    Received from [facility]    Financial Resource Strain      Difficulty of Paying Living Expenses: 3      Difficulty of Paying Living Expenses: Not on file   Food Insecurity: No Food Insecurity (3/12/2025)    Received from [facility]    Food Insecurity      Do you worry your food will run out before you are able to buy more?: 1   Transportation Needs: No Transportation Needs (3/12/2025)    Received from [facility]    Transportation Needs      Does lack of transportation keep you from medical appointments?: 1      Does lack of transportation keep you from work, meetings or getting things that you need?: 1   Physical Activity: Not on file   Stress: Not on file   Social Connections: Socially Integrated (3/12/2025)    Received from [facility]    Social Connections      Do you often feel lonely or isolated from those around you?: 0   Interpersonal Safety: Low Risk  (2/10/2025)    Interpersonal Safety      Do you  feel physically and emotionally safe where you currently live?: Yes      Within the past 12 months, have you been hit, slapped, kicked or otherwise physically hurt by someone?: No      Within the past 12 months, have you been humiliated or emotionally abused in other ways by your partner or ex-partner?: No   Housing Stability: Low Risk  (3/12/2025)    Received from [facility]    Housing Stability      What is your housing situation today?: 1            Review of Systems:   Please see HPI         Physical Exam:   Vitals: There were no vitals taken for this visit.   Wt Readings from Last 4 Encounters:   02/19/25 119.5 kg (263 lb 6.4 oz)   02/10/25 116 kg (255 lb 11.7 oz)   08/15/24 114.4 kg (252 lb 1.6 oz)   04/30/24 111.8 kg (246 lb 6.4 oz)     GEN: well nourished, in no acute distress.  HEENT:  Pupils equal, round. Sclerae nonicteric.   NECK: Supple, no masses appreciated.  Difficult to assess JVD.  C/V: Irregularly irregular  rhythm, normal rate, no murmur, rub or gallop.   RESP: Respirations are unlabored. Clear to auscultation bilaterally without wheezing, rales, or rhonchi.  GI: Abdomen soft, nontender.  EXTREM: Trace to 1+ bilateral LE edema.  NEURO: Alert and oriented, cooperative.  SKIN: Warm and dry.       Data:   LIPID RESULTS:  Lab Results   Component Value Date    CHOL 116 02/11/2025    HDL 63 02/11/2025    LDL 29 02/11/2025    TRIG 121 02/11/2025     LIVER ENZYME RESULTS:  Lab Results   Component Value Date    AST 19 02/11/2025    ALT 13 02/11/2025     CBC RESULTS:  Lab Results   Component Value Date    WBC 10.9 02/11/2025    WBC 14.2 (H) 04/27/2013    RBC 4.66 02/11/2025    RBC 4.46 04/27/2013    HGB 14.9 02/11/2025    HGB 14.0 04/27/2013    HCT 44.4 02/11/2025    HCT 42.1 04/27/2013    MCV 95 02/11/2025    MCV 94 04/27/2013    MCH 32.0 02/11/2025    MCH 31.4 04/27/2013    MCHC 33.6 02/11/2025    MCHC 33.3 04/27/2013    RDW 13.2 02/11/2025    RDW 12.7 04/27/2013    PLT  "263 02/11/2025     04/27/2013     BMP RESULTS:  Lab Results   Component Value Date     03/28/2025     04/27/2013    POTASSIUM 4.6 03/28/2025    POTASSIUM 4.5 04/27/2013    CHLORIDE 101 03/28/2025    CHLORIDE 101 04/27/2013    CO2 23 03/28/2025    CO2 29 04/27/2013    ANIONGAP 13 03/28/2025    ANIONGAP 10.2 04/27/2013     (H) 03/28/2025     (H) 04/27/2013    BUN 17.1 03/28/2025    BUN 19 04/27/2013    CR 1.07 (H) 03/28/2025    CR 1.21 (H) 04/27/2013    GFRESTIMATED 53 (L) 03/28/2025    GFRESTIMATED 44 (L) 04/27/2013    GFRESTBLACK 54 (L) 04/27/2013    DANIE 9.8 03/28/2025    DANIE 9.5 04/27/2013      A1C RESULTS:  No results found for: \"A1C\"  INR RESULTS:  Lab Results   Component Value Date    INR 1.15 06/21/2023            Medications     Current Outpatient Medications   Medication Sig Dispense Refill     acetaminophen (TYLENOL) 325 MG tablet Take 325-650 mg by mouth every 6 hours as needed for mild pain.       apixaban ANTICOAGULANT (ELIQUIS) 5 MG tablet Take 1 tablet (5 mg) by mouth 2 times daily. 180 tablet 3     atorvastatin (LIPITOR) 40 MG tablet Take 1 tablet (40 mg) by mouth daily. 90 tablet 3     diphenhydrAMINE HCl (BENADRYL ALLERGY PO) Take by mouth as needed.       famotidine (PEPCID) 20 MG tablet Take 1 tablet (20 mg) by mouth 2 times daily (Patient taking differently: Take 20 mg by mouth 2 times daily as needed.)       levothyroxine (SYNTHROID/LEVOTHROID) 112 MCG tablet Take 112 mcg by mouth every morning (before breakfast)       losartan (COZAAR) 25 MG tablet Take 1 tablet (25 mg) by mouth daily. 90 tablet 3     metoprolol succinate ER (TOPROL XL) 50 MG 24 hr tablet Take 3 tablets (150 mg) by mouth daily AND 2 tablets (100 mg) at bedtime. (Patient taking differently: Take 3 tablets (150 mg) by mouth in the morning AND 2 tablets (100 mg) at bedtime.) 450 tablet 3     torsemide (DEMADEX) 10 MG tablet Take 1 tab (10mg) on MWF            Past Medical History     Past Medical " History:   Diagnosis Date     Ischemic cardiomyopathy 01/26/2024     NSTEMI (non-ST elevated myocardial infarction) (H) 06/01/2023     Paroxysmal atrial fibrillation (H) 05/25/2023     Past Surgical History:   Procedure Laterality Date     APPENDECTOMY       CV CORONARY ANGIOGRAM N/A 5/30/2023    Procedure: Coronary Angiogram;  Surgeon: Bryant Simental MD;  Location:  [facility]     CV PCI N/A 5/30/2023    Procedure: Percutaneous Coronary Intervention;  Surgeon: Bryant Simental MD;  Location:  [facility]     ENT SURGERY       GYN SURGERY       Family History   Problem Relation Age of Onset     Heart Disease Mother         Per pt something heart related- unsure what     Heart Disease Father         Per pt something heart related- unsure what            Allergies   Digoxin, Codeine sulfate, and Penicillins    35 minutes spent on the date of the encounter doing chart review, history and exam, documentation and further activities as noted above    Thank you for allowing me to participate in the care of your patient.      Sincerely,     Mira Davis PA-C     [facility]  cc:   Shawn Craig MD  [address]  [place] [address]

## (undated) DEVICE — MANIFOLD KIT ANGIO AUTOMATED 014613

## (undated) DEVICE — VALVE HEMOSTASIS .096" COPILOT MECH 1003331

## (undated) DEVICE — RAD G/W INQWIRE .035X260CM J-TIP EXCHANGE IQ35F260J1O5RS

## (undated) DEVICE — CATH ANGIO INFINITI JL4 4FRX100CM 538420

## (undated) DEVICE — CATH BALLOON EMERGE 2.5X12MM H7493918912250

## (undated) DEVICE — CATH DIAGNOSTIC RADIAL 5FR TIG 4.0

## (undated) DEVICE — CATH ANGIO INFINITI JR4 4FRX100CM 538421

## (undated) DEVICE — INTRO GLIDESHEATH SLENDER 6FR 10X45CM 60-1060

## (undated) DEVICE — KIT HAND CONTROL ANGIOTOUCH ACIST 65CM AT-P65

## (undated) DEVICE — RAD INFLATOR BASIC COMPAK  IN4130

## (undated) DEVICE — CATH BALLOON EMERGE 2.5X15MM H7493918915250

## (undated) DEVICE — KIT LG BORE TOUHY ACCESS PLUS MAP152

## (undated) DEVICE — CATH BALLOON NC EMERGE 3.00X12MM H7493926712300

## (undated) DEVICE — CATH GUIDING BLUE YELLOW PTFE XB3.5 6FRX100CM 67005400

## (undated) DEVICE — GUIDEWIRE VASC 0.035INX150CM INQWIRE J TIP IQ35F150J3F/A

## (undated) DEVICE — GUIDEWIRE VASC 0.014INX180CM RUNTHROUGH 25-1011

## (undated) DEVICE — SLEEVE TR BAND RADIAL COMPRESSION DEVICE 29CM XX-RF06L

## (undated) DEVICE — CATH BALLOON EMERGE 2.0X12MM H7493918912200

## (undated) DEVICE — STAT-PADZ HVP MULTI-FUNCTION ELECTRODES